# Patient Record
Sex: MALE | Race: WHITE | NOT HISPANIC OR LATINO
[De-identification: names, ages, dates, MRNs, and addresses within clinical notes are randomized per-mention and may not be internally consistent; named-entity substitution may affect disease eponyms.]

---

## 2017-01-25 ENCOUNTER — NON-APPOINTMENT (OUTPATIENT)
Age: 82
End: 2017-01-25

## 2017-01-25 ENCOUNTER — RESULT REVIEW (OUTPATIENT)
Age: 82
End: 2017-01-25

## 2017-01-25 ENCOUNTER — APPOINTMENT (OUTPATIENT)
Dept: CARDIOLOGY | Facility: CLINIC | Age: 82
End: 2017-01-25

## 2017-01-25 VITALS
WEIGHT: 226 LBS | DIASTOLIC BLOOD PRESSURE: 80 MMHG | HEIGHT: 74 IN | HEART RATE: 83 BPM | BODY MASS INDEX: 29 KG/M2 | OXYGEN SATURATION: 96 % | SYSTOLIC BLOOD PRESSURE: 120 MMHG

## 2017-01-25 DIAGNOSIS — Z86.718 PERSONAL HISTORY OF OTHER VENOUS THROMBOSIS AND EMBOLISM: ICD-10-CM

## 2017-01-25 DIAGNOSIS — Z86.010 PERSONAL HISTORY OF COLONIC POLYPS: ICD-10-CM

## 2017-01-25 LAB
INR PPP: 2 RATIO
POCT-PROTHROMBIN TIME: 24.5 SECS
QUALITY CONTROL: YES

## 2017-02-02 ENCOUNTER — APPOINTMENT (OUTPATIENT)
Dept: CARDIOLOGY | Facility: CLINIC | Age: 82
End: 2017-02-02

## 2017-02-06 ENCOUNTER — APPOINTMENT (OUTPATIENT)
Dept: CARDIOLOGY | Facility: CLINIC | Age: 82
End: 2017-02-06

## 2017-02-27 ENCOUNTER — RX RENEWAL (OUTPATIENT)
Age: 82
End: 2017-02-27

## 2017-03-06 ENCOUNTER — RX RENEWAL (OUTPATIENT)
Age: 82
End: 2017-03-06

## 2017-05-24 ENCOUNTER — NON-APPOINTMENT (OUTPATIENT)
Age: 82
End: 2017-05-24

## 2017-05-24 ENCOUNTER — APPOINTMENT (OUTPATIENT)
Dept: CARDIOLOGY | Facility: CLINIC | Age: 82
End: 2017-05-24

## 2017-05-24 VITALS
TEMPERATURE: 97.6 F | HEART RATE: 98 BPM | RESPIRATION RATE: 16 BRPM | HEIGHT: 74 IN | SYSTOLIC BLOOD PRESSURE: 128 MMHG | WEIGHT: 222 LBS | BODY MASS INDEX: 28.49 KG/M2 | OXYGEN SATURATION: 97 % | DIASTOLIC BLOOD PRESSURE: 82 MMHG

## 2017-05-24 LAB
INR PPP: 1.8 RATIO
POCT-PROTHROMBIN TIME: 21.2 SECS
QUALITY CONTROL: YES

## 2017-06-06 ENCOUNTER — RX RENEWAL (OUTPATIENT)
Age: 82
End: 2017-06-06

## 2017-06-09 ENCOUNTER — APPOINTMENT (OUTPATIENT)
Dept: CARDIOLOGY | Facility: CLINIC | Age: 82
End: 2017-06-09

## 2017-06-09 VITALS — HEART RATE: 81 BPM | DIASTOLIC BLOOD PRESSURE: 85 MMHG | OXYGEN SATURATION: 96 % | SYSTOLIC BLOOD PRESSURE: 130 MMHG

## 2017-06-09 LAB
INR PPP: 2.3 RATIO
POCT-PROTHROMBIN TIME: 27.7 SECS
QUALITY CONTROL: YES

## 2017-07-18 ENCOUNTER — APPOINTMENT (OUTPATIENT)
Dept: NEUROLOGY | Facility: CLINIC | Age: 82
End: 2017-07-18

## 2017-07-18 VITALS
HEIGHT: 74 IN | WEIGHT: 218 LBS | HEART RATE: 64 BPM | SYSTOLIC BLOOD PRESSURE: 110 MMHG | DIASTOLIC BLOOD PRESSURE: 80 MMHG | BODY MASS INDEX: 27.98 KG/M2

## 2017-07-18 DIAGNOSIS — H91.21 SUDDEN IDIOPATHIC HEARING LOSS, RIGHT EAR: ICD-10-CM

## 2017-09-27 ENCOUNTER — APPOINTMENT (OUTPATIENT)
Dept: CARDIOLOGY | Facility: CLINIC | Age: 82
End: 2017-09-27
Payer: MEDICARE

## 2017-09-27 ENCOUNTER — NON-APPOINTMENT (OUTPATIENT)
Age: 82
End: 2017-09-27

## 2017-09-27 VITALS
SYSTOLIC BLOOD PRESSURE: 100 MMHG | BODY MASS INDEX: 27.72 KG/M2 | HEIGHT: 74 IN | OXYGEN SATURATION: 96 % | DIASTOLIC BLOOD PRESSURE: 72 MMHG | WEIGHT: 216 LBS | HEART RATE: 89 BPM

## 2017-09-27 PROCEDURE — 85610 PROTHROMBIN TIME: CPT | Mod: QW

## 2017-09-27 PROCEDURE — 93000 ELECTROCARDIOGRAM COMPLETE: CPT

## 2017-09-27 PROCEDURE — 99215 OFFICE O/P EST HI 40 MIN: CPT | Mod: 25

## 2017-09-27 PROCEDURE — G0008: CPT

## 2017-09-27 PROCEDURE — 90686 IIV4 VACC NO PRSV 0.5 ML IM: CPT

## 2017-09-28 LAB
INR PPP: 2.3 RATIO
POCT-PROTHROMBIN TIME: 27 SECS
QUALITY CONTROL: YES

## 2017-09-29 ENCOUNTER — MED ADMIN CHARGE (OUTPATIENT)
Age: 82
End: 2017-09-29

## 2017-10-23 ENCOUNTER — RX RENEWAL (OUTPATIENT)
Age: 82
End: 2017-10-23

## 2017-11-24 ENCOUNTER — RX RENEWAL (OUTPATIENT)
Age: 82
End: 2017-11-24

## 2017-11-30 ENCOUNTER — RX RENEWAL (OUTPATIENT)
Age: 82
End: 2017-11-30

## 2017-12-08 ENCOUNTER — MEDICATION RENEWAL (OUTPATIENT)
Age: 82
End: 2017-12-08

## 2018-01-24 ENCOUNTER — NON-APPOINTMENT (OUTPATIENT)
Age: 83
End: 2018-01-24

## 2018-01-24 ENCOUNTER — APPOINTMENT (OUTPATIENT)
Dept: CARDIOLOGY | Facility: CLINIC | Age: 83
End: 2018-01-24
Payer: MEDICARE

## 2018-01-24 VITALS
HEIGHT: 74 IN | HEART RATE: 91 BPM | BODY MASS INDEX: 27.85 KG/M2 | OXYGEN SATURATION: 97 % | SYSTOLIC BLOOD PRESSURE: 148 MMHG | TEMPERATURE: 98 F | DIASTOLIC BLOOD PRESSURE: 88 MMHG | WEIGHT: 217 LBS

## 2018-01-24 LAB
INR PPP: 2.5 RATIO
POCT-PROTHROMBIN TIME: 30.5 SECS
QUALITY CONTROL: YES

## 2018-01-24 PROCEDURE — 93000 ELECTROCARDIOGRAM COMPLETE: CPT

## 2018-01-24 PROCEDURE — 99214 OFFICE O/P EST MOD 30 MIN: CPT | Mod: 25

## 2018-02-09 ENCOUNTER — APPOINTMENT (OUTPATIENT)
Dept: NEUROLOGY | Facility: CLINIC | Age: 83
End: 2018-02-09
Payer: MEDICARE

## 2018-02-09 VITALS
SYSTOLIC BLOOD PRESSURE: 146 MMHG | BODY MASS INDEX: 27.72 KG/M2 | WEIGHT: 216 LBS | HEART RATE: 88 BPM | DIASTOLIC BLOOD PRESSURE: 76 MMHG | HEIGHT: 74 IN

## 2018-02-09 PROCEDURE — 99214 OFFICE O/P EST MOD 30 MIN: CPT

## 2018-02-20 ENCOUNTER — APPOINTMENT (OUTPATIENT)
Dept: CARDIOLOGY | Facility: CLINIC | Age: 83
End: 2018-02-20
Payer: MEDICARE

## 2018-02-20 PROCEDURE — 93306 TTE W/DOPPLER COMPLETE: CPT

## 2018-03-14 ENCOUNTER — APPOINTMENT (OUTPATIENT)
Dept: INTERNAL MEDICINE | Facility: CLINIC | Age: 83
End: 2018-03-14

## 2018-04-26 ENCOUNTER — RX RENEWAL (OUTPATIENT)
Age: 83
End: 2018-04-26

## 2018-05-03 ENCOUNTER — APPOINTMENT (OUTPATIENT)
Dept: NEUROLOGY | Facility: CLINIC | Age: 83
End: 2018-05-03
Payer: MEDICARE

## 2018-05-03 VITALS
BODY MASS INDEX: 27.72 KG/M2 | SYSTOLIC BLOOD PRESSURE: 126 MMHG | WEIGHT: 216 LBS | DIASTOLIC BLOOD PRESSURE: 76 MMHG | HEART RATE: 78 BPM | HEIGHT: 74 IN

## 2018-05-03 PROCEDURE — 99214 OFFICE O/P EST MOD 30 MIN: CPT

## 2018-05-16 ENCOUNTER — APPOINTMENT (OUTPATIENT)
Dept: CARDIOLOGY | Facility: CLINIC | Age: 83
End: 2018-05-16
Payer: MEDICARE

## 2018-05-16 VITALS
HEART RATE: 82 BPM | SYSTOLIC BLOOD PRESSURE: 144 MMHG | HEIGHT: 74 IN | DIASTOLIC BLOOD PRESSURE: 91 MMHG | WEIGHT: 217 LBS | OXYGEN SATURATION: 98 % | BODY MASS INDEX: 27.85 KG/M2

## 2018-05-16 LAB
INR PPP: 2.8 RATIO
POCT-PROTHROMBIN TIME: 33.1 SECS
QUALITY CONTROL: YES

## 2018-05-16 PROCEDURE — 93000 ELECTROCARDIOGRAM COMPLETE: CPT

## 2018-05-16 PROCEDURE — 99214 OFFICE O/P EST MOD 30 MIN: CPT | Mod: 25

## 2018-05-16 RX ORDER — DICLOFENAC SODIUM 10 MG/G
1 GEL TOPICAL
Qty: 100 | Refills: 0 | Status: DISCONTINUED | COMMUNITY
Start: 2017-11-30 | End: 2018-05-16

## 2018-05-16 RX ORDER — CYCLOBENZAPRINE HYDROCHLORIDE 5 MG/1
5 TABLET, FILM COATED ORAL
Qty: 10 | Refills: 0 | Status: DISCONTINUED | COMMUNITY
Start: 2017-11-03 | End: 2018-05-16

## 2018-05-22 ENCOUNTER — NON-APPOINTMENT (OUTPATIENT)
Age: 83
End: 2018-05-22

## 2018-05-23 ENCOUNTER — RX RENEWAL (OUTPATIENT)
Age: 83
End: 2018-05-23

## 2018-05-23 ENCOUNTER — MEDICATION RENEWAL (OUTPATIENT)
Age: 83
End: 2018-05-23

## 2018-06-18 ENCOUNTER — RECORD ABSTRACTING (OUTPATIENT)
Age: 83
End: 2018-06-18

## 2018-06-19 ENCOUNTER — RESULT REVIEW (OUTPATIENT)
Age: 83
End: 2018-06-19

## 2018-06-19 LAB
INR PPP: 2.7
PROTHROMBIN TIME COMMENT: NORMAL

## 2018-07-13 LAB
INR PPP: 1.9
PROTHROMBIN TIME COMMENT: NORMAL

## 2018-07-20 ENCOUNTER — APPOINTMENT (OUTPATIENT)
Dept: NEUROLOGY | Facility: CLINIC | Age: 83
End: 2018-07-20
Payer: MEDICARE

## 2018-07-20 VITALS
BODY MASS INDEX: 28.23 KG/M2 | DIASTOLIC BLOOD PRESSURE: 62 MMHG | HEART RATE: 78 BPM | WEIGHT: 220 LBS | HEIGHT: 74 IN | SYSTOLIC BLOOD PRESSURE: 122 MMHG

## 2018-07-20 PROCEDURE — 99214 OFFICE O/P EST MOD 30 MIN: CPT

## 2018-07-23 ENCOUNTER — RESULT REVIEW (OUTPATIENT)
Age: 83
End: 2018-07-23

## 2018-07-23 LAB
INR PPP: 2
PROTHROMBIN TIME COMMENT: NORMAL

## 2018-08-10 ENCOUNTER — RESULT REVIEW (OUTPATIENT)
Age: 83
End: 2018-08-10

## 2018-08-10 LAB
INR PPP: 2.4
PROTHROMBIN TIME COMMENT: NORMAL

## 2018-08-24 ENCOUNTER — RX RENEWAL (OUTPATIENT)
Age: 83
End: 2018-08-24

## 2018-08-30 ENCOUNTER — RESULT REVIEW (OUTPATIENT)
Age: 83
End: 2018-08-30

## 2018-08-30 LAB — INR PPP: 3.7

## 2018-09-04 ENCOUNTER — RESULT REVIEW (OUTPATIENT)
Age: 83
End: 2018-09-04

## 2018-09-04 LAB — INR PPP: 3

## 2018-09-19 ENCOUNTER — APPOINTMENT (OUTPATIENT)
Dept: CARDIOLOGY | Facility: CLINIC | Age: 83
End: 2018-09-19
Payer: MEDICARE

## 2018-09-19 ENCOUNTER — RESULT REVIEW (OUTPATIENT)
Age: 83
End: 2018-09-19

## 2018-09-20 LAB
INR PPP: 2.5
PROTHROMBIN TIME COMMENT: NORMAL

## 2018-09-27 ENCOUNTER — NON-APPOINTMENT (OUTPATIENT)
Age: 83
End: 2018-09-27

## 2018-09-27 ENCOUNTER — APPOINTMENT (OUTPATIENT)
Dept: CARDIOLOGY | Facility: CLINIC | Age: 83
End: 2018-09-27
Payer: MEDICARE

## 2018-09-27 VITALS
DIASTOLIC BLOOD PRESSURE: 78 MMHG | OXYGEN SATURATION: 97 % | WEIGHT: 226 LBS | HEART RATE: 82 BPM | BODY MASS INDEX: 29 KG/M2 | HEIGHT: 74 IN | SYSTOLIC BLOOD PRESSURE: 118 MMHG

## 2018-09-27 PROCEDURE — 90686 IIV4 VACC NO PRSV 0.5 ML IM: CPT

## 2018-09-27 PROCEDURE — 93000 ELECTROCARDIOGRAM COMPLETE: CPT

## 2018-09-27 PROCEDURE — 99214 OFFICE O/P EST MOD 30 MIN: CPT | Mod: 25

## 2018-09-27 PROCEDURE — G0008: CPT

## 2018-10-08 ENCOUNTER — RESULT REVIEW (OUTPATIENT)
Age: 83
End: 2018-10-08

## 2018-10-08 LAB — INR PPP: 3.1

## 2018-10-11 ENCOUNTER — RX RENEWAL (OUTPATIENT)
Age: 83
End: 2018-10-11

## 2018-10-17 ENCOUNTER — RESULT REVIEW (OUTPATIENT)
Age: 83
End: 2018-10-17

## 2018-10-17 LAB
INR PPP: 2.7
PROTHROMBIN TIME COMMENT: NORMAL

## 2018-10-25 ENCOUNTER — APPOINTMENT (OUTPATIENT)
Dept: NEUROLOGY | Facility: CLINIC | Age: 83
End: 2018-10-25
Payer: MEDICARE

## 2018-10-25 PROCEDURE — 95816 EEG AWAKE AND DROWSY: CPT

## 2018-11-05 ENCOUNTER — RESULT REVIEW (OUTPATIENT)
Age: 83
End: 2018-11-05

## 2018-11-05 LAB
INR PPP: 3.5
PROTHROMBIN TIME COMMENT: NORMAL

## 2018-11-19 ENCOUNTER — APPOINTMENT (OUTPATIENT)
Dept: CARDIOLOGY | Facility: CLINIC | Age: 83
End: 2018-11-19
Payer: MEDICARE

## 2018-11-19 PROCEDURE — 36415 COLL VENOUS BLD VENIPUNCTURE: CPT

## 2018-11-21 LAB
INR PPP: 2.3 RATIO
POCT-PROTHROMBIN TIME: 27.6 SECS
QUALITY CONTROL: YES

## 2018-12-12 ENCOUNTER — RESULT REVIEW (OUTPATIENT)
Age: 83
End: 2018-12-12

## 2018-12-12 ENCOUNTER — APPOINTMENT (OUTPATIENT)
Dept: CARDIOLOGY | Facility: CLINIC | Age: 83
End: 2018-12-12
Payer: MEDICARE

## 2018-12-12 LAB
INR PPP: 3 RATIO
POCT-PROTHROMBIN TIME: 35.5 SECS
QUALITY CONTROL: YES

## 2018-12-12 PROCEDURE — 85610 PROTHROMBIN TIME: CPT | Mod: QW

## 2018-12-12 PROCEDURE — G0250: CPT

## 2018-12-27 ENCOUNTER — TRANSCRIPTION ENCOUNTER (OUTPATIENT)
Age: 83
End: 2018-12-27

## 2018-12-28 ENCOUNTER — RESULT REVIEW (OUTPATIENT)
Age: 83
End: 2018-12-28

## 2018-12-28 LAB
INR PPP: 2.3
PROTHROMBIN TIME COMMENT: NORMAL
PT BLD: 22.9

## 2019-01-18 ENCOUNTER — RX RENEWAL (OUTPATIENT)
Age: 84
End: 2019-01-18

## 2019-01-18 ENCOUNTER — APPOINTMENT (OUTPATIENT)
Dept: CARDIOLOGY | Facility: CLINIC | Age: 84
End: 2019-01-18
Payer: MEDICARE

## 2019-01-18 ENCOUNTER — RESULT REVIEW (OUTPATIENT)
Age: 84
End: 2019-01-18

## 2019-01-18 ENCOUNTER — APPOINTMENT (OUTPATIENT)
Dept: NEUROLOGY | Facility: CLINIC | Age: 84
End: 2019-01-18
Payer: MEDICARE

## 2019-01-18 ENCOUNTER — NON-APPOINTMENT (OUTPATIENT)
Age: 84
End: 2019-01-18

## 2019-01-18 VITALS
HEIGHT: 74 IN | HEART RATE: 98 BPM | SYSTOLIC BLOOD PRESSURE: 120 MMHG | OXYGEN SATURATION: 98 % | BODY MASS INDEX: 29.52 KG/M2 | DIASTOLIC BLOOD PRESSURE: 79 MMHG | WEIGHT: 230 LBS

## 2019-01-18 VITALS
BODY MASS INDEX: 16.68 KG/M2 | WEIGHT: 130 LBS | SYSTOLIC BLOOD PRESSURE: 126 MMHG | HEIGHT: 74 IN | HEART RATE: 86 BPM | DIASTOLIC BLOOD PRESSURE: 64 MMHG

## 2019-01-18 DIAGNOSIS — R40.4 TRANSIENT ALTERATION OF AWARENESS: ICD-10-CM

## 2019-01-18 LAB
INR PPP: 2.5
PROTHROMBIN TIME COMMENT: NORMAL

## 2019-01-18 PROCEDURE — 99214 OFFICE O/P EST MOD 30 MIN: CPT

## 2019-01-18 PROCEDURE — 93000 ELECTROCARDIOGRAM COMPLETE: CPT

## 2019-01-18 PROCEDURE — 99214 OFFICE O/P EST MOD 30 MIN: CPT | Mod: 25

## 2019-01-18 RX ORDER — DOXYCYCLINE HYCLATE 100 MG/1
100 CAPSULE ORAL
Qty: 20 | Refills: 0 | Status: COMPLETED | COMMUNITY
Start: 2019-01-02

## 2019-01-18 RX ORDER — BENZONATATE 100 MG/1
100 CAPSULE ORAL
Qty: 20 | Refills: 0 | Status: COMPLETED | COMMUNITY
Start: 2018-12-26

## 2019-01-18 RX ORDER — FLUTICASONE PROPIONATE 50 UG/1
50 SPRAY, METERED NASAL
Qty: 16 | Refills: 0 | Status: COMPLETED | COMMUNITY
Start: 2018-12-26

## 2019-01-18 RX ORDER — ALBUTEROL SULFATE 90 UG/1
108 (90 BASE) AEROSOL, METERED RESPIRATORY (INHALATION)
Qty: 8 | Refills: 0 | Status: COMPLETED | COMMUNITY
Start: 2018-12-26

## 2019-01-18 RX ORDER — MECLIZINE HYDROCHLORIDE 25 MG/1
25 TABLET ORAL
Qty: 30 | Refills: 0 | Status: COMPLETED | COMMUNITY
Start: 2018-10-19

## 2019-01-18 NOTE — HISTORY OF PRESENT ILLNESS
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 When he received physical therapy his gait improved significantly.\par \par No significant new changes since last visit still taking Coumadin for his DVT. No headaches no dizziness no focal weakness. History of chronic a fibrillation and DVT. No focal complaints at present except for persistent gait difficulty. No low back pain.

## 2019-01-18 NOTE — PHYSICAL EXAM
[General Appearance - Alert] : alert [General Appearance - In No Acute Distress] : in no acute distress [Oriented To Time, Place, And Person] : oriented to person, place, and time [Impaired Insight] : insight and judgment were intact [Affect] : the affect was normal [Person] : oriented to person [Place] : oriented to place [Time] : oriented to time [Concentration Intact] : normal concentrating ability [Visual Intact] : visual attention was ~T not ~L decreased [Naming Objects] : no difficulty naming common objects [Repeating Phrases] : no difficulty repeating a phrase [Writing A Sentence] : no difficulty writing a sentence [Fluency] : fluency intact [Comprehension] : comprehension intact [Reading] : reading intact [Past History] : adequate knowledge of personal past history [Cranial Nerves Optic (II)] : visual acuity intact bilaterally,  visual fields full to confrontation, pupils equal round and reactive to light [Cranial Nerves Oculomotor (III)] : extraocular motion intact [Cranial Nerves Trigeminal (V)] : facial sensation intact symmetrically [Cranial Nerves Facial (VII)] : face symmetrical [Cranial Nerves Vestibulocochlear (VIII)] : hearing was intact bilaterally [Cranial Nerves Glossopharyngeal (IX)] : tongue and palate midline [Cranial Nerves Accessory (XI - Cranial And Spinal)] : head turning and shoulder shrug symmetric [Cranial Nerves Hypoglossal (XII)] : there was no tongue deviation with protrusion [Motor Strength] : muscle strength was normal in all four extremities [No Muscle Atrophy] : normal bulk in all four extremities [Sensation Tactile Decrease] : light touch was intact [Romberg's Sign] : a positive Romberg's sign was present [Limited Balance] : the patient's balance was impaired [1+] : Patella left 1+ [0] : Ankle jerk left 0 [Sclera] : the sclera and conjunctiva were normal [PERRL With Normal Accommodation] : pupils were equal in size, round, reactive to light, with normal accommodation [Extraocular Movements] : extraocular movements were intact [Outer Ear] : the ears and nose were normal in appearance [Oropharynx] : the oropharynx was normal [Neck Appearance] : the appearance of the neck was normal [Neck Cervical Mass (___cm)] : no neck mass was observed [Jugular Venous Distention Increased] : there was no jugular-venous distention [Thyroid Diffuse Enlargement] : the thyroid was not enlarged [Thyroid Nodule] : there were no palpable thyroid nodules [Auscultation Breath Sounds / Voice Sounds] : lungs were clear to auscultation bilaterally [Heart Sounds] : normal S1 and S2 [Heart Sounds Gallop] : no gallops [Murmurs] : no murmurs [Heart Sounds Pericardial Friction Rub] : no pericardial rub [Full Pulse] : the pedal pulses are present [Bowel Sounds] : normal bowel sounds [Abdomen Soft] : soft [Abdomen Tenderness] : non-tender [Abdomen Mass (___ Cm)] : no abdominal mass palpated [No CVA Tenderness] : no ~M costovertebral angle tenderness [No Spinal Tenderness] : no spinal tenderness [Nail Clubbing] : no clubbing  or cyanosis of the fingernails [Musculoskeletal - Swelling] : no joint swelling seen [Motor Tone] : muscle strength and tone were normal [Skin Color & Pigmentation] : normal skin color and pigmentation [Skin Turgor] : normal skin turgor [] : no rash [Past-pointing] : there was no past-pointing [Tremor] : no tremor present [Plantar Reflex Right Only] : normal on the right [Plantar Reflex Left Only] : normal on the left [___] : absent on the right [___] : absent on the left [FreeTextEntry7] : Distal vibration loss [FreeTextEntry8] : Unsteady improved [FreeTextEntry1] : Unsteady and unable to tandem walk

## 2019-01-18 NOTE — DISCUSSION/SUMMARY
[FreeTextEntry1] : Shin with unsteady gait with underlying neuropathy by history and  NPH unchanged an MRI scan.\par \par EEG did not reveal any seizure like events.\par \par Continue followup with you for other medical problems and cardiology.\par \par Does not want a neurosurgical consult at this time.\par \par Recommend patient to have a repeat EMG/NCV studies. Prior study done with a  Dr. Espinosa and no reports available.\par \par Continue exercises as tolerated does not want to start physical therapy at this time.\par \par Return for reevaluation after the EMG is done.\par \par  Discussed with the patient and his wife.

## 2019-01-18 NOTE — REASON FOR VISIT
[Follow-Up - Clinic] : a clinic follow-up of [Anticoagulation] : anticoagulation [Atrial Fibrillation] : atrial fibrillation [Hyperlipidemia] : hyperlipidemia [Hypertension] : hypertension [FreeTextEntry1] : CM

## 2019-01-18 NOTE — REVIEW OF SYSTEMS
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

## 2019-01-18 NOTE — DISCUSSION/SUMMARY
[INR Therapeutic] : the patient's INR is therapeutic [Atrial Fibrillation] : atrial fibrillation [Compensated] : compensated [Cardiomyopathy] : cardiomyopathy [Hypertension] : hypertension [Responding to Treatment] : responding to treatment [None] : none [Patient] : the patient [FreeTextEntry1] : H/O AF/CM/HTN/Hyperlipidemia/DVT/treated ADRY who presents here today in routine follow up\par \par From cardiac standpoint patient is doing well\par \par OV 3 months

## 2019-01-18 NOTE — HISTORY OF PRESENT ILLNESS
[FreeTextEntry1] : H/O AF/CM/HTN/Hyperlipidemia/DVT/treated ADRY who presents here today in routine follow up\par \par recently completed course of ABX 2/2 bronchitis \par \par Is under care of Neurology/ENT/Vascular  gait instability/neuropathy  \par \par Echo/Carotid US 2018 \par \par Presently feeling well

## 2019-02-12 ENCOUNTER — APPOINTMENT (OUTPATIENT)
Dept: NEUROLOGY | Facility: CLINIC | Age: 84
End: 2019-02-12

## 2019-02-14 ENCOUNTER — RESULT REVIEW (OUTPATIENT)
Age: 84
End: 2019-02-14

## 2019-02-14 LAB — INR PPP: 2.1

## 2019-04-01 LAB
INR PPP: 2.3
PROTHROMBIN TIME COMMENT: NORMAL
PT BLD: 22.6

## 2019-04-23 LAB
INR PPP: 2.3
PT BLD: NORMAL

## 2019-05-13 ENCOUNTER — RX RENEWAL (OUTPATIENT)
Age: 84
End: 2019-05-13

## 2019-05-16 ENCOUNTER — APPOINTMENT (OUTPATIENT)
Dept: CARDIOLOGY | Facility: CLINIC | Age: 84
End: 2019-05-16
Payer: MEDICARE

## 2019-05-16 ENCOUNTER — NON-APPOINTMENT (OUTPATIENT)
Age: 84
End: 2019-05-16

## 2019-05-16 VITALS — SYSTOLIC BLOOD PRESSURE: 110 MMHG | HEART RATE: 95 BPM | OXYGEN SATURATION: 96 % | DIASTOLIC BLOOD PRESSURE: 80 MMHG

## 2019-05-16 LAB
INR PPP: 2.5 RATIO
POCT-PROTHROMBIN TIME: 29.9 SECS
QUALITY CONTROL: YES

## 2019-05-16 PROCEDURE — 85610 PROTHROMBIN TIME: CPT | Mod: QW

## 2019-05-16 PROCEDURE — 93000 ELECTROCARDIOGRAM COMPLETE: CPT

## 2019-05-16 PROCEDURE — 99214 OFFICE O/P EST MOD 30 MIN: CPT | Mod: 25

## 2019-05-16 NOTE — DISCUSSION/SUMMARY
[INR Therapeutic] : the patient's INR is therapeutic [Atrial Fibrillation] : atrial fibrillation [Compensated] : compensated [Cardiomyopathy] : cardiomyopathy [Hypertension] : hypertension [Responding to Treatment] : responding to treatment [None] : none [Patient] : the patient [FreeTextEntry1] : CAD: CC 2016 reviewed No active cardiac complaints.  Routine stress test ordered \par \par AF: EKG Moderate ventricular response HR 99.  INR 2.5 \par \par CM: Yearly Echocardiogram ( Last 2018 EF 47% )  Echo ordered today \par \par AS: Moderate by Echo \par \par HTN: Controlled \par \par Hyperlipidemia: Continue statin therapy.  Labs ordered today with LDL goal of 70 \par \par DVT:  followed with Vascular Dr Nunez \par \par treated ADRY\par \par From cardiac standpoint patient is doing well\par \par OV 3 months

## 2019-05-16 NOTE — HISTORY OF PRESENT ILLNESS
[FreeTextEntry1] : 83 Y/O Gentleman PMH: AF on oral AC/CM/HTN/Hyperlipidemia/DVT/treated ADRY who presents here today in routine cardiac follow up.  Is followed with PCP\par \par Claims to be feeling well no active cardiac complaints\par \par Echo/Carotid US 2018 \par \par

## 2019-05-17 ENCOUNTER — APPOINTMENT (OUTPATIENT)
Dept: NEUROLOGY | Facility: CLINIC | Age: 84
End: 2019-05-17
Payer: MEDICARE

## 2019-05-17 VITALS
SYSTOLIC BLOOD PRESSURE: 126 MMHG | WEIGHT: 230 LBS | DIASTOLIC BLOOD PRESSURE: 62 MMHG | BODY MASS INDEX: 29.52 KG/M2 | HEART RATE: 88 BPM | HEIGHT: 74 IN

## 2019-05-17 PROCEDURE — 99214 OFFICE O/P EST MOD 30 MIN: CPT

## 2019-05-17 NOTE — CONSULT LETTER
[Dear  ___] : Dear  [unfilled], [Consult Closing:] : Thank you very much for allowing me to participate in the care of this patient.  If you have any questions, please do not hesitate to contact me. [Please see my note below.] : Please see my note below. [Consult Letter:] : I had the pleasure of evaluating your patient, [unfilled]. [Sincerely,] : Sincerely,

## 2019-05-17 NOTE — REASON FOR VISIT
[Spouse] : spouse [Follow-Up: _____] : a [unfilled] follow-up visit [FreeTextEntry1] : Follow up fro pt with neuropathy and unsteady gait with NPH

## 2019-05-17 NOTE — DISCUSSION/SUMMARY
[FreeTextEntry1] : Patient with a history of unsteady gait with neuropathy history of NPH an MRI scan.\par \par Symptoms improved as per patient's wife with [employer] remote with ENT.\par \par Continue followup evaluation as needed.\par \par Continue exercises as tolerated.\par \par Patient education provided discuss with the patient and his wife at length.\par \par Return for reevaluation in 6 months or as needed.

## 2019-05-17 NOTE — PHYSICAL EXAM
[General Appearance - In No Acute Distress] : in no acute distress [General Appearance - Alert] : alert [Oriented To Time, Place, And Person] : oriented to person, place, and time [Impaired Insight] : insight and judgment were intact [Affect] : the affect was normal [Person] : oriented to person [Place] : oriented to place [Time] : oriented to time [Concentration Intact] : normal concentrating ability [Visual Intact] : visual attention was ~T not ~L decreased [Naming Objects] : no difficulty naming common objects [Writing A Sentence] : no difficulty writing a sentence [Repeating Phrases] : no difficulty repeating a phrase [Fluency] : fluency intact [Comprehension] : comprehension intact [Reading] : reading intact [Cranial Nerves Optic (II)] : visual acuity intact bilaterally,  visual fields full to confrontation, pupils equal round and reactive to light [Cranial Nerves Oculomotor (III)] : extraocular motion intact [Past History] : adequate knowledge of personal past history [Cranial Nerves Trigeminal (V)] : facial sensation intact symmetrically [Cranial Nerves Facial (VII)] : face symmetrical [Cranial Nerves Vestibulocochlear (VIII)] : hearing was intact bilaterally [Cranial Nerves Accessory (XI - Cranial And Spinal)] : head turning and shoulder shrug symmetric [Cranial Nerves Glossopharyngeal (IX)] : tongue and palate midline [Cranial Nerves Hypoglossal (XII)] : there was no tongue deviation with protrusion [Motor Strength] : muscle strength was normal in all four extremities [No Muscle Atrophy] : normal bulk in all four extremities [Sensation Tactile Decrease] : light touch was intact [Limited Balance] : the patient's balance was impaired [Past-pointing] : there was no past-pointing [Romberg's Sign] : a positive Romberg's sign was present [Tremor] : no tremor present [1+] : Patella left 1+ [Plantar Reflex Right Only] : normal on the right [0] : Ankle jerk right 0 [___] : absent on the right [___] : absent on the left [Plantar Reflex Left Only] : normal on the left [FreeTextEntry7] : Distal vibration loss [FreeTextEntry8] : Unsteady improved [Sclera] : the sclera and conjunctiva were normal [Outer Ear] : the ears and nose were normal in appearance [PERRL With Normal Accommodation] : pupils were equal in size, round, reactive to light, with normal accommodation [Extraocular Movements] : extraocular movements were intact [Neck Cervical Mass (___cm)] : no neck mass was observed [Oropharynx] : the oropharynx was normal [Neck Appearance] : the appearance of the neck was normal [Thyroid Nodule] : there were no palpable thyroid nodules [Jugular Venous Distention Increased] : there was no jugular-venous distention [Thyroid Diffuse Enlargement] : the thyroid was not enlarged [Heart Sounds] : normal S1 and S2 [Auscultation Breath Sounds / Voice Sounds] : lungs were clear to auscultation bilaterally [Heart Sounds Pericardial Friction Rub] : no pericardial rub [Heart Sounds Gallop] : no gallops [Murmurs] : no murmurs [Full Pulse] : the pedal pulses are present [Bowel Sounds] : normal bowel sounds [Abdomen Tenderness] : non-tender [Abdomen Soft] : soft [Abdomen Mass (___ Cm)] : no abdominal mass palpated [No CVA Tenderness] : no ~M costovertebral angle tenderness [No Spinal Tenderness] : no spinal tenderness [Musculoskeletal - Swelling] : no joint swelling seen [Nail Clubbing] : no clubbing  or cyanosis of the fingernails [Motor Tone] : muscle strength and tone were normal [Skin Color & Pigmentation] : normal skin color and pigmentation [Skin Turgor] : normal skin turgor [FreeTextEntry1] : Unsteady and unable to tandem walk [] : no rash

## 2019-05-17 NOTE — HISTORY OF PRESENT ILLNESS
[FreeTextEntry1] : He is 84-year-old patient coming here for a followup evaluation with unsteady gait with underlying neuropathy and episodes of dizziness and incidental finding of NPH.\par \par Denies of any new complaints marked improvement in his symptoms since wax removed from his ears and denies of any new complaints. He did not come for EMG studies because symptoms improved significantly since last visit offers no new complaints. Going for physical therapy and exercises and completed currently. No significant new complaints.\par \par

## 2019-05-17 NOTE — REVIEW OF SYSTEMS
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

## 2019-06-03 ENCOUNTER — RX RENEWAL (OUTPATIENT)
Age: 84
End: 2019-06-03

## 2019-06-03 ENCOUNTER — MEDICATION RENEWAL (OUTPATIENT)
Age: 84
End: 2019-06-03

## 2019-06-05 ENCOUNTER — RX RENEWAL (OUTPATIENT)
Age: 84
End: 2019-06-05

## 2019-06-17 ENCOUNTER — RX RENEWAL (OUTPATIENT)
Age: 84
End: 2019-06-17

## 2019-06-17 ENCOUNTER — RESULT REVIEW (OUTPATIENT)
Age: 84
End: 2019-06-17

## 2019-06-21 LAB
INR PPP: 2.2
PROTHROMBIN TIME COMMENT: NORMAL
PT BLD: 22.2

## 2019-07-05 ENCOUNTER — RX RENEWAL (OUTPATIENT)
Age: 84
End: 2019-07-05

## 2019-07-24 NOTE — PHYSICAL EXAM
PAGED DR STARR [General Appearance - Well Developed] : well developed [Normal Appearance] : normal appearance [Well Groomed] : well groomed [No Deformities] : no deformities [General Appearance - Well Nourished] : well nourished [General Appearance - In No Acute Distress] : no acute distress [Normal Conjunctiva] : the conjunctiva exhibited no abnormalities [] : no respiratory distress [Exaggerated Use Of Accessory Muscles For Inspiration] : no accessory muscle use [Respiration, Rhythm And Depth] : normal respiratory rhythm and effort [Auscultation Breath Sounds / Voice Sounds] : lungs were clear to auscultation bilaterally [Heart Rate And Rhythm] : heart rate and rhythm were normal [Heart Sounds] : normal S1 and S2 [Abdomen Soft] : soft [Abnormal Walk] : normal gait [Oriented To Time, Place, And Person] : oriented to person, place, and time [Skin Color & Pigmentation] : normal skin color and pigmentation [FreeTextEntry1] : No LE Edema

## 2019-07-30 LAB
INR PPP: 2.8
PROTHROMBIN TIME COMMENT: NORMAL
PT BLD: 28

## 2019-08-07 ENCOUNTER — MEDICATION RENEWAL (OUTPATIENT)
Age: 84
End: 2019-08-07

## 2019-08-07 ENCOUNTER — RX RENEWAL (OUTPATIENT)
Age: 84
End: 2019-08-07

## 2019-08-20 ENCOUNTER — RESULT REVIEW (OUTPATIENT)
Age: 84
End: 2019-08-20

## 2019-08-21 ENCOUNTER — RESULT REVIEW (OUTPATIENT)
Age: 84
End: 2019-08-21

## 2019-08-21 LAB
INR PPP: 2.4
PROTHROMBIN TIME COMMENT: NORMAL
PT BLD: 23.3

## 2019-09-25 ENCOUNTER — RESULT REVIEW (OUTPATIENT)
Age: 84
End: 2019-09-25

## 2019-09-25 LAB
INR PPP: 2.5
PROTHROMBIN TIME COMMENT: NORMAL
PT BLD: 24.3

## 2019-09-26 ENCOUNTER — NON-APPOINTMENT (OUTPATIENT)
Age: 84
End: 2019-09-26

## 2019-09-26 ENCOUNTER — APPOINTMENT (OUTPATIENT)
Dept: CARDIOLOGY | Facility: CLINIC | Age: 84
End: 2019-09-26
Payer: MEDICARE

## 2019-09-26 VITALS
DIASTOLIC BLOOD PRESSURE: 88 MMHG | OXYGEN SATURATION: 97 % | WEIGHT: 224 LBS | HEART RATE: 86 BPM | HEIGHT: 74 IN | BODY MASS INDEX: 28.75 KG/M2 | SYSTOLIC BLOOD PRESSURE: 128 MMHG

## 2019-09-26 PROCEDURE — 93000 ELECTROCARDIOGRAM COMPLETE: CPT

## 2019-09-26 PROCEDURE — 99214 OFFICE O/P EST MOD 30 MIN: CPT | Mod: 25

## 2019-09-27 NOTE — PHYSICAL EXAM
[Normal Appearance] : normal appearance [General Appearance - Well Developed] : well developed [General Appearance - Well Nourished] : well nourished [Well Groomed] : well groomed [No Deformities] : no deformities [Normal Conjunctiva] : the conjunctiva exhibited no abnormalities [General Appearance - In No Acute Distress] : no acute distress [] : no respiratory distress [Exaggerated Use Of Accessory Muscles For Inspiration] : no accessory muscle use [Respiration, Rhythm And Depth] : normal respiratory rhythm and effort [Heart Rate And Rhythm] : heart rate and rhythm were normal [Auscultation Breath Sounds / Voice Sounds] : lungs were clear to auscultation bilaterally [Heart Sounds] : normal S1 and S2 [Abnormal Walk] : normal gait [Abdomen Soft] : soft [Skin Color & Pigmentation] : normal skin color and pigmentation [FreeTextEntry1] : No LE Edema  [Oriented To Time, Place, And Person] : oriented to person, place, and time

## 2019-09-27 NOTE — REASON FOR VISIT
[Follow-Up - Clinic] : a clinic follow-up of [Atrial Fibrillation] : atrial fibrillation [Anticoagulation] : anticoagulation [Hypertension] : hypertension [Hyperlipidemia] : hyperlipidemia [FreeTextEntry1] : CM

## 2019-09-27 NOTE — DISCUSSION/SUMMARY
[INR Therapeutic] : the patient's INR is therapeutic [Atrial Fibrillation] : atrial fibrillation [Compensated] : compensated [Cardiomyopathy] : cardiomyopathy [Hypertension] : hypertension [Responding to Treatment] : responding to treatment [None] : none [Patient] : the patient [FreeTextEntry1] : CAD: CC 2016 reviewed No active cardiac complaints.  Routine stress test ordered \par \par AF: EKG Moderate ventricular response HR 99.  INR 2.5 \par \par CM: Echo reviewed\par \par AS: Moderate by Echo . Repeat echo ordered\par \par HTN: Controlled \par \par Hyperlipidemia: Continue statin therapy.  Labs ordered today with LDL goal of 70 \par \par DVT:  followed with Vascular Dr Nunez \par \par treated ADRY\par \par Pt is compensated\par \par OV 3 months

## 2019-09-27 NOTE — HISTORY OF PRESENT ILLNESS
[FreeTextEntry1] : 86 Y/O Gentleman PMH: AF on oral AC/CM/HTN/Hyperlipidemia/DVT/treated ADRY who presents here today in routine cardiac follow up.  Is followed with PCP\par \par Claims to be feeling well no active cardiac complaints. He has just returned from a cruise,. He experienced no exertional symptoms\par \par Echo/Carotid US 2018 \par \par

## 2019-10-17 ENCOUNTER — APPOINTMENT (OUTPATIENT)
Dept: CARDIOLOGY | Facility: CLINIC | Age: 84
End: 2019-10-17
Payer: MEDICARE

## 2019-10-17 PROCEDURE — 93306 TTE W/DOPPLER COMPLETE: CPT

## 2019-10-24 ENCOUNTER — RESULT REVIEW (OUTPATIENT)
Age: 84
End: 2019-10-24

## 2019-10-25 LAB
INR PPP: 2.5
PROTHROMBIN TIME COMMENT: NORMAL
PT BLD: 23.9

## 2019-11-27 ENCOUNTER — RESULT REVIEW (OUTPATIENT)
Age: 84
End: 2019-11-27

## 2019-12-02 ENCOUNTER — RESULT REVIEW (OUTPATIENT)
Age: 84
End: 2019-12-02

## 2019-12-02 LAB
INR PPP: 2
PROTHROMBIN TIME COMMENT: NORMAL
PT BLD: 19.9

## 2020-01-06 ENCOUNTER — APPOINTMENT (OUTPATIENT)
Dept: NEUROLOGY | Facility: CLINIC | Age: 85
End: 2020-01-06
Payer: MEDICARE

## 2020-01-06 VITALS
HEIGHT: 73 IN | HEART RATE: 78 BPM | BODY MASS INDEX: 29.29 KG/M2 | WEIGHT: 221 LBS | RESPIRATION RATE: 16 BRPM | SYSTOLIC BLOOD PRESSURE: 120 MMHG | DIASTOLIC BLOOD PRESSURE: 68 MMHG

## 2020-01-06 DIAGNOSIS — R41.0 DISORIENTATION, UNSPECIFIED: ICD-10-CM

## 2020-01-06 PROCEDURE — 99214 OFFICE O/P EST MOD 30 MIN: CPT

## 2020-01-06 NOTE — REASON FOR VISIT
[Follow-Up: _____] : a [unfilled] follow-up visit [Spouse] : spouse [FreeTextEntry1] : Follow up fro pt with gait instability and neuropathy

## 2020-01-06 NOTE — HISTORY OF PRESENT ILLNESS
[FreeTextEntry1] : He is 85-year-old patient coming here for followup evaluation for gait imbalance with underlying neuropathy and incidental NPH without any acute pathology coming here for followup evaluation stable neurologically.\par \par No new complaints per his wife coming here without any new complaints. Persistent gait imbalance without any new complaints. No changes in his medications. Unsteadiness unchanged since last visit.

## 2020-01-06 NOTE — REVIEW OF SYSTEMS
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

## 2020-01-06 NOTE — DISCUSSION/SUMMARY
[FreeTextEntry1] : Patient with unsteady gait with peripheral neuropathy and incidental NPH.\par \par Unchanged symptoms as per his wife.\par \par Continue present medications.\par \par Followup with ENT and you and cardiology.\par \par Continue exercises as tolerated\par \par No other workup is needed at this time.\par \par Return for reevaluation in 6 months to one year.\par \par Patient education provided and discussed with patient and his wife.\par \par

## 2020-01-06 NOTE — PHYSICAL EXAM
[General Appearance - In No Acute Distress] : in no acute distress [General Appearance - Alert] : alert [Impaired Insight] : insight and judgment were intact [Affect] : the affect was normal [Oriented To Time, Place, And Person] : oriented to person, place, and time [Person] : oriented to person [Time] : oriented to time [Place] : oriented to place [Concentration Intact] : normal concentrating ability [Repeating Phrases] : no difficulty repeating a phrase [Visual Intact] : visual attention was ~T not ~L decreased [Naming Objects] : no difficulty naming common objects [Writing A Sentence] : no difficulty writing a sentence [Fluency] : fluency intact [Comprehension] : comprehension intact [Past History] : adequate knowledge of personal past history [Reading] : reading intact [Cranial Nerves Facial (VII)] : face symmetrical [Cranial Nerves Oculomotor (III)] : extraocular motion intact [Cranial Nerves Trigeminal (V)] : facial sensation intact symmetrically [Cranial Nerves Optic (II)] : visual acuity intact bilaterally,  visual fields full to confrontation, pupils equal round and reactive to light [Cranial Nerves Vestibulocochlear (VIII)] : hearing was intact bilaterally [Cranial Nerves Glossopharyngeal (IX)] : tongue and palate midline [Cranial Nerves Accessory (XI - Cranial And Spinal)] : head turning and shoulder shrug symmetric [Motor Strength] : muscle strength was normal in all four extremities [Cranial Nerves Hypoglossal (XII)] : there was no tongue deviation with protrusion [Sensation Tactile Decrease] : light touch was intact [Romberg's Sign] : a positive Romberg's sign was present [No Muscle Atrophy] : normal bulk in all four extremities [Tremor] : no tremor present [Limited Balance] : the patient's balance was impaired [Past-pointing] : there was no past-pointing [1+] : Brachioradialis left 1+ [Plantar Reflex Right Only] : normal on the right [0] : Ankle jerk right 0 [Plantar Reflex Left Only] : normal on the left [___] : absent on the right [FreeTextEntry8] : Unsteady improved [___] : absent on the left [FreeTextEntry7] : Distal vibration loss [PERRL With Normal Accommodation] : pupils were equal in size, round, reactive to light, with normal accommodation [Extraocular Movements] : extraocular movements were intact [Sclera] : the sclera and conjunctiva were normal [Outer Ear] : the ears and nose were normal in appearance [Oropharynx] : the oropharynx was normal [Neck Appearance] : the appearance of the neck was normal [Jugular Venous Distention Increased] : there was no jugular-venous distention [Neck Cervical Mass (___cm)] : no neck mass was observed [Thyroid Diffuse Enlargement] : the thyroid was not enlarged [Thyroid Nodule] : there were no palpable thyroid nodules [Heart Sounds] : normal S1 and S2 [Auscultation Breath Sounds / Voice Sounds] : lungs were clear to auscultation bilaterally [Murmurs] : no murmurs [Heart Sounds Pericardial Friction Rub] : no pericardial rub [Heart Sounds Gallop] : no gallops [Full Pulse] : the pedal pulses are present [Bowel Sounds] : normal bowel sounds [Abdomen Soft] : soft [Abdomen Mass (___ Cm)] : no abdominal mass palpated [Abdomen Tenderness] : non-tender [No CVA Tenderness] : no ~M costovertebral angle tenderness [No Spinal Tenderness] : no spinal tenderness [Nail Clubbing] : no clubbing  or cyanosis of the fingernails [Musculoskeletal - Swelling] : no joint swelling seen [Motor Tone] : muscle strength and tone were normal [FreeTextEntry1] : Unsteady and unable to tandem walk [Skin Color & Pigmentation] : normal skin color and pigmentation [Skin Turgor] : normal skin turgor [] : no rash

## 2020-01-09 ENCOUNTER — NON-APPOINTMENT (OUTPATIENT)
Age: 85
End: 2020-01-09

## 2020-01-09 ENCOUNTER — APPOINTMENT (OUTPATIENT)
Dept: CARDIOLOGY | Facility: CLINIC | Age: 85
End: 2020-01-09
Payer: MEDICARE

## 2020-01-09 VITALS
OXYGEN SATURATION: 97 % | BODY MASS INDEX: 29.29 KG/M2 | HEIGHT: 73 IN | DIASTOLIC BLOOD PRESSURE: 91 MMHG | HEART RATE: 72 BPM | SYSTOLIC BLOOD PRESSURE: 147 MMHG | WEIGHT: 221 LBS

## 2020-01-09 PROCEDURE — 99214 OFFICE O/P EST MOD 30 MIN: CPT

## 2020-01-09 PROCEDURE — 93000 ELECTROCARDIOGRAM COMPLETE: CPT

## 2020-01-09 NOTE — PHYSICAL EXAM
[Normal Appearance] : normal appearance [General Appearance - Well Developed] : well developed [Well Groomed] : well groomed [No Deformities] : no deformities [General Appearance - Well Nourished] : well nourished [Normal Conjunctiva] : the conjunctiva exhibited no abnormalities [General Appearance - In No Acute Distress] : no acute distress [Respiration, Rhythm And Depth] : normal respiratory rhythm and effort [Exaggerated Use Of Accessory Muscles For Inspiration] : no accessory muscle use [] : no respiratory distress [Heart Sounds] : normal S1 and S2 [Auscultation Breath Sounds / Voice Sounds] : lungs were clear to auscultation bilaterally [Heart Rate And Rhythm] : heart rate and rhythm were normal [Abdomen Soft] : soft [Skin Color & Pigmentation] : normal skin color and pigmentation [Abnormal Walk] : normal gait [Oriented To Time, Place, And Person] : oriented to person, place, and time [FreeTextEntry1] : No LE Edema

## 2020-01-09 NOTE — DISCUSSION/SUMMARY
[INR Therapeutic] : the patient's INR is therapeutic [Compensated] : compensated [Atrial Fibrillation] : atrial fibrillation [Cardiomyopathy] : cardiomyopathy [Hypertension] : hypertension [Responding to Treatment] : responding to treatment [Patient] : the patient [None] : none [FreeTextEntry1] : CAD: CC 2016 reviewed No active cardiac complaints.  Routine stress test ordered \par \par AF: EKG Moderate ventricular response HR 99.  INR 2.5 \par \par CM: Echo reviewed\par \par AS: Moderate by Echo . Discussed Echo findings\par \par HTN: Controlled \par \par Hyperlipidemia: Continue statin therapy.  Labs ordered today with LDL goal of 70 \par \par \par \par Pt will increase exercise after ETT\par \par OV 3 months

## 2020-01-09 NOTE — HISTORY OF PRESENT ILLNESS
[FreeTextEntry1] : 86 Y/O Gentleman PMH: AF on oral AC/CM/HTN/Hyperlipidemia/DVT/treated ADRY who presents here today in routine cardiac follow up.  Is followed with PCP\par \par Pt's wife reports mild dyspnea with exertion. Pt denies chest pressure. Cpap mask being used. \par

## 2020-01-20 LAB
INR PPP: 2.2
PROTHROMBIN TIME COMMENT: NORMAL

## 2020-01-30 ENCOUNTER — APPOINTMENT (OUTPATIENT)
Dept: CARDIOLOGY | Facility: CLINIC | Age: 85
End: 2020-01-30
Payer: MEDICARE

## 2020-01-30 PROCEDURE — 78452 HT MUSCLE IMAGE SPECT MULT: CPT

## 2020-01-30 PROCEDURE — 93015 CV STRESS TEST SUPVJ I&R: CPT

## 2020-01-30 PROCEDURE — A9500: CPT

## 2020-02-12 ENCOUNTER — FORM ENCOUNTER (OUTPATIENT)
Age: 85
End: 2020-02-12

## 2020-02-13 ENCOUNTER — APPOINTMENT (OUTPATIENT)
Dept: RADIOLOGY | Facility: CLINIC | Age: 85
End: 2020-02-13
Payer: MEDICARE

## 2020-02-13 ENCOUNTER — OUTPATIENT (OUTPATIENT)
Dept: OUTPATIENT SERVICES | Facility: HOSPITAL | Age: 85
LOS: 1 days | End: 2020-02-13
Payer: MEDICARE

## 2020-02-13 ENCOUNTER — NON-APPOINTMENT (OUTPATIENT)
Age: 85
End: 2020-02-13

## 2020-02-13 ENCOUNTER — APPOINTMENT (OUTPATIENT)
Dept: CARDIOLOGY | Facility: CLINIC | Age: 85
End: 2020-02-13
Payer: MEDICARE

## 2020-02-13 VITALS
OXYGEN SATURATION: 95 % | DIASTOLIC BLOOD PRESSURE: 77 MMHG | WEIGHT: 222 LBS | HEART RATE: 86 BPM | SYSTOLIC BLOOD PRESSURE: 123 MMHG | HEIGHT: 73 IN | BODY MASS INDEX: 29.42 KG/M2

## 2020-02-13 DIAGNOSIS — Z00.00 ENCOUNTER FOR GENERAL ADULT MEDICAL EXAMINATION W/OUT ABNORMAL FINDINGS: ICD-10-CM

## 2020-02-13 DIAGNOSIS — Z00.8 ENCOUNTER FOR OTHER GENERAL EXAMINATION: ICD-10-CM

## 2020-02-13 PROCEDURE — 99214 OFFICE O/P EST MOD 30 MIN: CPT

## 2020-02-13 PROCEDURE — 93000 ELECTROCARDIOGRAM COMPLETE: CPT

## 2020-02-13 PROCEDURE — 71046 X-RAY EXAM CHEST 2 VIEWS: CPT

## 2020-02-13 PROCEDURE — 71046 X-RAY EXAM CHEST 2 VIEWS: CPT | Mod: 26

## 2020-02-13 RX ORDER — DIGOXIN 250 MCG
1 TABLET ORAL
Qty: 0 | Refills: 0 | DISCHARGE

## 2020-02-13 NOTE — H&P ADULT - HISTORY OF PRESENT ILLNESS
85 year old male with PMHx of Afib, DVT on Coumadin, HLD, ADRY presented to cardiologist with dyspnea on exertion.  Nuclear stress test suggestive of apical lateral wall ischemia. Referred for cardiac cath and possible PCI 85 year old male with PMHx of Afib, DVT on Coumadin, HLD, ADRY presented to cardiologist with dyspnea on exertion.  Nuclear stress test suggestive of apical lateral wall ischemia. Referred for cardiac cath and possible PCI.

## 2020-02-13 NOTE — H&P ADULT - NSHPPHYSICALEXAM_GEN_ALL_CORE
Vital Signs : BP  133/70      HR  75     RR  16               Constitutional: well developed, well nourished, no deformities and no acute distress    Neurological: Alert & Oriented x 3, GRANT, no focal deficits    HEENT: NC/AT, PERRLA, EOMI,  Neck supple.    Respiratory: CTA B/L, No wheezing/crackles/rhonchi    Cardiovascular: (+) irregular S1 & S2,    Gastrointestinal: soft, NT, nondistended, (+) BS    Genitourinary: non distended bladder, voiding freely    Extremities: No pedal edema, No clubbing, No cyanosis    Skin:  normal skin color and pigmentation, no skin lesions

## 2020-02-13 NOTE — H&P ADULT - PROBLEM SELECTOR PLAN 1
Referred for LHC and possible PCI Pt is referred for Lt heart cath/possible PCI. Labs & medications are reviewed. Informed consent obtained after discussion of [facility] risks, benefits and alternatives  with patient. Risk discussed included, but not limited to MI, stroke, mortality, major bleeding, arrhythmia, or infection.  An educational material provided. Pt. verbalizes understandings of pre-procedural instructions.

## 2020-02-13 NOTE — H&P ADULT - NSHPREVIEWOFSYSTEMS_GEN_ALL_CORE
General: Pt denies recent weight loss/fever/chills    Neurological: denies numbness or  sensation loss    Cardiovascular: denies chest pain/palpitations/leg edema    Respiratory and Thorax: denies cough/wheezing (+)NORMAN    Gastrointestinal: denies abdominal pain/diarrhea/constipation/bloody stool    Genitourinary: denies urinary frequency/urgency/ dysuria    Musculoskeletal: denies joint pain or swelling, denies restricted motion    Hematologic: denies abnormal bleeding

## 2020-02-13 NOTE — H&P ADULT - NSICDXPASTMEDICALHX_GEN_ALL_CORE_FT
PAST MEDICAL HISTORY:  Afib     DVT (deep venous thrombosis)     Factor V Leiden     H/O cardiomyopathy     Hyperlipemia

## 2020-02-13 NOTE — ASU PATIENT PROFILE, ADULT - VISION (WITH CORRECTIVE LENSES IF THE PATIENT USUALLY WEARS THEM):
Partially impaired: cannot see medication labels or newsprint, but can see obstacles in path, and the surrounding layout; can count fingers at arm's length/B/L hearing aids

## 2020-02-13 NOTE — H&P ADULT - ASSESSMENT
85 year old male with PMHx of Afib, DVT on Coumadin, HLD, ADRY presented to cardiologist with dyspnea on exertion.  Nuclear stress test suggestive of apical lateral wall ischemia. Referred for cardiac cath and possible PCI      ASA:  Bleeding  Risk score:  Creatinine:  GFR:

## 2020-02-14 ENCOUNTER — OUTPATIENT (OUTPATIENT)
Dept: OUTPATIENT SERVICES | Facility: HOSPITAL | Age: 85
LOS: 1 days | Discharge: HOME | End: 2020-02-14
Payer: MEDICARE

## 2020-02-14 VITALS
HEART RATE: 76 BPM | RESPIRATION RATE: 16 BRPM | SYSTOLIC BLOOD PRESSURE: 128 MMHG | OXYGEN SATURATION: 98 % | DIASTOLIC BLOOD PRESSURE: 88 MMHG

## 2020-02-14 VITALS
HEIGHT: 74 IN | SYSTOLIC BLOOD PRESSURE: 133 MMHG | DIASTOLIC BLOOD PRESSURE: 72 MMHG | WEIGHT: 222.01 LBS | RESPIRATION RATE: 17 BRPM | HEART RATE: 88 BPM | TEMPERATURE: 97 F | OXYGEN SATURATION: 98 %

## 2020-02-14 DIAGNOSIS — R94.39 ABNORMAL RESULT OF OTHER CARDIOVASCULAR FUNCTION STUDY: ICD-10-CM

## 2020-02-14 DIAGNOSIS — Z90.49 ACQUIRED ABSENCE OF OTHER SPECIFIED PARTS OF DIGESTIVE TRACT: Chronic | ICD-10-CM

## 2020-02-14 LAB
ANION GAP SERPL CALC-SCNC: 6 MMOL/L — SIGNIFICANT CHANGE UP (ref 5–17)
BUN SERPL-MCNC: 19 MG/DL — SIGNIFICANT CHANGE UP (ref 7–23)
CALCIUM SERPL-MCNC: 8.7 MG/DL — SIGNIFICANT CHANGE UP (ref 8.5–10.1)
CHLORIDE SERPL-SCNC: 107 MMOL/L — SIGNIFICANT CHANGE UP (ref 96–108)
CO2 SERPL-SCNC: 25 MMOL/L — SIGNIFICANT CHANGE UP (ref 22–31)
CREAT SERPL-MCNC: 1.14 MG/DL — SIGNIFICANT CHANGE UP (ref 0.5–1.3)
GLUCOSE SERPL-MCNC: 136 MG/DL — HIGH (ref 70–99)
HCT VFR BLD CALC: 43.2 % — SIGNIFICANT CHANGE UP (ref 39–50)
HGB BLD-MCNC: 14.3 G/DL — SIGNIFICANT CHANGE UP (ref 13–17)
INR BLD: 1.44 RATIO — HIGH (ref 0.88–1.16)
MCHC RBC-ENTMCNC: 33.1 GM/DL — SIGNIFICANT CHANGE UP (ref 32–36)
MCHC RBC-ENTMCNC: 33.5 PG — SIGNIFICANT CHANGE UP (ref 27–34)
MCV RBC AUTO: 101.2 FL — HIGH (ref 80–100)
PLATELET # BLD AUTO: 129 K/UL — LOW (ref 150–400)
POTASSIUM SERPL-MCNC: 4.5 MMOL/L — SIGNIFICANT CHANGE UP (ref 3.5–5.3)
POTASSIUM SERPL-SCNC: 4.5 MMOL/L — SIGNIFICANT CHANGE UP (ref 3.5–5.3)
PROTHROM AB SERPL-ACNC: 16.2 SEC — HIGH (ref 10–12.9)
RBC # BLD: 4.27 M/UL — SIGNIFICANT CHANGE UP (ref 4.2–5.8)
RBC # FLD: 13.3 % — SIGNIFICANT CHANGE UP (ref 10.3–14.5)
SODIUM SERPL-SCNC: 138 MMOL/L — SIGNIFICANT CHANGE UP (ref 135–145)
WBC # BLD: 5.65 K/UL — SIGNIFICANT CHANGE UP (ref 3.8–10.5)
WBC # FLD AUTO: 5.65 K/UL — SIGNIFICANT CHANGE UP (ref 3.8–10.5)

## 2020-02-14 PROCEDURE — 85610 PROTHROMBIN TIME: CPT

## 2020-02-14 PROCEDURE — 93458 L HRT ARTERY/VENTRICLE ANGIO: CPT | Mod: 26

## 2020-02-14 PROCEDURE — C1894: CPT

## 2020-02-14 PROCEDURE — 93005 ELECTROCARDIOGRAM TRACING: CPT | Mod: XU

## 2020-02-14 PROCEDURE — 93010 ELECTROCARDIOGRAM REPORT: CPT

## 2020-02-14 PROCEDURE — C1769: CPT

## 2020-02-14 PROCEDURE — 80048 BASIC METABOLIC PNL TOTAL CA: CPT

## 2020-02-14 PROCEDURE — 36415 COLL VENOUS BLD VENIPUNCTURE: CPT

## 2020-02-14 PROCEDURE — 93571 IV DOP VEL&/PRESS C FLO 1ST: CPT | Mod: 26,LD

## 2020-02-14 PROCEDURE — 85027 COMPLETE CBC AUTOMATED: CPT

## 2020-02-14 PROCEDURE — 93458 L HRT ARTERY/VENTRICLE ANGIO: CPT

## 2020-02-14 PROCEDURE — 93571 IV DOP VEL&/PRESS C FLO 1ST: CPT

## 2020-02-14 PROCEDURE — C1760: CPT

## 2020-02-14 PROCEDURE — C1887: CPT

## 2020-02-14 NOTE — PACU DISCHARGE NOTE - COMMENTS
patient discharged to home. Right Radial site benign, no hematoma. site soft nontender. dressing clean/dry/intact. IVL D/C'd site benign. Patient without any complaints. Vital signs stable. pt OOB ambulating around unit tolerating well.. Discharge instructions given and understood by patient and spouse via teach back. Will continue to monitor patient status.

## 2020-02-14 NOTE — PROGRESS NOTE ADULT - SUBJECTIVE AND OBJECTIVE BOX
Cath Lab Nurse Practitioner Note  HPI:  85 year old male with PMHx of Afib, DVT on Coumadin, HLD, ADRY presented to cardiologist with dyspnea on exertion.  Nuclear stress test suggestive of apical lateral wall ischemia. Referred for cardiac cath and possible PCI.     s/p LHC : non obstructive CAD  Pt denies chest pain/SOB/palpitations post cath.    Physical exam:  Vital Signs Last 24 Hrs  T(C): 36.3 (14 Feb 2020 13:42), Max: 36.3 (14 Feb 2020 13:42)  T(F): 97.4 (14 Feb 2020 13:42), Max: 97.4 (14 Feb 2020 13:42)  HR: 88   BP: 130/70  RR: 17   SpO2: 98%   Neuro: A&Ox3  Cardiac : (+)S1S2,  Lungs: CTA B/L  Abd: soft, NT, (+)BS  Ext: Rt radial (+) hemoband , 2+ Rt radial pulses    Labs:                        14.3   5.65  )-----------( 129      ( 14 Feb 2020 13:35 )             43.2     02-14    138  |  107  |  19  ----------------------------<  136<H>  4.5   |  25  |  1.14    Ca    8.7      14 Feb 2020 13:35      PT/INR - ( 14 Feb 2020 13:35 )   PT: 16.2 sec;   INR: 1.44 ratio       A/P : CAD, s/p LHC : non obstructive CAD  -encourage po hydration  -Rt radial hemoband to be removed @17:40  -medical Mx for CAD  -resume coumadin tonight  -d/c home today  -f/u with Dr. Loja in 1-2 weeks  -Plan discussed with pt/Dr. Faith

## 2020-02-17 ENCOUNTER — RX RENEWAL (OUTPATIENT)
Age: 85
End: 2020-02-17

## 2020-02-17 DIAGNOSIS — D68.2 HEREDITARY DEFICIENCY OF OTHER CLOTTING FACTORS: ICD-10-CM

## 2020-02-17 DIAGNOSIS — E78.5 HYPERLIPIDEMIA, UNSPECIFIED: ICD-10-CM

## 2020-02-17 DIAGNOSIS — Z88.0 ALLERGY STATUS TO PENICILLIN: ICD-10-CM

## 2020-02-17 DIAGNOSIS — I20.8 OTHER FORMS OF ANGINA PECTORIS: ICD-10-CM

## 2020-02-17 DIAGNOSIS — G47.33 OBSTRUCTIVE SLEEP APNEA (ADULT) (PEDIATRIC): ICD-10-CM

## 2020-02-17 DIAGNOSIS — I25.118 ATHEROSCLEROTIC HEART DISEASE OF NATIVE CORONARY ARTERY WITH OTHER FORMS OF ANGINA PECTORIS: ICD-10-CM

## 2020-02-17 DIAGNOSIS — I48.91 UNSPECIFIED ATRIAL FIBRILLATION: ICD-10-CM

## 2020-02-17 NOTE — DISCUSSION/SUMMARY
[INR Therapeutic] : the patient's INR is therapeutic [Compensated] : compensated [Atrial Fibrillation] : atrial fibrillation [Cardiomyopathy] : cardiomyopathy [Hypertension] : hypertension [Patient] : the patient [Responding to Treatment] : responding to treatment [None] : none [FreeTextEntry1] : results of cardiac catheterization reviewed with patient and wife. Patient will continue his current medications and anticoagulation.

## 2020-02-17 NOTE — REASON FOR VISIT
[Follow-Up - Clinic] : a clinic follow-up of [Anticoagulation] : anticoagulation [Atrial Fibrillation] : atrial fibrillation [Hypertension] : hypertension [FreeTextEntry1] : CM [Hyperlipidemia] : hyperlipidemia

## 2020-02-17 NOTE — HISTORY OF PRESENT ILLNESS
[FreeTextEntry1] : 86 Y/O Gentleman PMH: AF on oral AC/CM/HTN/Hyperlipidemia/DVT/treated ADRY who presents here today in In followup after cardiac catheterization initiated by abnormal stress test and complaints of increasing shortness of breath. Hernia catheterization revealed no change in coronary anatomy and no need for intervention at this time.

## 2020-02-17 NOTE — PHYSICAL EXAM
[General Appearance - Well Developed] : well developed [Well Groomed] : well groomed [Normal Appearance] : normal appearance [General Appearance - In No Acute Distress] : no acute distress [No Deformities] : no deformities [General Appearance - Well Nourished] : well nourished [Normal Conjunctiva] : the conjunctiva exhibited no abnormalities [] : no respiratory distress [Auscultation Breath Sounds / Voice Sounds] : lungs were clear to auscultation bilaterally [Exaggerated Use Of Accessory Muscles For Inspiration] : no accessory muscle use [Respiration, Rhythm And Depth] : normal respiratory rhythm and effort [Heart Rate And Rhythm] : heart rate and rhythm were normal [Heart Sounds] : normal S1 and S2 [Abdomen Soft] : soft [Abnormal Walk] : normal gait [Oriented To Time, Place, And Person] : oriented to person, place, and time [Skin Color & Pigmentation] : normal skin color and pigmentation [FreeTextEntry1] : No LE Edema

## 2020-02-19 PROBLEM — I48.91 UNSPECIFIED ATRIAL FIBRILLATION: Chronic | Status: ACTIVE | Noted: 2020-02-13

## 2020-02-19 PROBLEM — I82.409 ACUTE EMBOLISM AND THROMBOSIS OF UNSPECIFIED DEEP VEINS OF UNSPECIFIED LOWER EXTREMITY: Chronic | Status: ACTIVE | Noted: 2020-02-13

## 2020-02-19 PROBLEM — D68.51 ACTIVATED PROTEIN C RESISTANCE: Chronic | Status: ACTIVE | Noted: 2020-02-13

## 2020-02-19 PROBLEM — Z86.79 PERSONAL HISTORY OF OTHER DISEASES OF THE CIRCULATORY SYSTEM: Chronic | Status: ACTIVE | Noted: 2020-02-13

## 2020-02-19 PROBLEM — E78.5 HYPERLIPIDEMIA, UNSPECIFIED: Chronic | Status: ACTIVE | Noted: 2020-02-13

## 2020-02-21 ENCOUNTER — NON-APPOINTMENT (OUTPATIENT)
Age: 85
End: 2020-02-21

## 2020-02-21 ENCOUNTER — APPOINTMENT (OUTPATIENT)
Dept: CARDIOLOGY | Facility: CLINIC | Age: 85
End: 2020-02-21
Payer: MEDICARE

## 2020-02-21 VITALS
HEART RATE: 92 BPM | OXYGEN SATURATION: 97 % | WEIGHT: 222 LBS | BODY MASS INDEX: 29.42 KG/M2 | SYSTOLIC BLOOD PRESSURE: 130 MMHG | HEIGHT: 73 IN | DIASTOLIC BLOOD PRESSURE: 82 MMHG

## 2020-02-21 PROCEDURE — 93000 ELECTROCARDIOGRAM COMPLETE: CPT

## 2020-02-21 PROCEDURE — 99214 OFFICE O/P EST MOD 30 MIN: CPT

## 2020-02-21 NOTE — PHYSICAL EXAM
[General Appearance - Well Developed] : well developed [Normal Appearance] : normal appearance [Well Groomed] : well groomed [General Appearance - Well Nourished] : well nourished [General Appearance - In No Acute Distress] : no acute distress [No Deformities] : no deformities [Normal Conjunctiva] : the conjunctiva exhibited no abnormalities [] : no respiratory distress [Auscultation Breath Sounds / Voice Sounds] : lungs were clear to auscultation bilaterally [Exaggerated Use Of Accessory Muscles For Inspiration] : no accessory muscle use [Respiration, Rhythm And Depth] : normal respiratory rhythm and effort [Heart Rate And Rhythm] : heart rate and rhythm were normal [Heart Sounds] : normal S1 and S2 [Abdomen Soft] : soft [Abnormal Walk] : normal gait [Skin Color & Pigmentation] : normal skin color and pigmentation [FreeTextEntry1] : No LE Edema  [Oriented To Time, Place, And Person] : oriented to person, place, and time

## 2020-02-21 NOTE — DISCUSSION/SUMMARY
[INR Therapeutic] : the patient's INR is therapeutic [Atrial Fibrillation] : atrial fibrillation [Compensated] : compensated [Cardiomyopathy] : cardiomyopathy [Hypertension] : hypertension [Responding to Treatment] : responding to treatment [None] : none [Patient] : the patient [FreeTextEntry1] : results of cardiac catheterization reviewed with patient and wife. Patient will continue his current medications and anticoagulation.

## 2020-02-21 NOTE — HISTORY OF PRESENT ILLNESS
[FreeTextEntry1] : 84 Y/O Gentleman PMH: AF on oral AC/CM/HTN/Hyperlipidemia/DVT/treated ADRY who presents here today in In followup after cardiac catheterization initiated by abnormal stress test and complaints of increasing shortness of breath. Hernia catheterization revealed no change in coronary anatomy and no need for intervention at this time.\par Pt denies chest pain. No discomfort at cath site

## 2020-02-23 LAB
INR PPP: 1.8 RATIO
POCT-PROTHROMBIN TIME: 21.2 SECS
QUALITY CONTROL: YES

## 2020-04-03 LAB
INR PPP: 2.1
PROTHROMBIN TIME COMMENT: NORMAL
PT BLD: 20.5

## 2020-05-14 LAB
INR PPP: 2.66 RATIO
PT BLD: 31.2 SEC

## 2020-06-25 LAB
INR PPP: 2.38 RATIO
PT BLD: 28.1 SEC

## 2020-08-14 ENCOUNTER — RX RENEWAL (OUTPATIENT)
Age: 85
End: 2020-08-14

## 2020-08-18 LAB
INR PPP: 2.52 RATIO
PT BLD: 28.5 SEC

## 2020-08-26 ENCOUNTER — RX RENEWAL (OUTPATIENT)
Age: 85
End: 2020-08-26

## 2020-09-17 ENCOUNTER — APPOINTMENT (OUTPATIENT)
Dept: CARDIOLOGY | Facility: CLINIC | Age: 85
End: 2020-09-17
Payer: MEDICARE

## 2020-09-17 ENCOUNTER — NON-APPOINTMENT (OUTPATIENT)
Age: 85
End: 2020-09-17

## 2020-09-17 VITALS
OXYGEN SATURATION: 96 % | SYSTOLIC BLOOD PRESSURE: 126 MMHG | DIASTOLIC BLOOD PRESSURE: 90 MMHG | BODY MASS INDEX: 29.69 KG/M2 | HEART RATE: 96 BPM | HEIGHT: 73 IN | WEIGHT: 224 LBS

## 2020-09-17 PROCEDURE — 93000 ELECTROCARDIOGRAM COMPLETE: CPT

## 2020-09-17 PROCEDURE — 99214 OFFICE O/P EST MOD 30 MIN: CPT

## 2020-09-17 NOTE — DISCUSSION/SUMMARY
[INR Therapeutic] : the patient's INR is therapeutic [Atrial Fibrillation] : atrial fibrillation [Compensated] : compensated [Cardiomyopathy] : cardiomyopathy [Hypertension] : hypertension [Responding to Treatment] : responding to treatment [None] : none [Patient] : the patient [FreeTextEntry1] : Reports were evaluated. Pt will continue to exercise as tolerated. He will continue current medications.

## 2020-09-17 NOTE — HISTORY OF PRESENT ILLNESS
[FreeTextEntry1] : 87 Y/O Gentleman PMH: AF on oral AC/CM/HTN/Hyperlipidemia/DVT/treated ADRY who presents here today in In followup Recent cardiac catheterization suggesting medical treatment of stenoses.

## 2020-09-18 LAB
INR PPP: 2.71 RATIO
PT BLD: 30.8 SEC

## 2020-10-30 LAB
INR PPP: 2.21 RATIO
PT BLD: 25.1 SEC

## 2020-12-11 LAB
INR PPP: 1.7
PROTHROMBIN TIME COMMENT: NORMAL
PT BLD: 17.8

## 2020-12-29 LAB
INR PPP: 2.3
PROTHROMBIN TIME COMMENT: NORMAL
PT BLD: 22.9

## 2021-01-27 ENCOUNTER — TRANSCRIPTION ENCOUNTER (OUTPATIENT)
Age: 86
End: 2021-01-27

## 2021-01-27 LAB
INR PPP: 2.2
PROTHROMBIN TIME COMMENT: NORMAL
PT BLD: 22.4

## 2021-02-25 LAB
INR PPP: 2.2
PROTHROMBIN TIME COMMENT: NORMAL

## 2021-02-26 ENCOUNTER — APPOINTMENT (OUTPATIENT)
Dept: CARDIOLOGY | Facility: CLINIC | Age: 86
End: 2021-02-26
Payer: MEDICARE

## 2021-02-26 ENCOUNTER — NON-APPOINTMENT (OUTPATIENT)
Age: 86
End: 2021-02-26

## 2021-02-26 ENCOUNTER — APPOINTMENT (OUTPATIENT)
Dept: NEUROLOGY | Facility: CLINIC | Age: 86
End: 2021-02-26
Payer: MEDICARE

## 2021-02-26 VITALS
SYSTOLIC BLOOD PRESSURE: 140 MMHG | DIASTOLIC BLOOD PRESSURE: 90 MMHG | RESPIRATION RATE: 18 BRPM | WEIGHT: 228 LBS | BODY MASS INDEX: 30.22 KG/M2 | TEMPERATURE: 97.3 F | OXYGEN SATURATION: 96 % | HEIGHT: 73 IN | HEART RATE: 95 BPM

## 2021-02-26 VITALS
TEMPERATURE: 97.3 F | DIASTOLIC BLOOD PRESSURE: 94 MMHG | SYSTOLIC BLOOD PRESSURE: 168 MMHG | WEIGHT: 222 LBS | HEART RATE: 98 BPM | HEIGHT: 73 IN | BODY MASS INDEX: 29.42 KG/M2

## 2021-02-26 DIAGNOSIS — I82.401 ACUTE EMBOLISM AND THROMBOSIS OF UNSPECIFIED DEEP VEINS OF RIGHT LOWER EXTREMITY: ICD-10-CM

## 2021-02-26 PROCEDURE — 99213 OFFICE O/P EST LOW 20 MIN: CPT

## 2021-02-26 PROCEDURE — 99214 OFFICE O/P EST MOD 30 MIN: CPT | Mod: 25

## 2021-02-26 PROCEDURE — 93000 ELECTROCARDIOGRAM COMPLETE: CPT

## 2021-02-26 RX ORDER — HYDROCODONE BITARTRATE AND HOMATROPINE METHYLBROMIDE 5; 1.5 MG/5ML; MG/5ML
5-1.5 SYRUP ORAL
Qty: 120 | Refills: 0 | Status: DISCONTINUED | COMMUNITY
Start: 2020-02-13 | End: 2021-02-26

## 2021-02-26 NOTE — PHYSICAL EXAM
[General Appearance - Alert] : alert [General Appearance - In No Acute Distress] : in no acute distress [Oriented To Time, Place, And Person] : oriented to person, place, and time [Impaired Insight] : insight and judgment were intact [Affect] : the affect was normal [Person] : oriented to person [Place] : oriented to place [Time] : oriented to time [Concentration Intact] : normal concentrating ability [Visual Intact] : visual attention was ~T not ~L decreased [Naming Objects] : no difficulty naming common objects [Repeating Phrases] : no difficulty repeating a phrase [Writing A Sentence] : no difficulty writing a sentence [Fluency] : fluency intact [Comprehension] : comprehension intact [Reading] : reading intact [Past History] : adequate knowledge of personal past history [Cranial Nerves Optic (II)] : visual acuity intact bilaterally,  visual fields full to confrontation, pupils equal round and reactive to light [Cranial Nerves Oculomotor (III)] : extraocular motion intact [Cranial Nerves Trigeminal (V)] : facial sensation intact symmetrically [Cranial Nerves Facial (VII)] : face symmetrical [Cranial Nerves Vestibulocochlear (VIII)] : hearing was intact bilaterally [Cranial Nerves Glossopharyngeal (IX)] : tongue and palate midline [Cranial Nerves Accessory (XI - Cranial And Spinal)] : head turning and shoulder shrug symmetric [Cranial Nerves Hypoglossal (XII)] : there was no tongue deviation with protrusion [Motor Strength] : muscle strength was normal in all four extremities [No Muscle Atrophy] : normal bulk in all four extremities [Sensation Tactile Decrease] : light touch was intact [Romberg's Sign] : a positive Romberg's sign was present [Limited Balance] : the patient's balance was impaired [Past-pointing] : there was no past-pointing [Tremor] : no tremor present [1+] : Patella left 1+ [0] : Ankle jerk left 0 [Plantar Reflex Right Only] : normal on the right [Plantar Reflex Left Only] : normal on the left [___] : absent on the right [___] : absent on the left [FreeTextEntry7] : Distal vibration loss [FreeTextEntry8] : Unsteady gait [Sclera] : the sclera and conjunctiva were normal [PERRL With Normal Accommodation] : pupils were equal in size, round, reactive to light, with normal accommodation [Extraocular Movements] : extraocular movements were intact [Outer Ear] : the ears and nose were normal in appearance [Oropharynx] : the oropharynx was normal [Neck Appearance] : the appearance of the neck was normal [Neck Cervical Mass (___cm)] : no neck mass was observed [Jugular Venous Distention Increased] : there was no jugular-venous distention [Thyroid Diffuse Enlargement] : the thyroid was not enlarged [Thyroid Nodule] : there were no palpable thyroid nodules [Auscultation Breath Sounds / Voice Sounds] : lungs were clear to auscultation bilaterally [Heart Sounds] : normal S1 and S2 [Heart Sounds Gallop] : no gallops [Murmurs] : no murmurs [Heart Sounds Pericardial Friction Rub] : no pericardial rub [Full Pulse] : the pedal pulses are present [Bowel Sounds] : normal bowel sounds [Abdomen Soft] : soft [Abdomen Tenderness] : non-tender [Abdomen Mass (___ Cm)] : no abdominal mass palpated [No CVA Tenderness] : no ~M costovertebral angle tenderness [No Spinal Tenderness] : no spinal tenderness [Nail Clubbing] : no clubbing  or cyanosis of the fingernails [Musculoskeletal - Swelling] : no joint swelling seen [Motor Tone] : muscle strength and tone were normal [FreeTextEntry1] : Unsteady and unable to tandem walk [Skin Color & Pigmentation] : normal skin color and pigmentation [Skin Turgor] : normal skin turgor [] : no rash

## 2021-02-26 NOTE — DISCUSSION/SUMMARY
[FreeTextEntry1] : HTN: Sub optimal Uptitrate enalapril to 5 MG QD OV one month \par \par CAD: Single vessel CAD moderate LAD disease ( Mild RCA disease )  NL LV FX\par \par Valvular Heart disease: Mild MR,Moderate AS, Mild AI, AO 3.7, ascending 4.1 monitor yearly \par \par AF: INR therapeutic rate controlled\par \par DM: Followed with Endocrinologist  \par \par Hyperlipidemia: Continue statin therapy.  LDL goal of 70 \par \par DVT: Has seen Vascular Dr Nunez \par \par Echo/US Aorta ordered \par \par OV one month BP check

## 2021-02-26 NOTE — REASON FOR VISIT
[Follow-Up: _____] : a [unfilled] follow-up visit [Spouse] : spouse [FreeTextEntry1] : Pt coming for follow up fro Neuropathy and unsteady gait  and NPH

## 2021-02-26 NOTE — DISCUSSION/SUMMARY
[FreeTextEntry1] : Patient with unsteady gait with underlying peripheral neuropathy and incidental NPH. \par Noted to have elevated blood pressure.\par Recommend patient to follow up with you and cardiology for adjusting medications.\par History of chronic a fibrillation and diabetes recommend follow up with you and endocrinology and cardiology.\par Continue present medications.\par Exercises and controlled with diet.\par Followup evaluation in one year or as needed.\par Patient education provided and discussed with the patient and his wife.\par \par

## 2021-02-26 NOTE — HISTORY OF PRESENT ILLNESS
[FreeTextEntry1] : 85 Y/O Gentleman PMH:  DM, CAD, AF on oral AC/CM/HTN/Hyperlipidemia/DVT/treated ADRY, peripheral neuropathy  who presents here today for BP check.  Reports pressure high at recent Endo and Reji follow ups \par \par BP today 140/82

## 2021-02-26 NOTE — REASON FOR VISIT
[Follow-Up - Clinic] : a clinic follow-up of [Anticoagulation] : anticoagulation [Aortic Stenosis] : aortic stenosis [Coronary Artery Disease] : coronary artery disease [Hypertension] : hypertension [Medication Management] : Medication management

## 2021-02-26 NOTE — HISTORY OF PRESENT ILLNESS
[FreeTextEntry1] : He is 86-year-old patient coming here for followup evaluation for peripheral neuropathy with imbalance with gait coming here for followup evaluation with incidental NPH and history of chronic a fibrillation and diabetes mellitus stable neurologically per patient's wife not seen since last January.\par Currently taking same medications no changes. Today the pressure noted to be elevated at 168/94. Denies of any headaches, dizziness, focal weakness. Unsteadiness is persisting no new complaints blood sugars are elevated at hemoglobin A1c 7.4 recently seen by endocrinology no medications were recommended recommended diet controlled and exercises.

## 2021-02-26 NOTE — REVIEW OF SYSTEMS
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

## 2021-03-17 ENCOUNTER — APPOINTMENT (OUTPATIENT)
Dept: CARDIOLOGY | Facility: CLINIC | Age: 86
End: 2021-03-17
Payer: MEDICARE

## 2021-03-17 PROCEDURE — 93306 TTE W/DOPPLER COMPLETE: CPT

## 2021-03-26 ENCOUNTER — APPOINTMENT (OUTPATIENT)
Dept: CARDIOLOGY | Facility: CLINIC | Age: 86
End: 2021-03-26
Payer: MEDICARE

## 2021-03-26 VITALS
WEIGHT: 221 LBS | OXYGEN SATURATION: 96 % | HEART RATE: 84 BPM | BODY MASS INDEX: 29.29 KG/M2 | SYSTOLIC BLOOD PRESSURE: 130 MMHG | HEIGHT: 73 IN | DIASTOLIC BLOOD PRESSURE: 74 MMHG

## 2021-03-26 LAB
INR PPP: 2.2 RATIO
POCT-PROTHROMBIN TIME: 25.9 SECS
QUALITY CONTROL: YES

## 2021-03-26 PROCEDURE — 99214 OFFICE O/P EST MOD 30 MIN: CPT

## 2021-03-26 PROCEDURE — 93000 ELECTROCARDIOGRAM COMPLETE: CPT

## 2021-03-26 NOTE — HISTORY OF PRESENT ILLNESS
[FreeTextEntry1] : 85 Y/O Gentleman PMH:  DM, CAD, AF on oral AC/CM/HTN/Hyperlipidemia/DVT/treated ADRY, peripheral neuropathy  who presents here today for BP check.  B/P improved today. Pt denies chest pain or shortness of breath.

## 2021-03-26 NOTE — DISCUSSION/SUMMARY
[FreeTextEntry1] : HTN: Continue enalapril\par CAD: Single vessel CAD moderate LAD disease ( Mild RCA disease )  NL LV FX\par \par Valvular Heart disease: Mild MR,Moderate AS, Mild AI, AO 3.7, ascending 4.1 monitor yearly \par \par AF: INR therapeutic rate controlled\par \par DM: Followed with Endocrinologist  \par \par Hyperlipidemia: Continue statin therapy.  LDL goal of 70 \par \par DVT: Has seen Vascular Dr Nunez \par \par Echo/US Aorta ordered \par \par OV one month BP check

## 2021-03-28 ENCOUNTER — RX RENEWAL (OUTPATIENT)
Age: 86
End: 2021-03-28

## 2021-05-07 LAB
INR PPP: 2.2
PROTHROMBIN TIME COMMENT: NORMAL
PT BLD: 22.2

## 2021-06-23 LAB
INR PPP: 2.1
PT BLD: 21.9

## 2021-07-28 LAB
INR PPP: 2.5
PROTHROMBIN TIME COMMENT: NORMAL

## 2021-09-29 LAB
INR PPP: 2.4
PROTHROMBIN TIME COMMENT: NORMAL
PT BLD: 24

## 2021-10-14 ENCOUNTER — APPOINTMENT (OUTPATIENT)
Dept: CARDIOLOGY | Facility: CLINIC | Age: 86
End: 2021-10-14
Payer: MEDICARE

## 2021-10-14 ENCOUNTER — NON-APPOINTMENT (OUTPATIENT)
Age: 86
End: 2021-10-14

## 2021-10-14 VITALS
HEIGHT: 73 IN | BODY MASS INDEX: 29.95 KG/M2 | DIASTOLIC BLOOD PRESSURE: 72 MMHG | SYSTOLIC BLOOD PRESSURE: 112 MMHG | OXYGEN SATURATION: 97 % | HEART RATE: 83 BPM | WEIGHT: 226 LBS

## 2021-10-14 PROCEDURE — 93000 ELECTROCARDIOGRAM COMPLETE: CPT

## 2021-10-14 PROCEDURE — 99214 OFFICE O/P EST MOD 30 MIN: CPT

## 2021-10-14 NOTE — DISCUSSION/SUMMARY
[FreeTextEntry1] : HTN: Continue enalapril\par CAD: Single vessel CAD moderate LAD disease ( Mild RCA disease )  NL LV FX\par \par Valvular Heart disease: Mild MR,Moderate AS, Mild AI, AO 3.7, ascending 4.1 monitor yearly \par \par AF: INR therapeutic rate controlled\par \par DM: Followed with Endocrinologist  Hgb A1C elevated\par \par Hyperlipidemia: Continue statin Crestor therapy.  LDL goal of 70 \par \par DVT: Has seen Vascular Dr Nunez \par \par Echo/US Aorta reviewed\par OV 3 month BP check

## 2021-10-14 NOTE — HISTORY OF PRESENT ILLNESS
[FreeTextEntry1] : 86 Y/O Gentleman PMH:  DM, CAD, AF on oral AC/CM/HTN/Hyperlipidemia/DVT/treated ADRY, peripheral neuropathy  who presents here today for BP and atrial fibrillation. Pt denies chest pain or shortness of breath. Pt is active and has recently returned from [facility].

## 2021-10-25 LAB
INR PPP: 2.1
PROTHROMBIN TIME COMMENT: NORMAL
PT BLD: 21.1

## 2021-11-08 ENCOUNTER — RX RENEWAL (OUTPATIENT)
Age: 86
End: 2021-11-08

## 2021-12-03 LAB
INR PPP: 2
PROTHROMBIN TIME COMMENT: NORMAL
PT BLD: 20.3

## 2021-12-16 ENCOUNTER — INPATIENT (INPATIENT)
Facility: HOSPITAL | Age: 86
LOS: 6 days | Discharge: NURSING, REHAB OR LONG-TERM CARE | DRG: 522 | End: 2021-12-23
Attending: INTERNAL MEDICINE | Admitting: STUDENT IN AN ORGANIZED HEALTH CARE EDUCATION/TRAINING PROGRAM
Payer: MEDICARE

## 2021-12-16 VITALS
TEMPERATURE: 98 F | WEIGHT: 225.09 LBS | HEART RATE: 98 BPM | RESPIRATION RATE: 18 BRPM | DIASTOLIC BLOOD PRESSURE: 101 MMHG | OXYGEN SATURATION: 95 % | SYSTOLIC BLOOD PRESSURE: 171 MMHG | HEIGHT: 74 IN

## 2021-12-16 DIAGNOSIS — Z90.49 ACQUIRED ABSENCE OF OTHER SPECIFIED PARTS OF DIGESTIVE TRACT: Chronic | ICD-10-CM

## 2021-12-16 DIAGNOSIS — S72.001A FRACTURE OF UNSPECIFIED PART OF NECK OF RIGHT FEMUR, INITIAL ENCOUNTER FOR CLOSED FRACTURE: ICD-10-CM

## 2021-12-16 LAB
ALBUMIN SERPL ELPH-MCNC: 3.9 G/DL — SIGNIFICANT CHANGE UP (ref 3.3–5)
ALP SERPL-CCNC: 60 U/L — SIGNIFICANT CHANGE UP (ref 40–120)
ALT FLD-CCNC: 48 U/L — SIGNIFICANT CHANGE UP (ref 12–78)
ANION GAP SERPL CALC-SCNC: 5 MMOL/L — SIGNIFICANT CHANGE UP (ref 5–17)
APPEARANCE UR: CLEAR — SIGNIFICANT CHANGE UP
APTT BLD: 39.2 SEC — HIGH (ref 27.5–35.5)
AST SERPL-CCNC: 31 U/L — SIGNIFICANT CHANGE UP (ref 15–37)
BASOPHILS # BLD AUTO: 0.01 K/UL — SIGNIFICANT CHANGE UP (ref 0–0.2)
BASOPHILS NFR BLD AUTO: 0.1 % — SIGNIFICANT CHANGE UP (ref 0–2)
BILIRUB SERPL-MCNC: 1 MG/DL — SIGNIFICANT CHANGE UP (ref 0.2–1.2)
BILIRUB UR-MCNC: NEGATIVE — SIGNIFICANT CHANGE UP
BUN SERPL-MCNC: 20 MG/DL — SIGNIFICANT CHANGE UP (ref 7–23)
CALCIUM SERPL-MCNC: 9.3 MG/DL — SIGNIFICANT CHANGE UP (ref 8.5–10.1)
CHLORIDE SERPL-SCNC: 106 MMOL/L — SIGNIFICANT CHANGE UP (ref 96–108)
CO2 SERPL-SCNC: 27 MMOL/L — SIGNIFICANT CHANGE UP (ref 22–31)
COLOR SPEC: YELLOW — SIGNIFICANT CHANGE UP
CREAT SERPL-MCNC: 1.06 MG/DL — SIGNIFICANT CHANGE UP (ref 0.5–1.3)
DIFF PNL FLD: ABNORMAL
EOSINOPHIL # BLD AUTO: 0.03 K/UL — SIGNIFICANT CHANGE UP (ref 0–0.5)
EOSINOPHIL NFR BLD AUTO: 0.2 % — SIGNIFICANT CHANGE UP (ref 0–6)
GLUCOSE SERPL-MCNC: 169 MG/DL — HIGH (ref 70–99)
GLUCOSE UR QL: NEGATIVE MG/DL — SIGNIFICANT CHANGE UP
HCT VFR BLD CALC: 45.1 % — SIGNIFICANT CHANGE UP (ref 39–50)
HGB BLD-MCNC: 15 G/DL — SIGNIFICANT CHANGE UP (ref 13–17)
IMM GRANULOCYTES NFR BLD AUTO: 0.4 % — SIGNIFICANT CHANGE UP (ref 0–1.5)
INR BLD: 2.29 RATIO — HIGH (ref 0.88–1.16)
KETONES UR-MCNC: ABNORMAL
LEUKOCYTE ESTERASE UR-ACNC: ABNORMAL
LYMPHOCYTES # BLD AUTO: 0.94 K/UL — LOW (ref 1–3.3)
LYMPHOCYTES # BLD AUTO: 7.1 % — LOW (ref 13–44)
MCHC RBC-ENTMCNC: 33.3 GM/DL — SIGNIFICANT CHANGE UP (ref 32–36)
MCHC RBC-ENTMCNC: 33.3 PG — SIGNIFICANT CHANGE UP (ref 27–34)
MCV RBC AUTO: 100.2 FL — HIGH (ref 80–100)
MONOCYTES # BLD AUTO: 0.73 K/UL — SIGNIFICANT CHANGE UP (ref 0–0.9)
MONOCYTES NFR BLD AUTO: 5.5 % — SIGNIFICANT CHANGE UP (ref 2–14)
NEUTROPHILS # BLD AUTO: 11.45 K/UL — HIGH (ref 1.8–7.4)
NEUTROPHILS NFR BLD AUTO: 86.7 % — HIGH (ref 43–77)
NITRITE UR-MCNC: NEGATIVE — SIGNIFICANT CHANGE UP
PH UR: 6 — SIGNIFICANT CHANGE UP (ref 5–8)
PLATELET # BLD AUTO: 179 K/UL — SIGNIFICANT CHANGE UP (ref 150–400)
POTASSIUM SERPL-MCNC: 4.3 MMOL/L — SIGNIFICANT CHANGE UP (ref 3.5–5.3)
POTASSIUM SERPL-SCNC: 4.3 MMOL/L — SIGNIFICANT CHANGE UP (ref 3.5–5.3)
PROT SERPL-MCNC: 8.5 GM/DL — HIGH (ref 6–8.3)
PROT UR-MCNC: 30 MG/DL
PROTHROM AB SERPL-ACNC: 25.5 SEC — HIGH (ref 10.6–13.6)
RBC # BLD: 4.5 M/UL — SIGNIFICANT CHANGE UP (ref 4.2–5.8)
RBC # FLD: 12.6 % — SIGNIFICANT CHANGE UP (ref 10.3–14.5)
SODIUM SERPL-SCNC: 138 MMOL/L — SIGNIFICANT CHANGE UP (ref 135–145)
SP GR SPEC: 1.02 — SIGNIFICANT CHANGE UP (ref 1.01–1.02)
UROBILINOGEN FLD QL: NEGATIVE MG/DL — SIGNIFICANT CHANGE UP
WBC # BLD: 13.21 K/UL — HIGH (ref 3.8–10.5)
WBC # FLD AUTO: 13.21 K/UL — HIGH (ref 3.8–10.5)

## 2021-12-16 PROCEDURE — 71045 X-RAY EXAM CHEST 1 VIEW: CPT | Mod: 26

## 2021-12-16 PROCEDURE — 97110 THERAPEUTIC EXERCISES: CPT | Mod: GP

## 2021-12-16 PROCEDURE — 93005 ELECTROCARDIOGRAM TRACING: CPT

## 2021-12-16 PROCEDURE — 88305 TISSUE EXAM BY PATHOLOGIST: CPT

## 2021-12-16 PROCEDURE — 86850 RBC ANTIBODY SCREEN: CPT

## 2021-12-16 PROCEDURE — 86901 BLOOD TYPING SEROLOGIC RH(D): CPT

## 2021-12-16 PROCEDURE — 73502 X-RAY EXAM HIP UNI 2-3 VIEWS: CPT | Mod: 26,RT

## 2021-12-16 PROCEDURE — P9059: CPT

## 2021-12-16 PROCEDURE — 85027 COMPLETE CBC AUTOMATED: CPT

## 2021-12-16 PROCEDURE — 99285 EMERGENCY DEPT VISIT HI MDM: CPT

## 2021-12-16 PROCEDURE — 97116 GAIT TRAINING THERAPY: CPT | Mod: GP

## 2021-12-16 PROCEDURE — 73501 X-RAY EXAM HIP UNI 1 VIEW: CPT | Mod: RT

## 2021-12-16 PROCEDURE — 86923 COMPATIBILITY TEST ELECTRIC: CPT

## 2021-12-16 PROCEDURE — 82040 ASSAY OF SERUM ALBUMIN: CPT

## 2021-12-16 PROCEDURE — 82306 VITAMIN D 25 HYDROXY: CPT

## 2021-12-16 PROCEDURE — 97530 THERAPEUTIC ACTIVITIES: CPT | Mod: GP

## 2021-12-16 PROCEDURE — 85730 THROMBOPLASTIN TIME PARTIAL: CPT

## 2021-12-16 PROCEDURE — U0003: CPT

## 2021-12-16 PROCEDURE — C9399: CPT

## 2021-12-16 PROCEDURE — 85610 PROTHROMBIN TIME: CPT

## 2021-12-16 PROCEDURE — 87635 SARS-COV-2 COVID-19 AMP PRB: CPT

## 2021-12-16 PROCEDURE — 73552 X-RAY EXAM OF FEMUR 2/>: CPT | Mod: 26,RT

## 2021-12-16 PROCEDURE — C1776: CPT

## 2021-12-16 PROCEDURE — 36430 TRANSFUSION BLD/BLD COMPNT: CPT

## 2021-12-16 PROCEDURE — 97162 PT EVAL MOD COMPLEX 30 MIN: CPT | Mod: GP

## 2021-12-16 PROCEDURE — 36415 COLL VENOUS BLD VENIPUNCTURE: CPT

## 2021-12-16 PROCEDURE — 86900 BLOOD TYPING SEROLOGIC ABO: CPT

## 2021-12-16 PROCEDURE — 80048 BASIC METABOLIC PNL TOTAL CA: CPT

## 2021-12-16 RX ORDER — SODIUM CHLORIDE 9 MG/ML
250 INJECTION INTRAMUSCULAR; INTRAVENOUS; SUBCUTANEOUS ONCE
Refills: 0 | Status: COMPLETED | OUTPATIENT
Start: 2021-12-16 | End: 2021-12-16

## 2021-12-16 RX ORDER — SODIUM CHLORIDE 9 MG/ML
1000 INJECTION, SOLUTION INTRAVENOUS
Refills: 0 | Status: DISCONTINUED | OUTPATIENT
Start: 2021-12-16 | End: 2021-12-18

## 2021-12-16 RX ORDER — L.ACIDOPH/B.ANIMALIS/B.LONGUM 15B CELL
1 CAPSULE ORAL
Qty: 0 | Refills: 0 | DISCHARGE

## 2021-12-16 RX ORDER — ONDANSETRON 8 MG/1
4 TABLET, FILM COATED ORAL ONCE
Refills: 0 | Status: COMPLETED | OUTPATIENT
Start: 2021-12-16 | End: 2021-12-16

## 2021-12-16 RX ORDER — TRANEXAMIC ACID 100 MG/ML
2000 INJECTION, SOLUTION INTRAVENOUS ONCE
Refills: 0 | Status: DISCONTINUED | OUTPATIENT
Start: 2021-12-17 | End: 2021-12-20

## 2021-12-16 RX ORDER — ASPIRIN/CALCIUM CARB/MAGNESIUM 324 MG
1 TABLET ORAL
Qty: 0 | Refills: 0 | DISCHARGE

## 2021-12-16 RX ORDER — HYDROMORPHONE HYDROCHLORIDE 2 MG/ML
0.5 INJECTION INTRAMUSCULAR; INTRAVENOUS; SUBCUTANEOUS ONCE
Refills: 0 | Status: DISCONTINUED | OUTPATIENT
Start: 2021-12-16 | End: 2021-12-16

## 2021-12-16 RX ADMIN — HYDROMORPHONE HYDROCHLORIDE 0.5 MILLIGRAM(S): 2 INJECTION INTRAMUSCULAR; INTRAVENOUS; SUBCUTANEOUS at 20:29

## 2021-12-16 RX ADMIN — SODIUM CHLORIDE 250 MILLILITER(S): 9 INJECTION INTRAMUSCULAR; INTRAVENOUS; SUBCUTANEOUS at 20:29

## 2021-12-16 RX ADMIN — ONDANSETRON 4 MILLIGRAM(S): 8 TABLET, FILM COATED ORAL at 20:29

## 2021-12-16 NOTE — ED PROVIDER NOTE - CLINICAL SUMMARY MEDICAL DECISION MAKING FREE TEXT BOX
Pt with h/o Afib on Coumadin with mechanical trip and fall c/o right hip pain. X-rays. Labs. Pain meds

## 2021-12-16 NOTE — ED PROVIDER NOTE - CARDIAC, MLM
Normal rate, regular rhythm.  Heart sounds S1, S2.  No murmurs, rubs or gallops. Normal distal radial pulses

## 2021-12-16 NOTE — PHARMACOTHERAPY INTERVENTION NOTE - COMMENTS
Medication history complete, reviewed medications with patients wife, Fernanda at [phone] and confirmed with [email or web address].

## 2021-12-16 NOTE — ED PROVIDER NOTE - NEUROLOGICAL, MLM
Alert and oriented, no focal deficits, no motor or sensory deficits. Decreased sensation in right foot.

## 2021-12-16 NOTE — ED PROVIDER NOTE - OBJECTIVE STATEMENT
87 year old male with PMHx of A-Fib, CAD, DVT on Coumadin and right surgical sock, HLD, ADRY presents to the ED c/o pain from right groin to knee s/p trip and fall x today. Pt reports that he tripped over the curb and fell onto his right side. Pt also c/o decreased sensation in right foot. Pt unable to ambulate after the fall. Denies head strike, LOC. Denies h/o HTN, CHF, diabetes. Allergy to Penicillin. No other injuries or complaints. 87 year old male with PMHx of A-Fib, CAD, DVT on Coumadin and right surgical sock, HLD, ADRY presents to the ED c/o pain from right groin to knee s/p trip and fall x today. Pt reports that he tripped over the curb and fell onto his right side. Pt also c/o decreased sensation in right foot. Pt unable to ambulate after the fall. Denies head strike, LOC. Denies h/o HTN, CHF, diabetes. Allergy to Penicillin. No other injuries or complaints.  PMD Dr. Javed

## 2021-12-16 NOTE — ED ADULT NURSE NOTE - NSIMPLEMENTINTERV_GEN_ALL_ED
Implemented All Fall with Harm Risk Interventions:  [place] to call system. Call bell, personal items and telephone within reach. Instruct patient to call for assistance. Room bathroom lighting operational. Non-slip footwear when patient is off stretcher. Physically safe environment: no spills, clutter or unnecessary equipment. Stretcher in lowest position, wheels locked, appropriate side rails in place. Provide visual cue, wrist band, yellow gown, etc. Monitor gait and stability. Monitor for mental status changes and reorient to person, place, and time. Review medications for side effects contributing to fall risk. Reinforce activity limits and safety measures with patient and family. Provide visual clues: red socks.

## 2021-12-16 NOTE — ED ADULT NURSE NOTE - OBJECTIVE STATEMENT
Patient comes to ED for trip and fall today off a curb. patient denies any head injury, denies LOC. Patient denies any pain or discomfort at this time. patient is on coumadin

## 2021-12-16 NOTE — ED ADULT TRIAGE NOTE - CHIEF COMPLAINT QUOTE
Pt BIBEMS from home, c/o trip and fall on curb while getting the mail, less than an hr ago. pt reports no headstrike/LOC. Pt reports "I was able to stand and then I was unable to ambulate due to the pain". pt on coumadin.  aware of pt, no trauma alert initiated at this time.

## 2021-12-17 LAB
ANION GAP SERPL CALC-SCNC: 6 MMOL/L — SIGNIFICANT CHANGE UP (ref 5–17)
APTT BLD: 39 SEC — HIGH (ref 27.5–35.5)
BUN SERPL-MCNC: 23 MG/DL — SIGNIFICANT CHANGE UP (ref 7–23)
CALCIUM SERPL-MCNC: 8.8 MG/DL — SIGNIFICANT CHANGE UP (ref 8.5–10.1)
CHLORIDE SERPL-SCNC: 106 MMOL/L — SIGNIFICANT CHANGE UP (ref 96–108)
CO2 SERPL-SCNC: 25 MMOL/L — SIGNIFICANT CHANGE UP (ref 22–31)
CREAT SERPL-MCNC: 1.18 MG/DL — SIGNIFICANT CHANGE UP (ref 0.5–1.3)
GLUCOSE SERPL-MCNC: 157 MG/DL — HIGH (ref 70–99)
HCT VFR BLD CALC: 41.4 % — SIGNIFICANT CHANGE UP (ref 39–50)
HGB BLD-MCNC: 13.8 G/DL — SIGNIFICANT CHANGE UP (ref 13–17)
INR BLD: 2.45 RATIO — HIGH (ref 0.88–1.16)
MCHC RBC-ENTMCNC: 33.3 GM/DL — SIGNIFICANT CHANGE UP (ref 32–36)
MCHC RBC-ENTMCNC: 33.7 PG — SIGNIFICANT CHANGE UP (ref 27–34)
MCV RBC AUTO: 101 FL — HIGH (ref 80–100)
PLATELET # BLD AUTO: 159 K/UL — SIGNIFICANT CHANGE UP (ref 150–400)
POTASSIUM SERPL-MCNC: 4.3 MMOL/L — SIGNIFICANT CHANGE UP (ref 3.5–5.3)
POTASSIUM SERPL-SCNC: 4.3 MMOL/L — SIGNIFICANT CHANGE UP (ref 3.5–5.3)
PROTHROM AB SERPL-ACNC: 27.5 SEC — HIGH (ref 10.6–13.6)
RBC # BLD: 4.1 M/UL — LOW (ref 4.2–5.8)
RBC # FLD: 12.7 % — SIGNIFICANT CHANGE UP (ref 10.3–14.5)
SARS-COV-2 RNA SPEC QL NAA+PROBE: SIGNIFICANT CHANGE UP
SODIUM SERPL-SCNC: 137 MMOL/L — SIGNIFICANT CHANGE UP (ref 135–145)
WBC # BLD: 12.9 K/UL — HIGH (ref 3.8–10.5)
WBC # FLD AUTO: 12.9 K/UL — HIGH (ref 3.8–10.5)

## 2021-12-17 PROCEDURE — 93010 ELECTROCARDIOGRAM REPORT: CPT

## 2021-12-17 PROCEDURE — 99223 1ST HOSP IP/OBS HIGH 75: CPT | Mod: AI

## 2021-12-17 PROCEDURE — 99223 1ST HOSP IP/OBS HIGH 75: CPT

## 2021-12-17 RX ORDER — OXYCODONE HYDROCHLORIDE 5 MG/1
5 TABLET ORAL EVERY 4 HOURS
Refills: 0 | Status: DISCONTINUED | OUTPATIENT
Start: 2021-12-17 | End: 2021-12-17

## 2021-12-17 RX ORDER — SENNA PLUS 8.6 MG/1
2 TABLET ORAL AT BEDTIME
Refills: 0 | Status: DISCONTINUED | OUTPATIENT
Start: 2021-12-17 | End: 2021-12-20

## 2021-12-17 RX ORDER — OXYCODONE HYDROCHLORIDE 5 MG/1
5 TABLET ORAL EVERY 4 HOURS
Refills: 0 | Status: DISCONTINUED | OUTPATIENT
Start: 2021-12-17 | End: 2021-12-20

## 2021-12-17 RX ORDER — OXYCODONE HYDROCHLORIDE 5 MG/1
10 TABLET ORAL EVERY 4 HOURS
Refills: 0 | Status: DISCONTINUED | OUTPATIENT
Start: 2021-12-17 | End: 2021-12-17

## 2021-12-17 RX ORDER — CARVEDILOL PHOSPHATE 80 MG/1
12.5 CAPSULE, EXTENDED RELEASE ORAL EVERY 12 HOURS
Refills: 0 | Status: DISCONTINUED | OUTPATIENT
Start: 2021-12-17 | End: 2021-12-23

## 2021-12-17 RX ORDER — ATORVASTATIN CALCIUM 80 MG/1
40 TABLET, FILM COATED ORAL AT BEDTIME
Refills: 0 | Status: DISCONTINUED | OUTPATIENT
Start: 2021-12-17 | End: 2021-12-23

## 2021-12-17 RX ORDER — OXYCODONE HYDROCHLORIDE 5 MG/1
10 TABLET ORAL EVERY 4 HOURS
Refills: 0 | Status: DISCONTINUED | OUTPATIENT
Start: 2021-12-17 | End: 2021-12-20

## 2021-12-17 RX ORDER — ACETAMINOPHEN 500 MG
975 TABLET ORAL EVERY 8 HOURS
Refills: 0 | Status: DISCONTINUED | OUTPATIENT
Start: 2021-12-17 | End: 2021-12-20

## 2021-12-17 RX ORDER — ASPIRIN/CALCIUM CARB/MAGNESIUM 324 MG
81 TABLET ORAL DAILY
Refills: 0 | Status: DISCONTINUED | OUTPATIENT
Start: 2021-12-17 | End: 2021-12-23

## 2021-12-17 RX ORDER — OXYCODONE HYDROCHLORIDE 5 MG/1
2.5 TABLET ORAL EVERY 4 HOURS
Refills: 0 | Status: DISCONTINUED | OUTPATIENT
Start: 2021-12-17 | End: 2021-12-17

## 2021-12-17 RX ORDER — ACETAMINOPHEN 500 MG
1000 TABLET ORAL ONCE
Refills: 0 | Status: COMPLETED | OUTPATIENT
Start: 2021-12-17 | End: 2021-12-17

## 2021-12-17 RX ORDER — DIGOXIN 250 MCG
125 TABLET ORAL DAILY
Refills: 0 | Status: DISCONTINUED | OUTPATIENT
Start: 2021-12-17 | End: 2021-12-23

## 2021-12-17 RX ADMIN — OXYCODONE HYDROCHLORIDE 5 MILLIGRAM(S): 5 TABLET ORAL at 02:34

## 2021-12-17 RX ADMIN — Medication 1 TABLET(S): at 12:36

## 2021-12-17 RX ADMIN — OXYCODONE HYDROCHLORIDE 10 MILLIGRAM(S): 5 TABLET ORAL at 17:10

## 2021-12-17 RX ADMIN — Medication 81 MILLIGRAM(S): at 12:36

## 2021-12-17 RX ADMIN — Medication 400 MILLIGRAM(S): at 12:35

## 2021-12-17 RX ADMIN — CARVEDILOL PHOSPHATE 12.5 MILLIGRAM(S): 80 CAPSULE, EXTENDED RELEASE ORAL at 21:22

## 2021-12-17 RX ADMIN — OXYCODONE HYDROCHLORIDE 5 MILLIGRAM(S): 5 TABLET ORAL at 09:45

## 2021-12-17 RX ADMIN — Medication 1000 MILLIGRAM(S): at 12:37

## 2021-12-17 RX ADMIN — Medication 125 MICROGRAM(S): at 12:36

## 2021-12-17 RX ADMIN — Medication 5 MILLIGRAM(S): at 12:36

## 2021-12-17 RX ADMIN — OXYCODONE HYDROCHLORIDE 10 MILLIGRAM(S): 5 TABLET ORAL at 21:22

## 2021-12-17 RX ADMIN — ATORVASTATIN CALCIUM 40 MILLIGRAM(S): 80 TABLET, FILM COATED ORAL at 21:22

## 2021-12-17 RX ADMIN — OXYCODONE HYDROCHLORIDE 10 MILLIGRAM(S): 5 TABLET ORAL at 21:52

## 2021-12-17 RX ADMIN — OXYCODONE HYDROCHLORIDE 10 MILLIGRAM(S): 5 TABLET ORAL at 16:13

## 2021-12-17 RX ADMIN — Medication 975 MILLIGRAM(S): at 21:22

## 2021-12-17 RX ADMIN — OXYCODONE HYDROCHLORIDE 5 MILLIGRAM(S): 5 TABLET ORAL at 08:47

## 2021-12-17 RX ADMIN — OXYCODONE HYDROCHLORIDE 5 MILLIGRAM(S): 5 TABLET ORAL at 03:30

## 2021-12-17 NOTE — H&P ADULT - HISTORY OF PRESENT ILLNESS
86yo/M with PMH afib, h/o DVT x20 years ago, factor V leiden deficiency on chronic Coumadin therapy, CAD, hyperlipidemia, sleep apnea presented s/p mechanical fall. Patient tripped over the curb and fell onto his RT side sustaining RT hip fracture. Denies LOC. NO head trauma 86yo/M with PMH afib chronic, h/o DVT x20 years ago, factor V leiden deficiency on chronic Coumadin therapy, CAD, hyperlipidemia, sleep apnea presented s/p mechanical fall. Patient tripped over the curb and fell onto his RT side sustaining RT hip fracture. Denies LOC. NO head trauma

## 2021-12-17 NOTE — H&P ADULT - NSHPPHYSICALEXAM_GEN_ALL_CORE
Vital Signs Last 24 Hrs  T(C): 37.4 (17 Dec 2021 11:58), Max: 37.4 (17 Dec 2021 11:58)  T(F): 99.3 (17 Dec 2021 11:58), Max: 99.3 (17 Dec 2021 11:58)  HR: 93 (17 Dec 2021 11:58) (55 - 106)  BP: 122/64 (17 Dec 2021 11:58) (122/64 - 163/98)  BP(mean): 118 (16 Dec 2021 18:48) (118 - 118)  RR: 18 (17 Dec 2021 11:58) (18 - 18)  SpO2: 93% (17 Dec 2021 11:58) (92% - 96%)

## 2021-12-17 NOTE — CONSULT NOTE ADULT - ATTENDING COMMENTS
For surgical fixation of right hip femoral neck fracture tomorrow if the patient is stable and INR less than 1.7.

## 2021-12-17 NOTE — PATIENT PROFILE ADULT - BRAND OF COVID-19 VACCINATION
Pt states that he received the Covid 19 as well as a booster shot, but he is unable of the date that he received the vaccines and he doesn't have his vaccination card with him at this time./Pfizer dose 1, 2, and 3

## 2021-12-17 NOTE — CONSULT NOTE ADULT - ASSESSMENT
This is an 87 year old male s/p fall with right hip fracture awaiting OR on Monday. 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 He is very high risk for VTE. Recommend holding warfarin until INR less than 2.0 then initiate UFH until OR. Then bridge post-op with warfarin and therapeutic lovenox.    Plan:  ::Hold warfarin  ::UFH when INR <2.0  ::Post-op bridging recommended  ::Bedrest  ::Ashlie youssef/l    Thank you for this consult, will continue to follow.  Dispo:

## 2021-12-17 NOTE — H&P ADULT - NSHPLABSRESULTS_GEN_ALL_CORE
13.8   12.90 )-----------( 159      ( 17 Dec 2021 07:59 )             41.4     17 Dec 2021 07:59    137    |  106    |  23     ----------------------------<  157    4.3     |  25     |  1.18     Ca    8.8        17 Dec 2021 07:59    TPro  8.5    /  Alb  3.9    /  TBili  1.0    /  DBili  x      /  AST  31     /  ALT  48     /  AlkPhos  60     16 Dec 2021 20:12    LIVER FUNCTIONS - ( 16 Dec 2021 20:12 )  Alb: 3.9 g/dL / Pro: 8.5 gm/dL / ALK PHOS: 60 U/L / ALT: 48 U/L / AST: 31 U/L / GGT: x           PT/INR - ( 17 Dec 2021 07:59 )   PT: 27.5 sec;   INR: 2.45 ratio      PTT - ( 17 Dec 2021 07:59 )  PTT:39.0 sec    Urinalysis Basic - ( 16 Dec 2021 21:25 )  Color: Yellow / Appearance: Clear / S.020 / pH: x  Gluc: x / Ketone: Moderate  / Bili: Negative / Urobili: Negative mg/dL   Blood: x / Protein: 30 mg/dL / Nitrite: Negative   Leuk Esterase: Trace / RBC: 0-2 /HPF / WBC 3-5   Sq Epi: x / Non Sq Epi: Negative / Bacteria: Negative

## 2021-12-17 NOTE — PATIENT PROFILE ADULT - NSPROPTRIGHTSUPPORTPHONE_GEN_A_NUR
Pt takes jamuvia 100mg at home   will hold off oral meds while in hosp  - c/w HSS for now Pt takes jamuvia 100mg at home   will hold off oral meds while in hosp  - c/w HSS for now Pt takes jamuvia 100mg at home   will hold off oral meds while in hosp  - c/w HSS for now Pt takes jamuvia 100mg at home   will hold off oral meds while in hosp  - c/w HSS for now [phone] DISPLAY PLAN FREE TEXT

## 2021-12-17 NOTE — H&P ADULT - ASSESSMENT
#S/p mechanical fall with RT hip fracture  Admit to 2N  Ortho seen  Bedrest  Pain meds prn  Hold Coumadin, can not reverse INR and would allow it to drift down. Once INR<2.0 will start UFH  AC consult appreciated  Patient is medically optimized for OR once INR is acceptable for ortho team    #Afib on coumadin  #H/o DVT/ factor V leiden  Hold coumadin for OR  AC eval appreciated  Start UFH once INR<2.0    #CAD non-obstructive  Cont aspirin, statin  Last ECHO- EF 55%, mild AS    #DVT proph- will start UFH once INR<2.0    #Dispo- inpatient admit, #S/p mechanical fall with RT hip fracture  Admit to 2N  Ortho seen  Bedrest  Pain meds prn  Hold Coumadin, can not reverse INR and would allow it to drift down. Once INR<2.0 will start UFH  AC consult appreciated  EKG- afib  Patient is medically optimized for OR once INR is acceptable for ortho team    #Afib chronic on coumadin  #H/o DVT/ factor V leiden  Hold coumadin for OR  AC eval appreciated  Start UFH once INR<2.0    #CAD non-obstructive  Cont aspirin, statin  Last ECHO- EF 55%, mild AS    #DVT proph- will start UFH once INR<2.0    #Dispo- inpatient admit, pt is medically optimized for OR once INR is acceptable for ortho team

## 2021-12-17 NOTE — PATIENT PROFILE ADULT - FALL HARM RISK - HARM RISK INTERVENTIONS

## 2021-12-17 NOTE — CONSULT NOTE ADULT - PROVIDER SPECIALTY LIST ADULT
Orthopedics
Anticoag Management
For information on Fall & Injury Prevention, visit [email or web address]

## 2021-12-17 NOTE — CONSULT NOTE ADULT - SUBJECTIVE AND OBJECTIVE BOX
HPI:  Patient is a 87y old male who presents with a chief complaint of right hip pain s/p fall.    Consulted by Dr. Grider for VTE prophylaxis, risk stratification, and anticoagulation management.    PAST MEDICAL & SURGICAL HISTORY:  Afib  DVT (deep venous thrombosis)  Hyperlipemia  H/O cardiomyopathy  Factor V Leiden  History of appendectomy    Interval History:  : Patient seen at bedside denying any discomfort. Endorses that he takes 1 mg warfarin daily and INR monitored via [facility] and Dr. Leigh. Advised to eat greens today and tomorrow. When INR less that 2.0 will start UFH until OR Monday     BMI: 28.9    CrCl:63.3    Incision Time:  Procedure End Time:  Tourniquet Time:  EBL:    Caprini VTE Risk Score:   CAPRINI SCORE  AGE RELATED RISK FACTORS                                                       MOBILITY RELATED FACTORS  [ ] Age 41-60 years                                            (1 Point)                  [ ] Bed rest /restricted mobility                      (1 Point)  [ ] Age: 61-74 years                                           (2 Points)                [ ] Plaster cast                                                   (2 Points)  [x ] Age= 75 years                                              (3 Points)                 [ ] Bed bound for more than 72 hours                   (2 Points)    DISEASE RELATED RISK FACTORS                                               GENDER SPECIFIC FACTORS  [ ] Edema in the lower extremities                       (1 Point)           [ ] Pregnancy                                                            (1 Point)  [ ] Varicose veins                                               (1 Point)                  [ ] Post-partum < 6 weeks                                      (1 Point)             [ x] BMI > 25 Kg/m2                                            (1 Point)                  [ ] Hormonal therapy or oral contraception       (1 Point)                 [ ] Sepsis (in the previous month)                        (1 Point)             [ ] History of pregnancy complications                (1Point)  [ ] Pneumonia or serious lung disease                                             [ ] Unexplained or recurrent  (=/>3), premature                                 (In the previous month)                               (1 Point)                birth with toxemia or growth-restricted infant (1 Point)  [ ] Abnormal pulmonary function test            (1 Point)                                   SURGERY RELATED RISK FACTORS  [ ] Acute myocardial infarction                       (1 Point)                  [ ]  Section                                         (1 Point)  [ ] Congestive heart failure (in the previous month) (1 Point)   [ ] Minor surgery   lasting <45 minutes       (1 Point)   [ ] Inflammatory bowel disease                             (1 Point)          [ ] Arthroscopic surgery                                  (2 Points)  [ ] Central venous access                                    (2 Points)            [ ] General surgery lasting >45 minutes      (2 Points)       [ ] Stroke (in the previous month)                  (5 Points)            [ ] Elective major lower extremity arthroplasty (5 Points)                                   [  ] Malignancy (present or past include skin melanoma                                          but exclude  basal skin cell)    (2 points)                                      TRAUMA RELATED RISK FACTORS                HEMATOLOGY RELATED FACTORS                                  [x ] Fracture of the hip, pelvis, or leg                       (5 Points)  [x ] Prior episodes of VTE                                     (3 Points)          [ ] Acute spinal cord injury (in the previous month)  (5 Points)  [ ] Positive family history for VTE                         (3 Points)       [ ] Paralysis (less than 1 month)                          (5 Points)  [ ] Prothrombin 55259 A                                      (3 Points)         [ ] Multiple Trauma (within 1month)                 (5Points)                                                                                                                                                                [ ] Factor V Leiden                                          (3 Points)                                OTHER RISK FACTORS                          [ ] Lupus anticoagulants                                     (3 Points)                     [ ] BMI > 40                          (1 Point)                                                         [ ] Anticardiolipin antibodies                                (3 Points)                 [ ] Smoking                              (1Point)                                                [ ] High homocysteine in the blood                      (3 Points)                [  ] Diabetes requiring insulin (1point)                         [ ] Other congenital or acquired thrombophilia       (3 Points)          [  ] Chemotherapy                   (1 Point)  [ ] Heparin induced thrombocytopenia                  (3 Points)             [  ] Blood Transfusion                (1 point)                                                                                                             Total Score [     12     ]                                                                                                                                                                                                                                                                                                                                                                                                                                [id number] Score: #4    IMPROVE Bleeding Risk Score:4.5      Falls Risk:   High ( x )  Mod (  )  Low (  )      FAMILY HISTORY:  FH: diabetes mellitus    FH: breast cancer      Denies any personal or familial history of clotting or bleeding disorders.    Allergies    penicillin (Unknown)    Intolerances        REVIEW OF SYSTEMS    (  )Fever	        (  )Constipation	(  )SOB				  (  )Headache   (  )Dysuria  (  )Chills	        (  )Melena	        (  )Dyspnea on exertion (  )Dizziness    (  )Polyuria  (  )Nausea      (  )Hematochezia	(  )Cough                          (  )Syncope      (  )Hematuria  (  )Vomiting   (  )Chest Pain	        (  )Wheezing			  (  )Weakness  (  )Diarrhea    (  )Palpitations	(  )Anorexia			  (  )Myalgia       (  x ) Arthralgia    Pertinent positives in HPI and daily subjective. All other systems negative.    Vital Signs Last 24 Hrs  T(C): 37 (21 @ 07:36), Max: 37.2 (21 @ 02:19)  T(F): 98.6 (21 @ 07:36), Max: 98.9 (21 @ 02:19)  HR: 55 (21 @ 07:36) (55 - 106)  BP: 127/74 (21 @ 07:36) (125/65 - 171/101)  BP(mean): 118 (21 @ 18:48) (118 - 118)  RR: 18 (21 @ 07:36) (18 - 18)  SpO2: 92% (21 @ 07:36) (92% - 96%)      PHYSICAL EXAM:    Constitutional: Appears Well    Neurological: A& O x 3    Skin: Warm    Respiratory and Thorax: normal effort; Breath sounds: normal; No rales/wheezing/rhonchi  	  Cardiovascular: S1, S2, regular, NMBR	    Gastrointestinal: BS + x 4Q, nontender	    Genitourinary:  Bladder nondistended, nontender    Musculoskeletal:   General Right:   no muscle/joint tenderness,   normal tone, no joint swelling,   ROM: limited	    General Left:   no muscle/joint tenderness,   normal tone, no joint swelling,   ROM: limited/full    Hip:  Right:     Lower extrems:   Right: no calf tenderness              negative olivia's sign               + pedal pulses    Left:   no calf tenderness              negative olivia's sign               + pedal pulses                          13.8   12.90 )-----------( 159      ( 17 Dec 2021 07:59 )             41.4       12-    137  |  106  |  23  ----------------------------<  157<H>  4.3   |  25  |  1.18    Ca    8.8      17 Dec 2021 07:59    TPro  8.5<H>  /  Alb  3.9  /  TBili  1.0  /  DBili  x   /  AST  31  /  ALT  48  /  AlkPhos  60  12-16      PT/INR - ( 17 Dec 2021 07:59 )   PT: 27.5 sec;   INR: 2.45 ratio         PTT - ( 17 Dec 2021 07:59 )  PTT:39.0 sec				    MEDICATIONS  (STANDING):  acetaminophen     Tablet .. 975 milliGRAM(s) Oral every 8 hours  aspirin enteric coated 81 milliGRAM(s) Oral daily  atorvastatin 40 milliGRAM(s) Oral at bedtime  carvedilol 12.5 milliGRAM(s) Oral every 12 hours  digoxin     Tablet 125 MICROGram(s) Oral daily  enalapril 5 milliGRAM(s) Oral daily  lactated ringers. 1000 milliLiter(s) IV Continuous <Continuous>  multivitamin 1 Tablet(s) Oral daily  senna 2 Tablet(s) Oral at bedtime  tranexamic acid IVPB 2000 milliGRAM(s) IV Intermittent once    **Current DVT Prophylaxis:    LMWH                   (  )  Heparin SQ           (  )  Coumadin             (HOLD  )  Xarelto                  (  )  Eliquis                   (  )  Venodynes           (  x)  Ambulation          ( post-op )  UFH                       ( when INR  < 2.0)  ECASA                   (  )  Contraindicated  (  )           HPI:  Patient is a 87y old male who presents with a chief complaint of right hip pain s/p fall.    Consulted by Dr. Grider for VTE prophylaxis, risk stratification, and anticoagulation management.    PAST MEDICAL & SURGICAL HISTORY:  Afib  DVT (deep venous thrombosis)  Hyperlipemia  H/O cardiomyopathy  Factor V Leiden  History of appendectomy    Interval History:  : Patient seen at bedside denying any discomfort. Endorses that he takes 1 mg warfarin daily and INR monitored via [facility] and Dr. Loja Advised to eat greens today and tomorrow. When INR less that 2.0 will start UFH until OR Monday     BMI: 28.9    CrCl:63.3    Incision Time:  Procedure End Time:  Tourniquet Time:  EBL:    Caprini VTE Risk Score:   CAPRINI SCORE  AGE RELATED RISK FACTORS                                                       MOBILITY RELATED FACTORS  [ ] Age 41-60 years                                            (1 Point)                  [ ] Bed rest /restricted mobility                      (1 Point)  [ ] Age: 61-74 years                                           (2 Points)                [ ] Plaster cast                                                   (2 Points)  [x ] Age= 75 years                                              (3 Points)                 [ ] Bed bound for more than 72 hours                   (2 Points)    DISEASE RELATED RISK FACTORS                                               GENDER SPECIFIC FACTORS  [ ] Edema in the lower extremities                       (1 Point)           [ ] Pregnancy                                                            (1 Point)  [ ] Varicose veins                                               (1 Point)                  [ ] Post-partum < 6 weeks                                      (1 Point)             [ x] BMI > 25 Kg/m2                                            (1 Point)                  [ ] Hormonal therapy or oral contraception       (1 Point)                 [ ] Sepsis (in the previous month)                        (1 Point)             [ ] History of pregnancy complications                (1Point)  [ ] Pneumonia or serious lung disease                                             [ ] Unexplained or recurrent  (=/>3), premature                                 (In the previous month)                               (1 Point)                birth with toxemia or growth-restricted infant (1 Point)  [ ] Abnormal pulmonary function test            (1 Point)                                   SURGERY RELATED RISK FACTORS  [ ] Acute myocardial infarction                       (1 Point)                  [ ]  Section                                         (1 Point)  [ ] Congestive heart failure (in the previous month) (1 Point)   [ ] Minor surgery   lasting <45 minutes       (1 Point)   [ ] Inflammatory bowel disease                             (1 Point)          [ ] Arthroscopic surgery                                  (2 Points)  [ ] Central venous access                                    (2 Points)            [ ] General surgery lasting >45 minutes      (2 Points)       [ ] Stroke (in the previous month)                  (5 Points)            [ ] Elective major lower extremity arthroplasty (5 Points)                                   [  ] Malignancy (present or past include skin melanoma                                          but exclude  basal skin cell)    (2 points)                                      TRAUMA RELATED RISK FACTORS                HEMATOLOGY RELATED FACTORS                                  [x ] Fracture of the hip, pelvis, or leg                       (5 Points)  [x ] Prior episodes of VTE                                     (3 Points)          [ ] Acute spinal cord injury (in the previous month)  (5 Points)  [ ] Positive family history for VTE                         (3 Points)       [ ] Paralysis (less than 1 month)                          (5 Points)  [ ] Prothrombin 62426 A                                      (3 Points)         [ ] Multiple Trauma (within 1month)                 (5Points)                                                                                                                                                                [ ] Factor V Leiden                                          (3 Points)                                OTHER RISK FACTORS                          [ ] Lupus anticoagulants                                     (3 Points)                     [ ] BMI > 40                          (1 Point)                                                         [ ] Anticardiolipin antibodies                                (3 Points)                 [ ] Smoking                              (1Point)                                                [ ] High homocysteine in the blood                      (3 Points)                [  ] Diabetes requiring insulin (1point)                         [ ] Other congenital or acquired thrombophilia       (3 Points)          [  ] Chemotherapy                   (1 Point)  [ ] Heparin induced thrombocytopenia                  (3 Points)             [  ] Blood Transfusion                (1 point)                                                                                                             Total Score [     12     ]                                                                                                                                                                                                                                                                                                                                                                                                                                [id number] Score: #4    IMPROVE Bleeding Risk Score:4.5      Falls Risk:   High ( x )  Mod (  )  Low (  )      FAMILY HISTORY:  FH: diabetes mellitus    FH: breast cancer      Denies any personal or familial history of clotting or bleeding disorders.    Allergies    penicillin (Unknown)    Intolerances        REVIEW OF SYSTEMS    (  )Fever	        (  )Constipation	(  )SOB				  (  )Headache   (  )Dysuria  (  )Chills	        (  )Melena	        (  )Dyspnea on exertion (  )Dizziness    (  )Polyuria  (  )Nausea      (  )Hematochezia	(  )Cough                          (  )Syncope      (  )Hematuria  (  )Vomiting   (  )Chest Pain	        (  )Wheezing			  (  )Weakness  (  )Diarrhea    (  )Palpitations	(  )Anorexia			  (  )Myalgia       (  x ) Arthralgia    Pertinent positives in HPI and daily subjective. All other systems negative.    Vital Signs Last 24 Hrs  T(C): 37 (21 @ 07:36), Max: 37.2 (21 @ 02:19)  T(F): 98.6 (21 @ 07:36), Max: 98.9 (21 @ 02:19)  HR: 55 (21 @ 07:36) (55 - 106)  BP: 127/74 (21 @ 07:36) (125/65 - 171/101)  BP(mean): 118 (21 @ 18:48) (118 - 118)  RR: 18 (21 @ 07:36) (18 - 18)  SpO2: 92% (21 @ 07:36) (92% - 96%)      PHYSICAL EXAM:    Constitutional: Appears Well    Neurological: A& O x 3    Skin: Warm    Respiratory and Thorax: normal effort; Breath sounds: normal; No rales/wheezing/rhonchi  	  Cardiovascular: S1, S2, regular, NMBR	    Gastrointestinal: BS + x 4Q, nontender	    Genitourinary:  Bladder nondistended, nontender    Musculoskeletal:   General Right:   no muscle/joint tenderness,   normal tone, no joint swelling,   ROM: limited	    General Left:   no muscle/joint tenderness,   normal tone, no joint swelling,   ROM: limited/full    Hip:  Right:     Lower extrems:   Right: no calf tenderness              negative olivia's sign               + pedal pulses    Left:   no calf tenderness              negative olivia's sign               + pedal pulses                          13.8   12.90 )-----------( 159      ( 17 Dec 2021 07:59 )             41.4       12-    137  |  106  |  23  ----------------------------<  157<H>  4.3   |  25  |  1.18    Ca    8.8      17 Dec 2021 07:59    TPro  8.5<H>  /  Alb  3.9  /  TBili  1.0  /  DBili  x   /  AST  31  /  ALT  48  /  AlkPhos  60  12-16      PT/INR - ( 17 Dec 2021 07:59 )   PT: 27.5 sec;   INR: 2.45 ratio         PTT - ( 17 Dec 2021 07:59 )  PTT:39.0 sec				    MEDICATIONS  (STANDING):  acetaminophen     Tablet .. 975 milliGRAM(s) Oral every 8 hours  aspirin enteric coated 81 milliGRAM(s) Oral daily  atorvastatin 40 milliGRAM(s) Oral at bedtime  carvedilol 12.5 milliGRAM(s) Oral every 12 hours  digoxin     Tablet 125 MICROGram(s) Oral daily  enalapril 5 milliGRAM(s) Oral daily  lactated ringers. 1000 milliLiter(s) IV Continuous <Continuous>  multivitamin 1 Tablet(s) Oral daily  senna 2 Tablet(s) Oral at bedtime  tranexamic acid IVPB 2000 milliGRAM(s) IV Intermittent once    **Current DVT Prophylaxis:    LMWH                   (  )  Heparin SQ           (  )  Coumadin             (HOLD  )  Xarelto                  (  )  Eliquis                   (  )  Venodynes           (  x)  Ambulation          ( post-op )  UFH                       ( when INR  < 2.0)  ECASA                   (  )  Contraindicated  (  )

## 2021-12-17 NOTE — CONSULT NOTE ADULT - SUBJECTIVE AND OBJECTIVE BOX
Patient is a 87yMale community ambulator without assistive devices who presents to [facility] ED w/ a c/o of R hip pain. Patient states he tripped and fell at home today while walking to the mail box. Denies HS/LOC. Localizing pain to R hip, denies radiation of pain elsewhere. States inability to ambulate immediately following the injury. Denies any numbness or tingling. Denies having any other pain elsewhere. Of note patient on Coumadin, last dose today. No other orthopaedic concerns at this time.    PAST MEDICAL & SURGICAL HISTORY:  Afib  DVT (deep venous thrombosis)  Hyperlipemia  H/O cardiomyopathy  Factor V Leiden  History of appendectomy  penicillin (Unknown)    PHYSICAL EXAM:  T(C): 36.6 (12-16-21 @ 18:48), Max: 36.7 (12-16-21 @ 14:27)  HR: 82 (12-16-21 @ 18:48) (82 - 98)  BP: 163/98 (12-16-21 @ 18:48) (163/98 - 171/101)  RR: 18 (12-16-21 @ 18:48) (18 - 18)  SpO2: 96% (12-16-21 @ 18:48) (95% - 96%)    Gen: NAD, Resting comfortably  RLE  Skin intact, no erythema or ecchymosis  +TTP about the hip. No bony tenderness to palpation  +EHL/FHL/TA/GSC  +SILT L3-S1  + PT  Compartments soft and compressible  No calf tenderness    Secondary Survey:   No TTP over bony prominences, SILT, palpable pulses, full/painless A/PROM, compartments soft. No TTP over spinous processes or paraspinal muscles at C/T/L spine. No palpable step off. No other injuries or complaints.    Imaging:  Xray R hip demonstrates FN fx    A/P:  Patient is a 87y Male w/ R FN fx    -Clinical findings and radiographs were reviewed at length with the patient. Pt refused to consent for surgery and this time and requested revisiting with him in the morning after he discusses with wife.  -Tentative plan for OR pending clearance/consent  -NPO except medications  -IVF while NPO  -Please hold chemical DVT ppx, SCDs okay  -FU Preop labs  -Medical management appreciated by primary medical team, please comment on patients optimization/clearance for OR  -Multimodal Analgesia - recommend low dose opioids and acetaminophen as tolerated  -NWB, bedrest  -PT/OT  -Ice and elevate as tolerated  -Recommend Ca & Vit D supplementation  -Recommend DEXA scan/Osteoporosis workup outpatient and treatment as needed  -Recommend follow up w/ outpatient endocrinologist   -All Patient's and Family Member's questions answered. Patient/family understand and agree w/ plan.  -Will discuss w/ attending and advise if plan changes.    Tyler Kidd M.D.   Orthopaedic Surgery  [facility] [phone]  [place] [phone]  [facility] [place] [id number]  [facility] [id number]

## 2021-12-18 LAB
24R-OH-CALCIDIOL SERPL-MCNC: 23.4 NG/ML — LOW (ref 30–80)
ALBUMIN SERPL ELPH-MCNC: 3.1 G/DL — LOW (ref 3.3–5)
ANION GAP SERPL CALC-SCNC: 6 MMOL/L — SIGNIFICANT CHANGE UP (ref 5–17)
APTT BLD: 35.7 SEC — HIGH (ref 27.5–35.5)
BUN SERPL-MCNC: 28 MG/DL — HIGH (ref 7–23)
CALCIUM SERPL-MCNC: 8.6 MG/DL — SIGNIFICANT CHANGE UP (ref 8.5–10.1)
CHLORIDE SERPL-SCNC: 106 MMOL/L — SIGNIFICANT CHANGE UP (ref 96–108)
CO2 SERPL-SCNC: 25 MMOL/L — SIGNIFICANT CHANGE UP (ref 22–31)
CREAT SERPL-MCNC: 1.13 MG/DL — SIGNIFICANT CHANGE UP (ref 0.5–1.3)
GLUCOSE SERPL-MCNC: 176 MG/DL — HIGH (ref 70–99)
HCT VFR BLD CALC: 42.3 % — SIGNIFICANT CHANGE UP (ref 39–50)
HGB BLD-MCNC: 13.9 G/DL — SIGNIFICANT CHANGE UP (ref 13–17)
INR BLD: 2.12 RATIO — HIGH (ref 0.88–1.16)
MCHC RBC-ENTMCNC: 32.9 GM/DL — SIGNIFICANT CHANGE UP (ref 32–36)
MCHC RBC-ENTMCNC: 33.3 PG — SIGNIFICANT CHANGE UP (ref 27–34)
MCV RBC AUTO: 101.2 FL — HIGH (ref 80–100)
PLATELET # BLD AUTO: 132 K/UL — LOW (ref 150–400)
POTASSIUM SERPL-MCNC: 4.3 MMOL/L — SIGNIFICANT CHANGE UP (ref 3.5–5.3)
POTASSIUM SERPL-SCNC: 4.3 MMOL/L — SIGNIFICANT CHANGE UP (ref 3.5–5.3)
PROTHROM AB SERPL-ACNC: 23.7 SEC — HIGH (ref 10.6–13.6)
RBC # BLD: 4.18 M/UL — LOW (ref 4.2–5.8)
RBC # FLD: 13 % — SIGNIFICANT CHANGE UP (ref 10.3–14.5)
SODIUM SERPL-SCNC: 137 MMOL/L — SIGNIFICANT CHANGE UP (ref 135–145)
WBC # BLD: 13.14 K/UL — HIGH (ref 3.8–10.5)
WBC # FLD AUTO: 13.14 K/UL — HIGH (ref 3.8–10.5)

## 2021-12-18 PROCEDURE — 99231 SBSQ HOSP IP/OBS SF/LOW 25: CPT

## 2021-12-18 PROCEDURE — 99232 SBSQ HOSP IP/OBS MODERATE 35: CPT

## 2021-12-18 RX ORDER — SODIUM CHLORIDE 9 MG/ML
1000 INJECTION INTRAMUSCULAR; INTRAVENOUS; SUBCUTANEOUS
Refills: 0 | Status: DISCONTINUED | OUTPATIENT
Start: 2021-12-18 | End: 2021-12-20

## 2021-12-18 RX ADMIN — ATORVASTATIN CALCIUM 40 MILLIGRAM(S): 80 TABLET, FILM COATED ORAL at 21:32

## 2021-12-18 RX ADMIN — SODIUM CHLORIDE 50 MILLILITER(S): 9 INJECTION INTRAMUSCULAR; INTRAVENOUS; SUBCUTANEOUS at 23:17

## 2021-12-18 RX ADMIN — Medication 1 TABLET(S): at 10:03

## 2021-12-18 RX ADMIN — OXYCODONE HYDROCHLORIDE 10 MILLIGRAM(S): 5 TABLET ORAL at 17:31

## 2021-12-18 RX ADMIN — Medication 975 MILLIGRAM(S): at 13:41

## 2021-12-18 RX ADMIN — SENNA PLUS 2 TABLET(S): 8.6 TABLET ORAL at 21:33

## 2021-12-18 RX ADMIN — CARVEDILOL PHOSPHATE 12.5 MILLIGRAM(S): 80 CAPSULE, EXTENDED RELEASE ORAL at 21:33

## 2021-12-18 RX ADMIN — Medication 975 MILLIGRAM(S): at 21:32

## 2021-12-18 RX ADMIN — Medication 125 MICROGRAM(S): at 10:03

## 2021-12-18 RX ADMIN — CARVEDILOL PHOSPHATE 12.5 MILLIGRAM(S): 80 CAPSULE, EXTENDED RELEASE ORAL at 10:03

## 2021-12-18 RX ADMIN — Medication 5 MILLIGRAM(S): at 10:03

## 2021-12-18 RX ADMIN — Medication 975 MILLIGRAM(S): at 21:33

## 2021-12-18 RX ADMIN — OXYCODONE HYDROCHLORIDE 10 MILLIGRAM(S): 5 TABLET ORAL at 18:15

## 2021-12-18 RX ADMIN — Medication 975 MILLIGRAM(S): at 13:42

## 2021-12-18 NOTE — PROGRESS NOTE ADULT - SUBJECTIVE AND OBJECTIVE BOX
Patient seen and examined at bedside. No acute overnight complaints. Patient is aware and agreeable to the OR plan, pending INR this AM. Denies fever, chills.    Vital Signs Last 24 Hrs  T(C): 36.6 (18 Dec 2021 00:05), Max: 37.4 (17 Dec 2021 11:58)  T(F): 97.9 (18 Dec 2021 00:05), Max: 99.3 (17 Dec 2021 11:58)  HR: 95 (18 Dec 2021 00:05) (55 - 98)  BP: 139/81 (18 Dec 2021 00:05) (122/64 - 139/81)  BP(mean): --  RR: 18 (18 Dec 2021 00:05) (18 - 18)  SpO2: 92% (18 Dec 2021 00:05) (92% - 93%)    Gen: NAD, Resting comfortably  RLE  Skin intact, no erythema or ecchymosis  +TTP about the hip. No bony tenderness to palpation  +EHL/FHL/TA/GSC  +SILT L3-S1  + PT  Compartments soft and compressible  No calf tenderness    A/P:  Patient is a 87y Male w/ R FN fx    -Tentative plan for OR today pending AM INR  -NPO except medications  -IVF while NPO  -Please hold chemical DVT ppx, SCDs okay  -FU Preop labs  -Medical management appreciated by primary medical team, clearance documented  -Multimodal Analgesia - recommend low dose opioids and acetaminophen as tolerated  -NWB, bedrest  -All Patient's and Family Member's questions answered. Patient/family understand and agree w/ plan.  -Attending is aware and agrees with plan

## 2021-12-18 NOTE — PROGRESS NOTE ADULT - SUBJECTIVE AND OBJECTIVE BOX
CC-  S/p mechanical fall (18 Dec 2021 09:39)    HPI:  88yo/M with PMH afib chronic, h/o DVT x20 years ago, factor V leiden deficiency on chronic Coumadin therapy, CAD, hyperlipidemia, sleep apnea presented s/p mechanical fall. Patient tripped over the curb and fell onto his RT side sustaining RT hip fracture. Denies LOC. NO head trauma (17 Dec 2021 14:41)    21- INR still elevated, OR postponed till tomorrow tentatively. Pt is comfortable    Review of system- All 10 systems reviewed and is as per HPI otherwise negative.     T(C): 36.2 (21 @ 08:29), Max: 36.7 (21 @ 19:34)  HR: 81 (21 @ 08:29) (81 - 98)  BP: 121/69 (21 @ 08:29) (121/69 - 139/81)  RR: 18 (21 @ 08:29) (18 - 18)  SpO2: 94% (21 @ 08:29) (92% - 94%)  Wt(kg): --    LABS:                        13.9   13.14 )-----------( 132      ( 18 Dec 2021 07:23 )             42.3         137  |  106  |  28<H>  ----------------------------<  176<H>  4.3   |  25  |  1.13    Ca    8.6      18 Dec 2021 07:23    TPro  x   /  Alb  3.1<L>  /  TBili  x   /  DBili  x   /  AST  x   /  ALT  x   /  AlkPhos  x   -    PT/INR - ( 18 Dec 2021 07:23 )   PT: 23.7 sec;   INR: 2.12 ratio      PTT - ( 18 Dec 2021 07:23 )  PTT:35.7 sec    Urinalysis Basic - ( 16 Dec 2021 21:25 )  Color: Yellow / Appearance: Clear / S.020 / pH: x  Gluc: x / Ketone: Moderate  / Bili: Negative / Urobili: Negative mg/dL   Blood: x / Protein: 30 mg/dL / Nitrite: Negative   Leuk Esterase: Trace / RBC: 0-2 /HPF / WBC 3-5   Sq Epi: x / Non Sq Epi: Negative / Bacteria: Negative    RADIOLOGY & ADDITIONAL TESTS:  ACC: [id number] EXAM:  XR FEMUR 2 VIEWS RT                        ACC: [id number] EXAM:  XR HIP WITH PELV 2-3V RT                          PROCEDURE DATE:  2021          INTERPRETATION:  Right hip pain following fall.    3 views of the right hip including pelvis, and 2 views of the right femur.    There is a mildly displaced subcapital femoral neck fracture with   external rotation of the hip.  The pelvic ring appears intact.    Soft tissue vascular calcifications are present.    IMPRESSION:    Findings as discussed above.     XR CHEST PORTABLE URGENT 1V                          PROCEDURE DATE:  2021          INTERPRETATION:  Chest portable.    Clinical History: Fall, right hip fracture.    Comparison: 2020.    Single AP view submitted.    Overlying metallic external artifact.    The evaluation of the cardiomediastinal silhouette is limited on portable   technique.  Mildly tortuous, calcified aorta.    There is mild elevation of the right hemidiaphragm.  There is prominence of the bronchovascular markings at the bilateral   perihilar and lower lung zones, without lobar lung consolidation or   significant pleural effusion.    The evaluation for acute, nondisplaced rib fractures is limited on this   exam.    Impression:    Findings as discussed above.    PHYSICAL EXAM:  GENERAL: NAD, well-groomed, well-developed  HEAD:  Atraumatic, Normocephalic  EYES: EOMI, PERRLA, conjunctiva and sclera clear  HEENT: Moist mucous membranes  NECK: Supple, No JVD  NERVOUS SYSTEM:  Alert & Oriented X3, Motor Strength 5/5 B/L upper and lower extremities; DTRs 2+ intact and symmetric  CHEST/LUNG: Clear to auscultation bilaterally; No rales, rhonchi, wheezing, or rubs  HEART: irregular  ABDOMEN: Soft, Nontender, Nondistended; Bowel sounds present  GENITOURINARY- Voiding, no palpable bladder  EXTREMITIES:  2+ Peripheral Pulses, No clubbing, cyanosis, or edema  MUSCULOSKELTAL- RLE slightly rotated externally  SKIN-no rash, no lesion  CNS- alert, oriented X3, non focal     MEDICATIONS  (STANDING):  acetaminophen     Tablet .. 975 milliGRAM(s) Oral every 8 hours  aspirin enteric coated 81 milliGRAM(s) Oral daily  atorvastatin 40 milliGRAM(s) Oral at bedtime  carvedilol 12.5 milliGRAM(s) Oral every 12 hours  digoxin     Tablet 125 MICROGram(s) Oral daily  enalapril 5 milliGRAM(s) Oral daily  multivitamin 1 Tablet(s) Oral daily  senna 2 Tablet(s) Oral at bedtime  sodium chloride 0.9%. 1000 milliLiter(s) (50 mL/Hr) IV Continuous <Continuous>  tranexamic acid IVPB 2000 milliGRAM(s) IV Intermittent once    MEDICATIONS  (PRN):  oxyCODONE    IR 5 milliGRAM(s) Oral every 4 hours PRN Moderate Pain (4 - 6)  oxyCODONE    IR 10 milliGRAM(s) Oral every 4 hours PRN Severe Pain (7 - 10)    Assessment/Plan  #S/p mechanical fall with RT hip fracture  Ortho seen  Bedrest  Pain meds prn  Hold Coumadin, can not reverse INR and would allow it to drift down. Once INR<2.0 will start UFH  AC consult appreciated  EKG- afib  Patient is medically optimized for OR once INR is acceptable for ortho team    #Afib chronic on coumadin  #H/o DVT/ factor V leiden  Hold coumadin for OR  AC eval appreciated  Start UFH once INR<2.0    #CAD non-obstructive  Cont aspirin, statin  Last ECHO- EF 55%, mild AS    #DVT proph- will start UFH once INR<2.0    #Dispo-  pt is medically optimized for OR once INR is acceptable for ortho team

## 2021-12-18 NOTE — PROGRESS NOTE ADULT - SUBJECTIVE AND OBJECTIVE BOX
HPI:  Patient is a 87y old male who presents with a chief complaint of right hip pain s/p fall.    Consulted by Dr. Grider for VTE prophylaxis, risk stratification, and anticoagulation management.    PAST MEDICAL & SURGICAL HISTORY:  Afib  DVT (deep venous thrombosis)  Hyperlipemia  H/O cardiomyopathy  Factor V Leiden  History of appendectomy    Interval History:  : Patient seen at bedside denying any discomfort. Endorses that he takes 1 mg warfarin daily and INR monitored via [facility] and Dr. Loja Advised to eat greens today and tomorrow. When INR less that 2.0 will start UFH until OR : Patient seen at bedside, INR 2.1, possible surgery tomorrow, advised patient to have Vit K foods   BMI: 28.9    CrCl:63.3    Incision Time:  Procedure End Time:  Tourniquet Time:  EBL:    Caprini VTE Risk Score:   CAPRINI SCORE  AGE RELATED RISK FACTORS                                                       MOBILITY RELATED FACTORS  [ ] Age 41-60 years                                            (1 Point)                  [ ] Bed rest /restricted mobility                      (1 Point)  [ ] Age: 61-74 years                                           (2 Points)                [ ] Plaster cast                                                   (2 Points)  [x ] Age= 75 years                                              (3 Points)                 [ ] Bed bound for more than 72 hours                   (2 Points)    DISEASE RELATED RISK FACTORS                                               GENDER SPECIFIC FACTORS  [ ] Edema in the lower extremities                       (1 Point)           [ ] Pregnancy                                                            (1 Point)  [ ] Varicose veins                                               (1 Point)                  [ ] Post-partum < 6 weeks                                      (1 Point)             [ x] BMI > 25 Kg/m2                                            (1 Point)                  [ ] Hormonal therapy or oral contraception       (1 Point)                 [ ] Sepsis (in the previous month)                        (1 Point)             [ ] History of pregnancy complications                (1Point)  [ ] Pneumonia or serious lung disease                                             [ ] Unexplained or recurrent  (=/>3), premature                                 (In the previous month)                               (1 Point)                birth with toxemia or growth-restricted infant (1 Point)  [ ] Abnormal pulmonary function test            (1 Point)                                   SURGERY RELATED RISK FACTORS  [ ] Acute myocardial infarction                       (1 Point)                  [ ]  Section                                         (1 Point)  [ ] Congestive heart failure (in the previous month) (1 Point)   [ ] Minor surgery   lasting <45 minutes       (1 Point)   [ ] Inflammatory bowel disease                             (1 Point)          [ ] Arthroscopic surgery                                  (2 Points)  [ ] Central venous access                                    (2 Points)            [ ] General surgery lasting >45 minutes      (2 Points)       [ ] Stroke (in the previous month)                  (5 Points)            [ ] Elective major lower extremity arthroplasty (5 Points)                                   [  ] Malignancy (present or past include skin melanoma                                          but exclude  basal skin cell)    (2 points)                                      TRAUMA RELATED RISK FACTORS                HEMATOLOGY RELATED FACTORS                                  [x ] Fracture of the hip, pelvis, or leg                       (5 Points)  [x ] Prior episodes of VTE                                     (3 Points)          [ ] Acute spinal cord injury (in the previous month)  (5 Points)  [ ] Positive family history for VTE                         (3 Points)       [ ] Paralysis (less than 1 month)                          (5 Points)  [ ] Prothrombin 32867 A                                      (3 Points)         [ ] Multiple Trauma (within 1month)                 (5Points)                                                                                                                                                                [ ] Factor V Leiden                                          (3 Points)                                OTHER RISK FACTORS                          [ ] Lupus anticoagulants                                     (3 Points)                     [ ] BMI > 40                          (1 Point)                                                         [ ] Anticardiolipin antibodies                                (3 Points)                 [ ] Smoking                              (1Point)                                                [ ] High homocysteine in the blood                      (3 Points)                [  ] Diabetes requiring insulin (1point)                         [ ] Other congenital or acquired thrombophilia       (3 Points)          [  ] Chemotherapy                   (1 Point)  [ ] Heparin induced thrombocytopenia                  (3 Points)             [  ] Blood Transfusion                (1 point)                                                                                                             Total Score [     12     ]                                                                                                                                                                                                                                                                                                                                                                                                                                [id number] Score: #4    IMPROVE Bleeding Risk Score:4.5      Falls Risk:   High ( x )  Mod (  )  Low (  )      FAMILY HISTORY:  FH: diabetes mellitus    FH: breast cancer      Denies any personal or familial history of clotting or bleeding disorders.    Allergies    penicillin (Unknown)    Intolerances        REVIEW OF SYSTEMS    (  )Fever	        (  )Constipation	(  )SOB				  (  )Headache   (  )Dysuria  (  )Chills	        (  )Melena	        (  )Dyspnea on exertion (  )Dizziness    (  )Polyuria  (  )Nausea      (  )Hematochezia	(  )Cough                          (  )Syncope      (  )Hematuria  (  )Vomiting   (  )Chest Pain	        (  )Wheezing			  (  )Weakness  (  )Diarrhea    (  )Palpitations	(  )Anorexia			  (  )Myalgia       (  x ) Arthralgia    Pertinent positives in HPI and daily subjective. All other systems negative.    Vital Signs Last 24 Hrs  T(C): 36.2 (18 Dec 2021 08:29), Max: 37.4 (17 Dec 2021 11:58)  T(F): 97.1 (18 Dec 2021 08:29), Max: 99.3 (17 Dec 2021 11:58)  HR: 81 (18 Dec 2021 08:29) (81 - 98)  BP: 121/69 (18 Dec 2021 08:29) (121/69 - 139/81)  BP(mean): --  RR: 18 (18 Dec 2021 08:29) (18 - 18)  SpO2: 94% (18 Dec 2021 08:29) (92% - 94%)    PHYSICAL EXAM:    Constitutional: Appears Well    Neurological: A& O x 3    Skin: Warm    Respiratory and Thorax: normal effort; Breath sounds: normal; No rales/wheezing/rhonchi  	  Cardiovascular: S1, S2, regular, NMBR	    Gastrointestinal: BS + x 4Q, nontender	    Genitourinary:  Bladder nondistended, nontender    Musculoskeletal:   General Right:   no muscle/joint tenderness,   normal tone, no joint swelling,   ROM: limited	    General Left:   no muscle/joint tenderness,   normal tone, no joint swelling,   ROM: limited/full    Hip:  Right:     Lower extrems:   Right: no calf tenderness              negative olivia's sign               + pedal pulses    Left:   no calf tenderness              negative olivia's sign               + pedal pulses                           13.9   13.14 )-----------( 132      ( 18 Dec 2021 07:23 )             42.3       12-18    137  |  106  |  28<H>  ----------------------------<  176<H>  4.3   |  25  |  1.13    Ca    8.6      18 Dec 2021 07:23    TPro  x   /  Alb  3.1<L>  /  TBili  x   /  DBili  x   /  AST  x   /  ALT  x   /  AlkPhos  x   1218      PT/INR - ( 18 Dec 2021 07:23 )   PT: 23.7 sec;   INR: 2.12 ratio         PTT - ( 18 Dec 2021 07:23 )  PTT:35.7 sec                     13.8   12.90 )-----------( 159      ( 17 Dec 2021 07:59 )             41.4       12-17    137  |  106  |  23  ----------------------------<  157<H>  4.3   |  25  |  1.18    Ca    8.8      17 Dec 2021 07:59    TPro  8.5<H>  /  Alb  3.9  /  TBili  1.0  /  DBili  x   /  AST  31  /  ALT  48  /  AlkPhos  60  12-16    PT/INR - ( 18 Dec 2021 07:23 )   PT: 23.7 sec;   INR: 2.12 ratio    PT/INR - ( 17 Dec 2021 07:59 )   PT: 27.5 sec;   INR: 2.45 ratio         PTT - ( 17 Dec 2021 07:59 )  PTT:39.0 sec				    MEDICATIONS  (STANDING):  acetaminophen     Tablet .. 975 milliGRAM(s) Oral every 8 hours  aspirin enteric coated 81 milliGRAM(s) Oral daily  atorvastatin 40 milliGRAM(s) Oral at bedtime  carvedilol 12.5 milliGRAM(s) Oral every 12 hours  digoxin     Tablet 125 MICROGram(s) Oral daily  enalapril 5 milliGRAM(s) Oral daily  lactated ringers. 1000 milliLiter(s) IV Continuous <Continuous>  multivitamin 1 Tablet(s) Oral daily  senna 2 Tablet(s) Oral at bedtime  tranexamic acid IVPB 2000 milliGRAM(s) IV Intermittent once    **Current DVT Prophylaxis:    LMWH                   (  )  Heparin SQ           (  )  Coumadin             (HOLD  )  Xarelto                  (  )  Eliquis                   (  )  Venodynes           (  x)  Ambulation          ( post-op )  UFH                       ( when INR  < 2.0)  ECASA                   (  )  Contraindicated  (  )

## 2021-12-19 LAB
ANION GAP SERPL CALC-SCNC: 4 MMOL/L — LOW (ref 5–17)
APTT BLD: 35.1 SEC — SIGNIFICANT CHANGE UP (ref 27.5–35.5)
BUN SERPL-MCNC: 25 MG/DL — HIGH (ref 7–23)
CALCIUM SERPL-MCNC: 8.6 MG/DL — SIGNIFICANT CHANGE UP (ref 8.5–10.1)
CHLORIDE SERPL-SCNC: 108 MMOL/L — SIGNIFICANT CHANGE UP (ref 96–108)
CO2 SERPL-SCNC: 27 MMOL/L — SIGNIFICANT CHANGE UP (ref 22–31)
CREAT SERPL-MCNC: 1.02 MG/DL — SIGNIFICANT CHANGE UP (ref 0.5–1.3)
GLUCOSE SERPL-MCNC: 147 MG/DL — HIGH (ref 70–99)
HCT VFR BLD CALC: 40.6 % — SIGNIFICANT CHANGE UP (ref 39–50)
HGB BLD-MCNC: 13.3 G/DL — SIGNIFICANT CHANGE UP (ref 13–17)
INR BLD: 1.94 RATIO — HIGH (ref 0.88–1.16)
INR BLD: 2.07 RATIO — HIGH (ref 0.88–1.16)
MCHC RBC-ENTMCNC: 32.8 GM/DL — SIGNIFICANT CHANGE UP (ref 32–36)
MCHC RBC-ENTMCNC: 33.3 PG — SIGNIFICANT CHANGE UP (ref 27–34)
MCV RBC AUTO: 101.5 FL — HIGH (ref 80–100)
PLATELET # BLD AUTO: 126 K/UL — LOW (ref 150–400)
POTASSIUM SERPL-MCNC: 4.4 MMOL/L — SIGNIFICANT CHANGE UP (ref 3.5–5.3)
POTASSIUM SERPL-SCNC: 4.4 MMOL/L — SIGNIFICANT CHANGE UP (ref 3.5–5.3)
PROTHROM AB SERPL-ACNC: 22 SEC — HIGH (ref 10.6–13.6)
PROTHROM AB SERPL-ACNC: 23.4 SEC — HIGH (ref 10.6–13.6)
RBC # BLD: 4 M/UL — LOW (ref 4.2–5.8)
RBC # FLD: 12.7 % — SIGNIFICANT CHANGE UP (ref 10.3–14.5)
SARS-COV-2 RNA SPEC QL NAA+PROBE: SIGNIFICANT CHANGE UP
SODIUM SERPL-SCNC: 139 MMOL/L — SIGNIFICANT CHANGE UP (ref 135–145)
WBC # BLD: 10.15 K/UL — SIGNIFICANT CHANGE UP (ref 3.8–10.5)
WBC # FLD AUTO: 10.15 K/UL — SIGNIFICANT CHANGE UP (ref 3.8–10.5)

## 2021-12-19 PROCEDURE — 99231 SBSQ HOSP IP/OBS SF/LOW 25: CPT

## 2021-12-19 PROCEDURE — 99232 SBSQ HOSP IP/OBS MODERATE 35: CPT

## 2021-12-19 RX ADMIN — OXYCODONE HYDROCHLORIDE 10 MILLIGRAM(S): 5 TABLET ORAL at 02:42

## 2021-12-19 RX ADMIN — SENNA PLUS 2 TABLET(S): 8.6 TABLET ORAL at 21:36

## 2021-12-19 RX ADMIN — Medication 975 MILLIGRAM(S): at 05:48

## 2021-12-19 RX ADMIN — Medication 5 MILLIGRAM(S): at 10:46

## 2021-12-19 RX ADMIN — OXYCODONE HYDROCHLORIDE 10 MILLIGRAM(S): 5 TABLET ORAL at 14:00

## 2021-12-19 RX ADMIN — Medication 975 MILLIGRAM(S): at 21:35

## 2021-12-19 RX ADMIN — CARVEDILOL PHOSPHATE 12.5 MILLIGRAM(S): 80 CAPSULE, EXTENDED RELEASE ORAL at 21:36

## 2021-12-19 RX ADMIN — Medication 125 MICROGRAM(S): at 10:46

## 2021-12-19 RX ADMIN — CARVEDILOL PHOSPHATE 12.5 MILLIGRAM(S): 80 CAPSULE, EXTENDED RELEASE ORAL at 10:46

## 2021-12-19 RX ADMIN — OXYCODONE HYDROCHLORIDE 10 MILLIGRAM(S): 5 TABLET ORAL at 03:12

## 2021-12-19 RX ADMIN — ATORVASTATIN CALCIUM 40 MILLIGRAM(S): 80 TABLET, FILM COATED ORAL at 21:36

## 2021-12-19 RX ADMIN — SODIUM CHLORIDE 50 MILLILITER(S): 9 INJECTION INTRAMUSCULAR; INTRAVENOUS; SUBCUTANEOUS at 23:53

## 2021-12-19 RX ADMIN — Medication 81 MILLIGRAM(S): at 13:58

## 2021-12-19 RX ADMIN — Medication 975 MILLIGRAM(S): at 05:49

## 2021-12-19 RX ADMIN — Medication 975 MILLIGRAM(S): at 21:36

## 2021-12-19 RX ADMIN — Medication 975 MILLIGRAM(S): at 14:01

## 2021-12-19 RX ADMIN — Medication 975 MILLIGRAM(S): at 13:58

## 2021-12-19 NOTE — PROGRESS NOTE ADULT - SUBJECTIVE AND OBJECTIVE BOX
CC-  S/p mechanical fall (18 Dec 2021 09:39)    HPI:  86yo/M with PMH afib chronic, h/o DVT x20 years ago, factor V leiden deficiency on chronic Coumadin therapy, CAD, hyperlipidemia, sleep apnea presented s/p mechanical fall. Patient tripped over the curb and fell onto his RT side sustaining RT hip fracture. Denies LOC. NO head trauma.     pt seen and examined this am. Was npo for OR. INR 1.9  No sob/chest pain.     Review of system- All 10 systems reviewed and is as per HPI otherwise negative.     Vital Signs Last 24 Hrs  T(C): 36.4 (19 Dec 2021 00:27), Max: 36.5 (18 Dec 2021 20:49)  T(F): 97.5 (19 Dec 2021 00:27), Max: 97.7 (18 Dec 2021 20:49)  HR: 69 (19 Dec 2021 00:27) (69 - 100)  BP: 114/59 (19 Dec 2021 00:27) (114/59 - 131/71)  RR: 17 (19 Dec 2021 00:27) (17 - 18)  SpO2: 94% (19 Dec 2021 00:27) (94% - 96%)    PHYSICAL EXAM:    GENERAL: Comfortable, no acute distress   HEAD:  Normocephalic, atraumatic  EYES: EOMI, PERRLA  HEENT: Moist mucous membranes  NECK: Supple, No JVD  NERVOUS SYSTEM:  Alert & Oriented X3, non focal  CHEST/LUNG: Clear to auscultation bilaterally  HEART: Regular rate and rhythm  ABDOMEN: Soft, Nontender, Nondistended, Bowel sounds present  GENITOURINARY: Voiding, no palpable bladder  EXTREMITIES:   No clubbing, cyanosis, or edema  MUSCULOSKELTAL-right hip pain with movement.   SKIN-no rash    LABS:                        13.3   10.15 )-----------( 126      ( 19 Dec 2021 07:20 )             40.6     12-19    139  |  108  |  25<H>  ----------------------------<  147<H>  4.4   |  27  |  1.02    Ca    8.6      19 Dec 2021 07:20    TPro  x   /  Alb  3.1<L>  /  TBili  x   /  DBili  x   /  AST  x   /  ALT  x   /  AlkPhos  x   12-18    PT/INR - ( 19 Dec 2021 07:20 )   PT: 22.0 sec;   INR: 1.94 ratio         PTT - ( 19 Dec 2021 07:20 )  PTT:35.1 sec    RADIOLOGY & ADDITIONAL TESTS:  ACC: [id number] EXAM:  XR FEMUR 2 VIEWS RT                        ACC: [id number] EXAM:  XR HIP WITH PELV 2-3V RT                          PROCEDURE DATE:  12/16/2021          INTERPRETATION:  Right hip pain following fall.    3 views of the right hip including pelvis, and 2 views of the right femur.    There is a mildly displaced subcapital femoral neck fracture with   external rotation of the hip.  The pelvic ring appears intact.    Soft tissue vascular calcifications are present.    IMPRESSION:    Findings as discussed above.     XR CHEST PORTABLE URGENT 1V                          PROCEDURE DATE:  12/16/2021          INTERPRETATION:  Chest portable.    Clinical History: Fall, right hip fracture.    Comparison: 2/13/2020.    Single AP view submitted.    Overlying metallic external artifact.    The evaluation of the cardiomediastinal silhouette is limited on portable   technique.  Mildly tortuous, calcified aorta.    There is mild elevation of the right hemidiaphragm.  There is prominence of the bronchovascular markings at the bilateral   perihilar and lower lung zones, without lobar lung consolidation or   significant pleural effusion.    The evaluation for acute, nondisplaced rib fractures is limited on this   exam.    Impression:    Findings as discussed above.  MEDICATIONS  (STANDING):  acetaminophen     Tablet .. 975 milliGRAM(s) Oral every 8 hours  aspirin enteric coated 81 milliGRAM(s) Oral daily  atorvastatin 40 milliGRAM(s) Oral at bedtime  carvedilol 12.5 milliGRAM(s) Oral every 12 hours  digoxin     Tablet 125 MICROGram(s) Oral daily  enalapril 5 milliGRAM(s) Oral daily  multivitamin 1 Tablet(s) Oral daily  senna 2 Tablet(s) Oral at bedtime  sodium chloride 0.9%. 1000 milliLiter(s) (50 mL/Hr) IV Continuous <Continuous>  tranexamic acid IVPB 2000 milliGRAM(s) IV Intermittent once    MEDICATIONS  (PRN):  oxyCODONE    IR 5 milliGRAM(s) Oral every 4 hours PRN Moderate Pain (4 - 6)  oxyCODONE    IR 10 milliGRAM(s) Oral every 4 hours PRN Severe Pain (7 - 10)    Assessment/Plan  #S/p mechanical fall with RT hip fracture  -bed rest  -pain control   -incentive spirometry  -coumadin on hold.   -INR 1.94 today.   -OR rescheduled for tomorrow per ortho given INR  -being followed by a/c services.   -NO MEDICAL CONTRAINDICATIONS FOR OR ONCE INR ACCEPTABLE    #Afib chronic on coumadin  #H/o DVT/ factor V leiden  Hold coumadin for OR  will d/w a/c services re: anticoagulation     #CAD non-obstructive  Cont aspirin, statin  Last ECHO- EF 55%, mild AS    #DVT proph  -inr 1.96, left message for a/c services.

## 2021-12-19 NOTE — PROGRESS NOTE ADULT - SUBJECTIVE AND OBJECTIVE BOX
HPI:  Patient is a 87y old male who presents with a chief complaint of right hip pain s/p fall.    Consulted by Dr. Grider for VTE prophylaxis, risk stratification, and anticoagulation management.    PAST MEDICAL & SURGICAL HISTORY:  Afib  DVT (deep venous thrombosis)  Hyperlipemia  H/O cardiomyopathy  Factor V Leiden  History of appendectomy    Interval History:  : Patient seen at bedside denying any discomfort. Endorses that he takes 1 mg warfarin daily and INR monitored via [facility] and Dr. Loja Advised to eat greens today and tomorrow. When INR less that 2.0 will start UFH until OR : Patient seen at bedside, INR 2.1, possible surgery tomorrow, advised patient to have Vit K foods   : Patient seen at bedside, INR 1.94, pending surgery today    BMI: 28.9    CrCl:63.3    Incision Time:  Procedure End Time:  Tourniquet Time:  EBL:    Caprini VTE Risk Score:   CAPRINI SCORE  AGE RELATED RISK FACTORS                                                       MOBILITY RELATED FACTORS  [ ] Age 41-60 years                                            (1 Point)                  [ ] Bed rest /restricted mobility                      (1 Point)  [ ] Age: 61-74 years                                           (2 Points)                [ ] Plaster cast                                                   (2 Points)  [x ] Age= 75 years                                              (3 Points)                 [ ] Bed bound for more than 72 hours                   (2 Points)    DISEASE RELATED RISK FACTORS                                               GENDER SPECIFIC FACTORS  [ ] Edema in the lower extremities                       (1 Point)           [ ] Pregnancy                                                            (1 Point)  [ ] Varicose veins                                               (1 Point)                  [ ] Post-partum < 6 weeks                                      (1 Point)             [ x] BMI > 25 Kg/m2                                            (1 Point)                  [ ] Hormonal therapy or oral contraception       (1 Point)                 [ ] Sepsis (in the previous month)                        (1 Point)             [ ] History of pregnancy complications                (1Point)  [ ] Pneumonia or serious lung disease                                             [ ] Unexplained or recurrent  (=/>3), premature                                 (In the previous month)                               (1 Point)                birth with toxemia or growth-restricted infant (1 Point)  [ ] Abnormal pulmonary function test            (1 Point)                                   SURGERY RELATED RISK FACTORS  [ ] Acute myocardial infarction                       (1 Point)                  [ ]  Section                                         (1 Point)  [ ] Congestive heart failure (in the previous month) (1 Point)   [ ] Minor surgery   lasting <45 minutes       (1 Point)   [ ] Inflammatory bowel disease                             (1 Point)          [ ] Arthroscopic surgery                                  (2 Points)  [ ] Central venous access                                    (2 Points)            [ ] General surgery lasting >45 minutes      (2 Points)       [ ] Stroke (in the previous month)                  (5 Points)            [ ] Elective major lower extremity arthroplasty (5 Points)                                   [  ] Malignancy (present or past include skin melanoma                                          but exclude  basal skin cell)    (2 points)                                      TRAUMA RELATED RISK FACTORS                HEMATOLOGY RELATED FACTORS                                  [x ] Fracture of the hip, pelvis, or leg                       (5 Points)  [x ] Prior episodes of VTE                                     (3 Points)          [ ] Acute spinal cord injury (in the previous month)  (5 Points)  [ ] Positive family history for VTE                         (3 Points)       [ ] Paralysis (less than 1 month)                          (5 Points)  [ ] Prothrombin 30996 A                                      (3 Points)         [ ] Multiple Trauma (within 1month)                 (5Points)                                                                                                                                                                [ ] Factor V Leiden                                          (3 Points)                                OTHER RISK FACTORS                          [ ] Lupus anticoagulants                                     (3 Points)                     [ ] BMI > 40                          (1 Point)                                                         [ ] Anticardiolipin antibodies                                (3 Points)                 [ ] Smoking                              (1Point)                                                [ ] High homocysteine in the blood                      (3 Points)                [  ] Diabetes requiring insulin (1point)                         [ ] Other congenital or acquired thrombophilia       (3 Points)          [  ] Chemotherapy                   (1 Point)  [ ] Heparin induced thrombocytopenia                  (3 Points)             [  ] Blood Transfusion                (1 point)                                                                                                             Total Score [     12     ]                                                                                                                                                                                                                                                                                                                                                                                                                                [id number] Score: #4    IMPROVE Bleeding Risk Score:4.5      Falls Risk:   High ( x )  Mod (  )  Low (  )      FAMILY HISTORY:  FH: diabetes mellitus    FH: breast cancer      Denies any personal or familial history of clotting or bleeding disorders.    Allergies    penicillin (Unknown)    Intolerances        REVIEW OF SYSTEMS    (  )Fever	        (  )Constipation	(  )SOB				  (  )Headache   (  )Dysuria  (  )Chills	        (  )Melena	        (  )Dyspnea on exertion (  )Dizziness    (  )Polyuria  (  )Nausea      (  )Hematochezia	(  )Cough                          (  )Syncope      (  )Hematuria  (  )Vomiting   (  )Chest Pain	        (  )Wheezing			  (  )Weakness  (  )Diarrhea    (  )Palpitations	(  )Anorexia			  (  )Myalgia       (  x ) Arthralgia    Pertinent positives in HPI and daily subjective. All other systems negative.    Vital Signs Last 24 Hrs  T(C): 36.4 (19 Dec 2021 00:27), Max: 36.5 (18 Dec 2021 20:49)  T(F): 97.5 (19 Dec 2021 00:27), Max: 97.7 (18 Dec 2021 20:49)  HR: 69 (19 Dec 2021 00:27) (69 - 100)  BP: 114/59 (19 Dec 2021 00:27) (114/59 - 131/71)  BP(mean): --  RR: 17 (19 Dec 2021 00:27) (17 - 18)  SpO2: 94% (19 Dec 2021 00:27) (94% - 96%)  PHYSICAL EXAM:    Constitutional: Appears Well    Neurological: A& O x 3    Skin: Warm    Respiratory and Thorax: normal effort; Breath sounds: normal; No rales/wheezing/rhonchi  	  Cardiovascular: S1, S2, regular, NMBR	    Gastrointestinal: BS + x 4Q, nontender	    Genitourinary:  Bladder nondistended, nontender    Musculoskeletal:   General Right:   no muscle/joint tenderness,   normal tone, no joint swelling,   ROM: limited	    General Left:   no muscle/joint tenderness,   normal tone, no joint swelling,   ROM: limited/full    Hip:  Right:     Lower extrems:   Right: no calf tenderness              negative olivia's sign               + pedal pulses    Left:   no calf tenderness              negative olivia's sign               + pedal pulses                             13.3   10.15 )-----------( 126      ( 19 Dec 2021 07:20 )             40.6       12-    139  |  108  |  25<H>  ----------------------------<  147<H>  4.4   |  27  |  1.02    Ca    8.6      19 Dec 2021 07:20    TPro  x   /  Alb  3.1<L>  /  TBili  x   /  DBili  x   /  AST  x   /  ALT  x   /  AlkPhos  x   12-18      PT/INR - ( 19 Dec 2021 07:20 )   PT: 22.0 sec;   INR: 1.94 ratio         PTT - ( 19 Dec 2021 07:20 )  PTT:35.1 sec                    13.9   13.14 )-----------( 132      ( 18 Dec 2021 07:23 )             42.3       12-18    137  |  106  |  28<H>  ----------------------------<  176<H>  4.3   |  25  |  1.13    Ca    8.6      18 Dec 2021 07:23    TPro  x   /  Alb  3.1<L>  /  TBili  x   /  DBili  x   /  AST  x   /  ALT  x   /  AlkPhos  x   12-18    PT/INR - ( 19 Dec 2021 07:20 )   PT: 22.0 sec;   INR: 1.94 ratio    PT/INR - ( 18 Dec 2021 07:23 )   PT: 23.7 sec;   INR: 2.12 ratio         PTT - ( 18 Dec 2021 07:23 )  PTT:35.7 sec                     13.8   12.90 )-----------( 159      ( 17 Dec 2021 07:59 )             41.4       12-    137  |  106  |  23  ----------------------------<  157<H>  4.3   |  25  |  1.18    Ca    8.8      17 Dec 2021 07:59    TPro  8.5<H>  /  Alb  3.9  /  TBili  1.0  /  DBili  x   /  AST  31  /  ALT  48  /  AlkPhos  60  12-16    PT/INR - ( 18 Dec 2021 07:23 )   PT: 23.7 sec;   INR: 2.12 ratio    PT/INR - ( 17 Dec 2021 07:59 )   PT: 27.5 sec;   INR: 2.45 ratio         PTT - ( 17 Dec 2021 07:59 )  PTT:39.0 sec				    MEDICATIONS  (STANDING):  acetaminophen     Tablet .. 975 milliGRAM(s) Oral every 8 hours  aspirin enteric coated 81 milliGRAM(s) Oral daily  atorvastatin 40 milliGRAM(s) Oral at bedtime  carvedilol 12.5 milliGRAM(s) Oral every 12 hours  digoxin     Tablet 125 MICROGram(s) Oral daily  enalapril 5 milliGRAM(s) Oral daily  lactated ringers. 1000 milliLiter(s) IV Continuous <Continuous>  multivitamin 1 Tablet(s) Oral daily  senna 2 Tablet(s) Oral at bedtime  tranexamic acid IVPB 2000 milliGRAM(s) IV Intermittent once    **Current DVT Prophylaxis:    LMWH                   (  )  Heparin SQ           (  )  Coumadin             (HOLD  )  Xarelto                  (  )  Eliquis                   (  )  Venodynes           (  x)  Ambulation          ( post-op )  UFH                       ( when INR  < 2.0)  ECASA                   (  )  Contraindicated  (  )

## 2021-12-19 NOTE — PROGRESS NOTE ADULT - SUBJECTIVE AND OBJECTIVE BOX
Patient seen and examined at bedside. No acute overnight complaints. Patient is aware and agreeable to the OR plan, pending INR this AM. Denies fever, chills, headaches, chest pain, shortness of breath, nausea vomiting, or new orthopaedic complaints.     Vital Signs Last 24 Hrs  ICU Vital Signs Last 24 Hrs  T(C): 36.4 (19 Dec 2021 00:27), Max: 36.5 (18 Dec 2021 20:49)  T(F): 97.5 (19 Dec 2021 00:27), Max: 97.7 (18 Dec 2021 20:49)  HR: 69 (19 Dec 2021 00:27) (69 - 100)  BP: 114/59 (19 Dec 2021 00:27) (114/59 - 131/71)  RR: 17 (19 Dec 2021 00:27) (17 - 18)  SpO2: 94% (19 Dec 2021 00:27) (94% - 96%)      Labs:                        13.9   13.14 )-----------( 132      ( 18 Dec 2021 07:23 )             42.3     12-18    137  |  106  |  28<H>  ----------------------------<  176<H>  4.3   |  25  |  1.13    Ca    8.6      18 Dec 2021 07:23    TPro  x   /  Alb  3.1<L>  /  TBili  x   /  DBili  x   /  AST  x   /  ALT  x   /  AlkPhos  x   12-18    PT/INR - ( 18 Dec 2021 07:23 )   PT: 23.7 sec;   INR: 2.12 ratio         PTT - ( 18 Dec 2021 07:23 )  PTT:35.7 sec      Gen: NAD, Resting comfortably  RLE  Skin intact, no erythema or ecchymosis  +TTP about the hip. No bony tenderness to palpation  +EHL/FHL/TA/GSC  +SILT L3-S1  + PT  Compartments soft and compressible  No calf tenderness    A/P:  Patient is a 87y Male w/ R FN fx    -Tentative plan for OR today pending AM INR  -NPO except medications  -IVF while NPO  -Please hold chemical DVT ppx, SCDs okay  -FU Preop labs  -Medical management appreciated by primary medical team, clearance documented  -Multimodal Analgesia - recommend low dose opioids and acetaminophen as tolerated  -NWB, bedrest  -All Patient's and Family Member's questions answered. Patient/family understand and agree w/ plan.  -Attending is aware and agrees with plan

## 2021-12-20 ENCOUNTER — RESULT REVIEW (OUTPATIENT)
Age: 86
End: 2021-12-20

## 2021-12-20 ENCOUNTER — TRANSCRIPTION ENCOUNTER (OUTPATIENT)
Age: 86
End: 2021-12-20

## 2021-12-20 LAB
ANION GAP SERPL CALC-SCNC: 5 MMOL/L — SIGNIFICANT CHANGE UP (ref 5–17)
ANION GAP SERPL CALC-SCNC: 5 MMOL/L — SIGNIFICANT CHANGE UP (ref 5–17)
APTT BLD: 34.1 SEC — SIGNIFICANT CHANGE UP (ref 27.5–35.5)
APTT BLD: 34.5 SEC — SIGNIFICANT CHANGE UP (ref 27.5–35.5)
APTT BLD: 35.4 SEC — SIGNIFICANT CHANGE UP (ref 27.5–35.5)
BUN SERPL-MCNC: 23 MG/DL — SIGNIFICANT CHANGE UP (ref 7–23)
BUN SERPL-MCNC: 25 MG/DL — HIGH (ref 7–23)
CALCIUM SERPL-MCNC: 8.3 MG/DL — LOW (ref 8.5–10.1)
CALCIUM SERPL-MCNC: 8.3 MG/DL — LOW (ref 8.5–10.1)
CHLORIDE SERPL-SCNC: 108 MMOL/L — SIGNIFICANT CHANGE UP (ref 96–108)
CHLORIDE SERPL-SCNC: 109 MMOL/L — HIGH (ref 96–108)
CO2 SERPL-SCNC: 24 MMOL/L — SIGNIFICANT CHANGE UP (ref 22–31)
CO2 SERPL-SCNC: 25 MMOL/L — SIGNIFICANT CHANGE UP (ref 22–31)
CREAT SERPL-MCNC: 0.91 MG/DL — SIGNIFICANT CHANGE UP (ref 0.5–1.3)
CREAT SERPL-MCNC: 0.92 MG/DL — SIGNIFICANT CHANGE UP (ref 0.5–1.3)
GLUCOSE SERPL-MCNC: 156 MG/DL — HIGH (ref 70–99)
GLUCOSE SERPL-MCNC: 168 MG/DL — HIGH (ref 70–99)
HCT VFR BLD CALC: 39.6 % — SIGNIFICANT CHANGE UP (ref 39–50)
HCT VFR BLD CALC: 40.8 % — SIGNIFICANT CHANGE UP (ref 39–50)
HGB BLD-MCNC: 12.8 G/DL — LOW (ref 13–17)
HGB BLD-MCNC: 13.2 G/DL — SIGNIFICANT CHANGE UP (ref 13–17)
INR BLD: 1.78 RATIO — HIGH (ref 0.88–1.16)
INR BLD: 2.01 RATIO — HIGH (ref 0.88–1.16)
INR BLD: 2.14 RATIO — HIGH (ref 0.88–1.16)
MCHC RBC-ENTMCNC: 32.3 GM/DL — SIGNIFICANT CHANGE UP (ref 32–36)
MCHC RBC-ENTMCNC: 32.4 GM/DL — SIGNIFICANT CHANGE UP (ref 32–36)
MCHC RBC-ENTMCNC: 32.8 PG — SIGNIFICANT CHANGE UP (ref 27–34)
MCHC RBC-ENTMCNC: 32.8 PG — SIGNIFICANT CHANGE UP (ref 27–34)
MCV RBC AUTO: 101.5 FL — HIGH (ref 80–100)
MCV RBC AUTO: 101.5 FL — HIGH (ref 80–100)
PLATELET # BLD AUTO: 139 K/UL — LOW (ref 150–400)
PLATELET # BLD AUTO: 148 K/UL — LOW (ref 150–400)
POTASSIUM SERPL-MCNC: 4.4 MMOL/L — SIGNIFICANT CHANGE UP (ref 3.5–5.3)
POTASSIUM SERPL-MCNC: 4.7 MMOL/L — SIGNIFICANT CHANGE UP (ref 3.5–5.3)
POTASSIUM SERPL-SCNC: 4.4 MMOL/L — SIGNIFICANT CHANGE UP (ref 3.5–5.3)
POTASSIUM SERPL-SCNC: 4.7 MMOL/L — SIGNIFICANT CHANGE UP (ref 3.5–5.3)
PROTHROM AB SERPL-ACNC: 20.3 SEC — HIGH (ref 10.6–13.6)
PROTHROM AB SERPL-ACNC: 22.8 SEC — HIGH (ref 10.6–13.6)
PROTHROM AB SERPL-ACNC: 23.9 SEC — HIGH (ref 10.6–13.6)
RBC # BLD: 3.9 M/UL — LOW (ref 4.2–5.8)
RBC # BLD: 4.02 M/UL — LOW (ref 4.2–5.8)
RBC # FLD: 12.6 % — SIGNIFICANT CHANGE UP (ref 10.3–14.5)
RBC # FLD: 12.9 % — SIGNIFICANT CHANGE UP (ref 10.3–14.5)
SODIUM SERPL-SCNC: 138 MMOL/L — SIGNIFICANT CHANGE UP (ref 135–145)
SODIUM SERPL-SCNC: 138 MMOL/L — SIGNIFICANT CHANGE UP (ref 135–145)
WBC # BLD: 9.4 K/UL — SIGNIFICANT CHANGE UP (ref 3.8–10.5)
WBC # BLD: 9.94 K/UL — SIGNIFICANT CHANGE UP (ref 3.8–10.5)
WBC # FLD AUTO: 9.4 K/UL — SIGNIFICANT CHANGE UP (ref 3.8–10.5)
WBC # FLD AUTO: 9.94 K/UL — SIGNIFICANT CHANGE UP (ref 3.8–10.5)

## 2021-12-20 PROCEDURE — 99232 SBSQ HOSP IP/OBS MODERATE 35: CPT

## 2021-12-20 PROCEDURE — 99231 SBSQ HOSP IP/OBS SF/LOW 25: CPT

## 2021-12-20 PROCEDURE — 73501 X-RAY EXAM HIP UNI 1 VIEW: CPT | Mod: 26,RT

## 2021-12-20 PROCEDURE — 88305 TISSUE EXAM BY PATHOLOGIST: CPT | Mod: 26

## 2021-12-20 RX ORDER — TRANEXAMIC ACID 100 MG/ML
2000 INJECTION, SOLUTION INTRAVENOUS ONCE
Refills: 0 | Status: DISCONTINUED | OUTPATIENT
Start: 2021-12-20 | End: 2021-12-23

## 2021-12-20 RX ORDER — OXYCODONE HYDROCHLORIDE 5 MG/1
5 TABLET ORAL
Refills: 0 | Status: DISCONTINUED | OUTPATIENT
Start: 2021-12-20 | End: 2021-12-23

## 2021-12-20 RX ORDER — OXYCODONE HYDROCHLORIDE 5 MG/1
5 TABLET ORAL ONCE
Refills: 0 | Status: DISCONTINUED | OUTPATIENT
Start: 2021-12-20 | End: 2021-12-20

## 2021-12-20 RX ORDER — SODIUM CHLORIDE 9 MG/ML
1000 INJECTION, SOLUTION INTRAVENOUS
Refills: 0 | Status: DISCONTINUED | OUTPATIENT
Start: 2021-12-20 | End: 2021-12-23

## 2021-12-20 RX ORDER — ONDANSETRON 8 MG/1
4 TABLET, FILM COATED ORAL EVERY 6 HOURS
Refills: 0 | Status: DISCONTINUED | OUTPATIENT
Start: 2021-12-20 | End: 2021-12-23

## 2021-12-20 RX ORDER — POLYETHYLENE GLYCOL 3350 17 G/17G
17 POWDER, FOR SOLUTION ORAL AT BEDTIME
Refills: 0 | Status: DISCONTINUED | OUTPATIENT
Start: 2021-12-20 | End: 2021-12-23

## 2021-12-20 RX ORDER — LANOLIN ALCOHOL/MO/W.PET/CERES
3 CREAM (GRAM) TOPICAL AT BEDTIME
Refills: 0 | Status: DISCONTINUED | OUTPATIENT
Start: 2021-12-20 | End: 2021-12-23

## 2021-12-20 RX ORDER — SODIUM CHLORIDE 9 MG/ML
1000 INJECTION, SOLUTION INTRAVENOUS
Refills: 0 | Status: DISCONTINUED | OUTPATIENT
Start: 2021-12-21 | End: 2021-12-23

## 2021-12-20 RX ORDER — SENNA PLUS 8.6 MG/1
2 TABLET ORAL AT BEDTIME
Refills: 0 | Status: DISCONTINUED | OUTPATIENT
Start: 2021-12-20 | End: 2021-12-23

## 2021-12-20 RX ORDER — SODIUM CHLORIDE 9 MG/ML
1000 INJECTION, SOLUTION INTRAVENOUS
Refills: 0 | Status: DISCONTINUED | OUTPATIENT
Start: 2021-12-20 | End: 2021-12-20

## 2021-12-20 RX ORDER — ACETAMINOPHEN 500 MG
650 TABLET ORAL EVERY 6 HOURS
Refills: 0 | Status: COMPLETED | OUTPATIENT
Start: 2021-12-20 | End: 2021-12-23

## 2021-12-20 RX ORDER — PANTOPRAZOLE SODIUM 20 MG/1
40 TABLET, DELAYED RELEASE ORAL
Refills: 0 | Status: DISCONTINUED | OUTPATIENT
Start: 2021-12-20 | End: 2021-12-23

## 2021-12-20 RX ORDER — BENZOCAINE AND MENTHOL 5; 1 G/100ML; G/100ML
1 LIQUID ORAL
Refills: 0 | Status: DISCONTINUED | OUTPATIENT
Start: 2021-12-20 | End: 2021-12-23

## 2021-12-20 RX ORDER — ONDANSETRON 8 MG/1
4 TABLET, FILM COATED ORAL ONCE
Refills: 0 | Status: DISCONTINUED | OUTPATIENT
Start: 2021-12-20 | End: 2021-12-20

## 2021-12-20 RX ORDER — HEPARIN SODIUM 5000 [USP'U]/ML
5000 INJECTION INTRAVENOUS; SUBCUTANEOUS EVERY 8 HOURS
Refills: 0 | Status: DISCONTINUED | OUTPATIENT
Start: 2021-12-20 | End: 2021-12-21

## 2021-12-20 RX ORDER — ASCORBIC ACID 60 MG
500 TABLET,CHEWABLE ORAL
Refills: 0 | Status: DISCONTINUED | OUTPATIENT
Start: 2021-12-20 | End: 2021-12-23

## 2021-12-20 RX ORDER — FOLIC ACID 0.8 MG
1 TABLET ORAL DAILY
Refills: 0 | Status: DISCONTINUED | OUTPATIENT
Start: 2021-12-20 | End: 2021-12-23

## 2021-12-20 RX ORDER — FENTANYL CITRATE 50 UG/ML
50 INJECTION INTRAVENOUS
Refills: 0 | Status: DISCONTINUED | OUTPATIENT
Start: 2021-12-20 | End: 2021-12-20

## 2021-12-20 RX ORDER — OXYCODONE HYDROCHLORIDE 5 MG/1
10 TABLET ORAL
Refills: 0 | Status: DISCONTINUED | OUTPATIENT
Start: 2021-12-20 | End: 2021-12-23

## 2021-12-20 RX ADMIN — Medication 81 MILLIGRAM(S): at 10:15

## 2021-12-20 RX ADMIN — Medication 975 MILLIGRAM(S): at 05:23

## 2021-12-20 RX ADMIN — ATORVASTATIN CALCIUM 40 MILLIGRAM(S): 80 TABLET, FILM COATED ORAL at 21:28

## 2021-12-20 RX ADMIN — HEPARIN SODIUM 5000 UNIT(S): 5000 INJECTION INTRAVENOUS; SUBCUTANEOUS at 21:26

## 2021-12-20 RX ADMIN — Medication 125 MICROGRAM(S): at 10:15

## 2021-12-20 RX ADMIN — CARVEDILOL PHOSPHATE 12.5 MILLIGRAM(S): 80 CAPSULE, EXTENDED RELEASE ORAL at 10:15

## 2021-12-20 RX ADMIN — OXYCODONE HYDROCHLORIDE 10 MILLIGRAM(S): 5 TABLET ORAL at 01:43

## 2021-12-20 RX ADMIN — Medication 975 MILLIGRAM(S): at 13:41

## 2021-12-20 RX ADMIN — Medication 100 MILLIGRAM(S): at 21:25

## 2021-12-20 RX ADMIN — Medication 5 MILLIGRAM(S): at 10:15

## 2021-12-20 RX ADMIN — Medication 650 MILLIGRAM(S): at 21:28

## 2021-12-20 RX ADMIN — SODIUM CHLORIDE 100 MILLILITER(S): 9 INJECTION, SOLUTION INTRAVENOUS at 18:33

## 2021-12-20 RX ADMIN — SENNA PLUS 2 TABLET(S): 8.6 TABLET ORAL at 21:28

## 2021-12-20 RX ADMIN — POLYETHYLENE GLYCOL 3350 17 GRAM(S): 17 POWDER, FOR SOLUTION ORAL at 21:28

## 2021-12-20 RX ADMIN — OXYCODONE HYDROCHLORIDE 10 MILLIGRAM(S): 5 TABLET ORAL at 05:22

## 2021-12-20 RX ADMIN — OXYCODONE HYDROCHLORIDE 10 MILLIGRAM(S): 5 TABLET ORAL at 01:13

## 2021-12-20 RX ADMIN — CARVEDILOL PHOSPHATE 12.5 MILLIGRAM(S): 80 CAPSULE, EXTENDED RELEASE ORAL at 21:28

## 2021-12-20 RX ADMIN — Medication 975 MILLIGRAM(S): at 05:22

## 2021-12-20 RX ADMIN — OXYCODONE HYDROCHLORIDE 10 MILLIGRAM(S): 5 TABLET ORAL at 05:52

## 2021-12-20 NOTE — PROGRESS NOTE ADULT - SUBJECTIVE AND OBJECTIVE BOX
Patient seen and examined at bedside. Pain well controlled with medication. Patient denies any numbness, tingling, weakness, or any other orthopaedic complaint.     Exam:   T(C): 36.6 (12-20-21 @ 18:45), Max: 36.8 (12-19-21 @ 20:11)  T(F): 97.9 (12-20-21 @ 18:45), Max: 98.2 (12-19-21 @ 20:11)  HR: 87 (12-20-21 @ 18:45) (65 - 100)  BP: 134/77 (12-20-21 @ 18:45) (94/53 - 142/87)  RR: 15 (12-20-21 @ 18:45) (12 - 24)  SpO2: 99% (12-20-21 @ 18:45) (90% - 99%)    Gen: resting comfortably in bed, NAD  RLE:   Mepilex dressing over right hip c/d/i, abduction pillow in place. Skin intact. No edema, erythema, or lesions of the skin. No visualized deformity.   Brit-incisional TTP; otherwise, NTTP throughout the rest of the extremity.   SILT L2-S1.   GSC/TA/EHL/FHL intact; Q/H unable to assess 2/2 pillow  DP pulse palpable.  No calf tenderness bilaterally.  Compartments soft and compressible.     Laboratory Results:   CBC, 12-20-21 @ 17:13    WBC: 9.40  Hgb: 12.8  Hct: 39.6  Plt: 148      Chemistry, 12-20-21 @ 17:13    Na: 138     AST: --  K: 4.7       ALT: --  Cl: 108      TProt: --  CO2: 25     Alb: --  BUN: 23     TBili: --  Cr: 0.91      AP: --  Glu: 168       Mg: --  P: --    eGFR: 88  eGFR, AA: 76        Imaging: Intraoperative plain film imaging demonstrates appropriate positioning of right hip hemiarthroplasty

## 2021-12-20 NOTE — DISCHARGE NOTE PROVIDER - NSDCCPCAREPLAN_GEN_ALL_CORE_FT
PRINCIPAL DISCHARGE DIAGNOSIS  Diagnosis: Hip fracture, right  Assessment and Plan of Treatment:       SECONDARY DISCHARGE DIAGNOSES  Diagnosis: Fall  Assessment and Plan of Treatment:      PRINCIPAL DISCHARGE DIAGNOSIS  Diagnosis: Hip fracture, right  Assessment and Plan of Treatment: WBAT with PT      SECONDARY DISCHARGE DIAGNOSES  Diagnosis: Fall  Assessment and Plan of Treatment:

## 2021-12-20 NOTE — PROGRESS NOTE ADULT - SUBJECTIVE AND OBJECTIVE BOX
HPI:  Patient is a 87y old male who presents with a chief complaint of right hip pain s/p fall.    Consulted by Dr. Grider for VTE prophylaxis, risk stratification, and anticoagulation management.    PAST MEDICAL & SURGICAL HISTORY:  Afib  DVT (deep venous thrombosis)  Hyperlipemia  H/O cardiomyopathy  Factor V Leiden  History of appendectomy    Interval History:  : Patient seen at bedside denying any discomfort. Endorses that he takes 1 mg warfarin daily and INR monitored via [facility] and Dr. Loja Advised to eat greens today and tomorrow. When INR less that 2.0 will start UFH until OR : Patient seen at bedside, INR 2.1, possible surgery tomorrow, advised patient to have Vit K foods   : Patient seen at bedside, INR 1.94, pending surgery today  : Patient seen at bedside INR 2.1. Will order FP. He is pleasant and denying any pain. NPO for OR today.    BMI: 28.9    CrCl:63.3    Incision Time:  Procedure End Time:  Tourniquet Time:  EBL:    Caprini VTE Risk Score:   CAPRINI SCORE  AGE RELATED RISK FACTORS                                                       MOBILITY RELATED FACTORS  [ ] Age 41-60 years                                            (1 Point)                  [ ] Bed rest /restricted mobility                      (1 Point)  [ ] Age: 61-74 years                                           (2 Points)                [ ] Plaster cast                                                   (2 Points)  [x ] Age= 75 years                                              (3 Points)                 [ ] Bed bound for more than 72 hours                   (2 Points)    DISEASE RELATED RISK FACTORS                                               GENDER SPECIFIC FACTORS  [ ] Edema in the lower extremities                       (1 Point)           [ ] Pregnancy                                                            (1 Point)  [ ] Varicose veins                                               (1 Point)                  [ ] Post-partum < 6 weeks                                      (1 Point)             [ x] BMI > 25 Kg/m2                                            (1 Point)                  [ ] Hormonal therapy or oral contraception       (1 Point)                 [ ] Sepsis (in the previous month)                        (1 Point)             [ ] History of pregnancy complications                (1Point)  [ ] Pneumonia or serious lung disease                                             [ ] Unexplained or recurrent  (=/>3), premature                                 (In the previous month)                               (1 Point)                birth with toxemia or growth-restricted infant (1 Point)  [ ] Abnormal pulmonary function test            (1 Point)                                   SURGERY RELATED RISK FACTORS  [ ] Acute myocardial infarction                       (1 Point)                  [ ]  Section                                         (1 Point)  [ ] Congestive heart failure (in the previous month) (1 Point)   [ ] Minor surgery   lasting <45 minutes       (1 Point)   [ ] Inflammatory bowel disease                             (1 Point)          [ ] Arthroscopic surgery                                  (2 Points)  [ ] Central venous access                                    (2 Points)            [ ] General surgery lasting >45 minutes      (2 Points)       [ ] Stroke (in the previous month)                  (5 Points)            [ ] Elective major lower extremity arthroplasty (5 Points)                                   [  ] Malignancy (present or past include skin melanoma                                          but exclude  basal skin cell)    (2 points)                                      TRAUMA RELATED RISK FACTORS                HEMATOLOGY RELATED FACTORS                                  [x ] Fracture of the hip, pelvis, or leg                       (5 Points)  [x ] Prior episodes of VTE                                     (3 Points)          [ ] Acute spinal cord injury (in the previous month)  (5 Points)  [ ] Positive family history for VTE                         (3 Points)       [ ] Paralysis (less than 1 month)                          (5 Points)  [ ] Prothrombin 58291 A                                      (3 Points)         [ ] Multiple Trauma (within 1month)                 (5Points)                                                                                                                                                                [ ] Factor V Leiden                                          (3 Points)                                OTHER RISK FACTORS                          [ ] Lupus anticoagulants                                     (3 Points)                     [ ] BMI > 40                          (1 Point)                                                         [ ] Anticardiolipin antibodies                                (3 Points)                 [ ] Smoking                              (1Point)                                                [ ] High homocysteine in the blood                      (3 Points)                [  ] Diabetes requiring insulin (1point)                         [ ] Other congenital or acquired thrombophilia       (3 Points)          [  ] Chemotherapy                   (1 Point)  [ ] Heparin induced thrombocytopenia                  (3 Points)             [  ] Blood Transfusion                (1 point)                                                                                                             Total Score [     12     ]                                                                                                                                                                                                                                                                                                                                                                                                                          [id number] Score: #4    IMPROVE Bleeding Risk Score:4.5      Falls Risk:   High ( x )  Mod (  )  Low (  )      FAMILY HISTORY:  FH: diabetes mellitus    FH: breast cancer      Denies any personal or familial history of clotting or bleeding disorders.    Allergies    penicillin (Unknown)    Intolerances        REVIEW OF SYSTEMS    (  )Fever	        (  )Constipation	(  )SOB				  (  )Headache   (  )Dysuria  (  )Chills	        (  )Melena	        (  )Dyspnea on exertion (  )Dizziness    (  )Polyuria  (  )Nausea      (  )Hematochezia	(  )Cough                          (  )Syncope      (  )Hematuria  (  )Vomiting   (  )Chest Pain	        (  )Wheezing			  (  )Weakness  (  )Diarrhea    (  )Palpitations	(  )Anorexia			  (  )Myalgia       (  x ) Arthralgia    Pertinent positives in HPI and daily subjective. All other systems negative.    Vital Signs Last 24 Hrs  T(C): 36.8 (12-21 @ 08:37), Max: 36.8 (12-19-21 @ 20:11)  T(F): 98.2 (21 @ 08:37), Max: 98.2 (21 @ 20:11)  HR: 100 (21 @ 08:37) (90 - 100)  BP: 137/88 (21 @ 08:37) (120/84 - 142/87)  BP(mean): --  RR: 18 (21 @ 08:37) (17 - 18)  SpO2: 95% (21 @ 08:37) (94% - 95%)    PHYSICAL EXAM:    Constitutional: Appears Well    Neurological: A& O x 3    Skin: Warm    Respiratory and Thorax: normal effort; Breath sounds: normal; No rales/wheezing/rhonchi  	  Cardiovascular: S1, S2, regular, NMBR	    Gastrointestinal: BS + x 4Q, nontender	    Genitourinary:  Bladder nondistended, nontender    Musculoskeletal:   General Right:   no muscle/joint tenderness,   normal tone, no joint swelling,   ROM: limited	    General Left:   no muscle/joint tenderness,   normal tone, no joint swelling,   ROM: limited/full    Hip:  Right:     Lower extrems:   Right: no calf tenderness              negative olivia's sign               + pedal pulses    Left:   no calf tenderness              negative olivia's sign               + pedal pulses                          13.2   9.94  )-----------( 139      ( 20 Dec 2021 07:19 )             40.8           138  |  109<H>  |  25<H>  ----------------------------<  156<H>  4.4   |  24  |  0.92    Ca    8.3<L>      20 Dec 2021 07:19        PT/INR - ( 20 Dec 2021 07:19 )   PT: 23.9 sec;   INR: 2.14 ratio         PTT - ( 20 Dec 2021 07:19 )  PTT:35.4 sec                           13.3   10.15 )-----------( 126      ( 19 Dec 2021 07:20 )             40.6           139  |  108  |  25<H>  ----------------------------<  147<H>  4.4   |  27  |  1.02    Ca    8.6      19 Dec 2021 07:20    TPro  x   /  Alb  3.1<L>  /  TBili  x   /  DBili  x   /  AST  x   /  ALT  x   /  AlkPhos  x   12-18      PT/INR - ( 19 Dec 2021 07:20 )   PT: 22.0 sec;   INR: 1.94 ratio         PTT - ( 19 Dec 2021 07:20 )  PTT:35.1 sec                    13.9   13.14 )-----------( 132      ( 18 Dec 2021 07:23 )             42.3       12    137  |  106  |  28<H>  ----------------------------<  176<H>  4.3   |  25  |  1.13    Ca    8.6      18 Dec 2021 07:23    TPro  x   /  Alb  3.1<L>  /  TBili  x   /  DBili  x   /  AST  x   /  ALT  x   /  AlkPhos  x       PT/INR - ( 20 Dec 2021 07:19 )   PT: 23.9 sec;   INR: 2.14 ratio    PT/INR - ( 19 Dec 2021 07:20 )   PT: 22.0 sec;   INR: 1.94 ratio    PT/INR - ( 18 Dec 2021 07:23 )   PT: 23.7 sec;   INR: 2.12 ratio    PT/INR - ( 17 Dec 2021 07:59 )   PT: 27.5 sec;   INR: 2.45 ratio      MEDICATIONS  (STANDING):  acetaminophen     Tablet .. 975 milliGRAM(s) Oral every 8 hours  aspirin enteric coated 81 milliGRAM(s) Oral daily  atorvastatin 40 milliGRAM(s) Oral at bedtime  carvedilol 12.5 milliGRAM(s) Oral every 12 hours  digoxin     Tablet 125 MICROGram(s) Oral daily  enalapril 5 milliGRAM(s) Oral daily  multivitamin 1 Tablet(s) Oral daily  senna 2 Tablet(s) Oral at bedtime  sodium chloride 0.9%. 1000 milliLiter(s) IV Continuous <Continuous>  tranexamic acid IVPB 2000 milliGRAM(s) IV Intermittent once      **Current DVT Prophylaxis:    LMWH                   (  )  Heparin SQ           (  )  Coumadin             (HOLD  )  Xarelto                  (  )  Eliquis                   (  )  Venodynes           (  x)  Ambulation          ( post-op )  UFH                       ( when INR  < 2.0)  ECASA                   (  )  Contraindicated  (  )

## 2021-12-20 NOTE — PROGRESS NOTE ADULT - SUBJECTIVE AND OBJECTIVE BOX
Patient seen and examined at bedside. No acute overnight complaints. Patient is aware and agreeable to the OR plan, pending INR this AM. Denies fever, chills, headaches, chest pain, shortness of breath, nausea vomiting, or new orthopaedic complaints.     Vital Signs Last 24 Hrs  T(C): 36.7 (20 Dec 2021 00:25), Max: 36.8 (19 Dec 2021 20:11)  T(F): 98 (20 Dec 2021 00:25), Max: 98.2 (19 Dec 2021 20:11)  HR: 90 (20 Dec 2021 00:25) (90 - 90)  BP: 142/87 (20 Dec 2021 00:25) (120/84 - 142/87)  BP(mean): --  RR: 18 (20 Dec 2021 00:25) (17 - 18)  SpO2: 94% (20 Dec 2021 00:25) (94% - 94%)    Labs:                          13.3   10.15 )-----------( 126      ( 19 Dec 2021 07:20 )             40.6   12-19    139  |  108  |  25<H>  ----------------------------<  147<H>  4.4   |  27  |  1.02    Ca    8.6      19 Dec 2021 07:20    TPro  x   /  Alb  3.1<L>  /  TBili  x   /  DBili  x   /  AST  x   /  ALT  x   /  AlkPhos  x   12-18    PT/INR - ( 19 Dec 2021 15:07 )   PT: 23.4 sec;   INR: 2.07 ratio         PTT - ( 19 Dec 2021 07:20 )  PTT:35.1 sec+SILT L3-S1  + PT      Gen: NAD, Resting comfortably  RLE  Extremity shortened and externally rotated. Skin intact. No edema, erythema, or lesions of the skin. No visualized deformity.   TTP about the hip. No bony tenderness to palpation  SILT L2-S1  +EHL/FHL/TA/GSC; Q/H unable to assess 2/2 pain.   DP pulse palpable.  No calf tenderness bilaterally.  Compartments soft and compressible.

## 2021-12-20 NOTE — DISCHARGE NOTE PROVIDER - HOSPITAL COURSE
Orthopedic Summary  H&P:  Pt is a 87y Male      Now s/p RIGHT Hip Hemiarthroplasty for fracture. Pt is afebrile with stable vital signs. Pain is controlled. Exam reveals intact EHL FHL TA GS, +DP. Dressing is clean and dry with a new bandage on.    Hospital Course:  Patient presented to [facility] ED after a fall, found to have a hip fracture, and admitted to the Medical Service. Pt was  medically/cardiac cleared prior to surgery. Prophylactic antibiotics were started before the procedure and continued for 24 hours. They were admitted after surgery to the orthopedic floor.  There were no orthopedic complications during the hospital stay. All home medications were continued.    Routine consults were obtained from the Anticoagulation Team for DVT/PE prophylaxis, from Physical Therapy, and followed by Medicine for Co-management. Patient was placed on  anticoagulation.  Pertinent home medications were continued.  Daily labs were followed.      On POD 0 there were no major issues. Pt received PT daily and was Discharged once cleared per Medicine.  The orthopedic Attending is aware and agrees. See addendum to DC summary per medical team below for any additional info or if any changes.

## 2021-12-20 NOTE — PROGRESS NOTE ADULT - SUBJECTIVE AND OBJECTIVE BOX
Patient seen and examined in PACU  Complaining of right hip pain.    VS:  /77, puls 87, puls Ox 98%    PE:  Dressing D/C/I  NVI RLE  Compartments soft all extremities.  FHL/EHL/TA/GS intact B/L LE

## 2021-12-20 NOTE — DISCHARGE NOTE PROVIDER - NSDCMRMEDTOKEN_GEN_ALL_CORE_FT
Aspirin Enteric Coated 81 mg oral delayed release tablet: 1 tab(s) orally once a day  carvedilol 12.5 mg oral tablet: 1 tab(s) orally 2 times a day  digoxin 125 mcg (0.125 mg) oral tablet: 1 tab(s) orally once a day  enalapril 5 mg oral tablet: 1 tab(s) orally once a day  Multiple Vitamins oral tablet: 1 tab(s) orally once a day  [email or web address] COVID-19 Vaccine PF 30 mcg/0.3 mL intramuscular suspension: 0.3 milliliter(s) intramuscular once  ***3rd dose 11/23***  rosuvastatin 10 mg oral tablet: 1 tab(s) orally once a day  Super B Complex oral tablet: 1 tab(s) orally once a day  warfarin 1 mg oral tablet: 1 tab(s) orally once a day   acetaminophen 325 mg oral tablet: 2 tab(s) orally every 6 hours, As Needed for mild pain  ascorbic acid 500 mg oral tablet: 1 tab(s) orally 2 times a day  Aspirin Enteric Coated 81 mg oral delayed release tablet: 1 tab(s) orally once a day  carvedilol 12.5 mg oral tablet: 1 tab(s) orally 2 times a day  digoxin 125 mcg (0.125 mg) oral tablet: 1 tab(s) orally once a day  enalapril 5 mg oral tablet: 1 tab(s) orally once a day  folic acid 1 mg oral tablet: 1 tab(s) orally once a day  Multiple Vitamins oral tablet: 1 tab(s) orally once a day  oxyCODONE 10 mg oral tablet: 1 tab(s) orally every 4 hours, As Needed for severe pain  oxyCODONE 5 mg oral tablet: 1 tab(s) orally every 4 hours, As Needed  for moderate pain  pantoprazole 40 mg oral delayed release tablet: 1 tab(s) orally once a day (before a meal)  polyethylene glycol 3350 oral powder for reconstitution: 17 gram(s) orally once a day (at bedtime)  rosuvastatin 10 mg oral tablet: 1 tab(s) orally once a day  senna oral tablet: 2 tab(s) orally once a day (at bedtime)  warfarin 1 mg oral tablet: 1 tab(s) orally once a day (at bedtime), monitor INR at rehab

## 2021-12-20 NOTE — DISCHARGE NOTE PROVIDER - CARE PROVIDER_API CALL
Gustavo Michaels (DO)  Orthopaedic Surgery  [address] [place] [address]  Phone: [phone]  Fax: [phone]  Follow Up Time:

## 2021-12-20 NOTE — DISCHARGE NOTE PROVIDER - NSDCFUADDINST_GEN_ALL_CORE_FT
Discharge Instructions for Hip Arthroplasty    1. Pain medication will be sent electronically to your pharmacy - take these as needed.     2. Follow up with Dr. Michaels in office in 2 weeks. Please call the office number to make an appointment, or if you have any questions.     3. Call with fevers over 101F; wound redness; drainage or opened incisions; or calf pain/swelling.    4. Resume your previous diet.     5. You may walking with full weight bearing as tolerated; use your assistive devices (walker/cane) as needed. Walk plenty. Continue to use your pink pillow to keep your legs apart, especially while sitting or lying down - this will help prevent hip dislocations.     6. Follow your physical therapy protocol.     7. Keep your dressing clean and dry. Change the dressing every 7 days or if it becomes visibly soiled.     8. It is ok to shower with an Aquacel bandage only.  Avoid direct water beating on bandage. Otherwise, if you have any other kind of dressing, do not shower and keep your dressing clean and dry.     9. Do not take a bath/soak/hot tub, and do not submerge your dressing.     10. Continue to place ice packs on hip.   Discharge Instructions for Hip Arthroplasty    1. Pain medication will be sent electronically to your pharmacy - take these as needed.     2. Follow up with Dr. Michaels in office in 2 weeks. Please call the office number to make an appointment, or if you have any questions.     3. Call with fevers over 101F; wound redness; drainage or opened incisions; or calf pain/swelling.    4. Resume your previous diet.     5. You may walking with full weight bearing as tolerated; use your assistive devices (walker) as needed. Walk plenty. Continue to use your pink pillow to keep your legs apart, especially while sitting or lying down - this will help prevent hip dislocations.     6. Follow your physical therapy protocol.     7. Keep your dressing clean and dry. Change the dressing every 7 days or if it becomes visibly soiled.     8. It is ok to shower with an Aquacel bandage only.  Avoid direct water beating on bandage. Otherwise, if you have any other kind of dressing, do not shower and keep your dressing clean and dry.     9. Do not take a bath/soak/hot tub, and do not submerge your dressing.     10. Continue to place ice packs on hip.   Discharge Instructions for Hip Hemiarthroplasty    1. PAIN CONTROL: See Med Rec.  2. ACTIVITY: Weight Bearing as Tolerated with assistance and rolling walker. Posterior Hip Precautions. Abduction pillow while in bed or chair for 6 weeks.  3. PT: daily, Posterior Hip Precautions.  4. DVT/PE PROPHYLAXIS: Continue DVT/PE Prophylaxis. See Med Rec for Duration and dose.  5. BANDAGE: Change dressing to a new Mepilex Ag bandage POD7. May change sooner if dressing saturated or falling off. DO NOT REMOVE BANDAGE TO CHECK WOUND ON INTAKE.  6. STAPLES: RN Remove Staples POD14 (1/3).  7. SHOWER: Okay to shower. Do not soak, submerge or let shower stream beat on dressing/wound.  8. FOLLOW UP: Follow-up with Orthopedic Surgeon Dr. Michaels in 14 Days. Call Office For Appointment.

## 2021-12-20 NOTE — PROGRESS NOTE ADULT - SUBJECTIVE AND OBJECTIVE BOX
CC-  S/p mechanical fall     HPI:  86yo/M with PMH afib chronic, h/o DVT x20 years ago, factor V leiden deficiency on chronic Coumadin therapy, CAD, hyperlipidemia, sleep apnea presented s/p mechanical fall. Patient tripped over the curb and fell onto his RT side sustaining RT hip fracture. Denies LOC. NO head trauma.     Pt seen and examined this am. No n/v/d. NPO for OR. no sob/chest pain.     Review of system- All 10 systems reviewed and is as per HPI otherwise negative.     Vital Signs Last 24 Hrs  T(C): 36.8 (20 Dec 2021 08:37), Max: 36.8 (19 Dec 2021 20:11)  T(F): 98.2 (20 Dec 2021 08:37), Max: 98.2 (19 Dec 2021 20:11)  HR: 100 (20 Dec 2021 08:37) (90 - 100)  BP: 137/88 (20 Dec 2021 08:37) (120/84 - 142/87)  RR: 18 (20 Dec 2021 08:37) (17 - 18)  SpO2: 95% (20 Dec 2021 08:37) (94% - 95%)    PHYSICAL EXAM:    GENERAL: Comfortable, no acute distress   HEAD:  Normocephalic, atraumatic  EYES: EOMI, PERRLA  HEENT: dry mucous membranes  NECK: Supple, No JVD  NERVOUS SYSTEM:  Alert & Oriented X3, non focal  CHEST/LUNG: Clear to auscultation bilaterally  HEART: Regular rate and rhythm  ABDOMEN: Soft, Nontender, Nondistended, Bowel sounds present  GENITOURINARY: Voiding, no palpable bladder  EXTREMITIES:   No clubbing, cyanosis, or edema  MUSCULOSKELTAL-right hip pain with movement.   SKIN-no rash    LABS:                        13.2   9.94  )-----------( 139      ( 20 Dec 2021 07:19 )             40.8     12-20    138  |  109<H>  |  25<H>  ----------------------------<  156<H>  4.4   |  24  |  0.92    Ca    8.3<L>      20 Dec 2021 07:19      PT/INR - ( 20 Dec 2021 07:19 )   PT: 23.9 sec;   INR: 2.14 ratio         PTT - ( 20 Dec 2021 07:19 )  PTT:35.4 sec      RADIOLOGY & ADDITIONAL TESTS:  ACC: [id number] EXAM:  XR FEMUR 2 VIEWS RT                        ACC: [id number] EXAM:  XR HIP WITH PELV 2-3V RT                          PROCEDURE DATE:  12/16/2021          INTERPRETATION:  Right hip pain following fall.    3 views of the right hip including pelvis, and 2 views of the right femur.    There is a mildly displaced subcapital femoral neck fracture with   external rotation of the hip.  The pelvic ring appears intact.    Soft tissue vascular calcifications are present.    IMPRESSION:    Findings as discussed above.     XR CHEST PORTABLE URGENT 1V                          PROCEDURE DATE:  12/16/2021          INTERPRETATION:  Chest portable.    Clinical History: Fall, right hip fracture.    Comparison: 2/13/2020.    Single AP view submitted.    Overlying metallic external artifact.    The evaluation of the cardiomediastinal silhouette is limited on portable   technique.  Mildly tortuous, calcified aorta.    There is mild elevation of the right hemidiaphragm.  There is prominence of the bronchovascular markings at the bilateral   perihilar and lower lung zones, without lobar lung consolidation or   significant pleural effusion.    The evaluation for acute, nondisplaced rib fractures is limited on this   exam.    Impression:    Findings as discussed above.    MEDICATIONS  (STANDING):  acetaminophen     Tablet .. 975 milliGRAM(s) Oral every 8 hours  aspirin enteric coated 81 milliGRAM(s) Oral daily  atorvastatin 40 milliGRAM(s) Oral at bedtime  carvedilol 12.5 milliGRAM(s) Oral every 12 hours  digoxin     Tablet 125 MICROGram(s) Oral daily  enalapril 5 milliGRAM(s) Oral daily  multivitamin 1 Tablet(s) Oral daily  senna 2 Tablet(s) Oral at bedtime  sodium chloride 0.9%. 1000 milliLiter(s) (50 mL/Hr) IV Continuous <Continuous>  tranexamic acid IVPB 2000 milliGRAM(s) IV Intermittent once    MEDICATIONS  (PRN):  oxyCODONE    IR 5 milliGRAM(s) Oral every 4 hours PRN Moderate Pain (4 - 6)  oxyCODONE    IR 10 milliGRAM(s) Oral every 4 hours PRN Severe Pain (7 - 10)      Assessment/Plan  #S/p mechanical fall with RT hip fracture  -bed rest  -pain control   -incentive spirometry  -coumadin on hold.   -INR 1.94 today.   -OR rescheduled for tomorrow per ortho given INR  -being followed by a/c services.   -NO MEDICAL CONTRAINDICATIONS FOR OR ONCE INR ACCEPTABLE    #Afib chronic on coumadin  #H/o DVT/ factor V leiden  Hold coumadin for OR  will d/w a/c services re: anticoagulation     #CAD non-obstructive  Cont aspirin, statin  Last ECHO- EF 55%, mild AS    #DVT proph  -inr 1.96, left message for a/c services.        CC-  S/p mechanical fall     HPI:  86yo/M with PMH afib chronic, h/o DVT x20 years ago, factor V leiden deficiency on chronic Coumadin therapy, CAD, hyperlipidemia, sleep apnea presented s/p mechanical fall. Patient tripped over the curb and fell onto his RT side sustaining RT hip fracture. Denies LOC. NO head trauma.     Pt seen and examined this am. No n/v/d. NPO for OR. no sob/chest pain.     Review of system- All 10 systems reviewed and is as per HPI otherwise negative.     Vital Signs Last 24 Hrs  T(C): 36.8 (20 Dec 2021 08:37), Max: 36.8 (19 Dec 2021 20:11)  T(F): 98.2 (20 Dec 2021 08:37), Max: 98.2 (19 Dec 2021 20:11)  HR: 100 (20 Dec 2021 08:37) (90 - 100)  BP: 137/88 (20 Dec 2021 08:37) (120/84 - 142/87)  RR: 18 (20 Dec 2021 08:37) (17 - 18)  SpO2: 95% (20 Dec 2021 08:37) (94% - 95%)    PHYSICAL EXAM:    GENERAL: Comfortable, no acute distress   HEAD:  Normocephalic, atraumatic  EYES: EOMI, PERRLA  HEENT: dry mucous membranes  NECK: Supple, No JVD  NERVOUS SYSTEM:  Alert & Oriented X3, non focal  CHEST/LUNG: Clear to auscultation bilaterally  HEART: Regular rate and rhythm  ABDOMEN: Soft, Nontender, Nondistended, Bowel sounds present  GENITOURINARY: Voiding, no palpable bladder  EXTREMITIES:   No clubbing, cyanosis, or edema  MUSCULOSKELTAL-right hip pain with movement.   SKIN-no rash    LABS:                        13.2   9.94  )-----------( 139      ( 20 Dec 2021 07:19 )             40.8     12-20    138  |  109<H>  |  25<H>  ----------------------------<  156<H>  4.4   |  24  |  0.92    Ca    8.3<L>      20 Dec 2021 07:19      PT/INR - ( 20 Dec 2021 07:19 )   PT: 23.9 sec;   INR: 2.14 ratio         PTT - ( 20 Dec 2021 07:19 )  PTT:35.4 sec      RADIOLOGY & ADDITIONAL TESTS:  ACC: [id number] EXAM:  XR FEMUR 2 VIEWS RT                        ACC: [id number] EXAM:  XR HIP WITH PELV 2-3V RT                          PROCEDURE DATE:  12/16/2021          INTERPRETATION:  Right hip pain following fall.    3 views of the right hip including pelvis, and 2 views of the right femur.    There is a mildly displaced subcapital femoral neck fracture with   external rotation of the hip.  The pelvic ring appears intact.    Soft tissue vascular calcifications are present.    IMPRESSION:    Findings as discussed above.     XR CHEST PORTABLE URGENT 1V                          PROCEDURE DATE:  12/16/2021          INTERPRETATION:  Chest portable.    Clinical History: Fall, right hip fracture.    Comparison: 2/13/2020.    Single AP view submitted.    Overlying metallic external artifact.    The evaluation of the cardiomediastinal silhouette is limited on portable   technique.  Mildly tortuous, calcified aorta.    There is mild elevation of the right hemidiaphragm.  There is prominence of the bronchovascular markings at the bilateral   perihilar and lower lung zones, without lobar lung consolidation or   significant pleural effusion.    The evaluation for acute, nondisplaced rib fractures is limited on this   exam.    Impression:    Findings as discussed above.    MEDICATIONS  (STANDING):  acetaminophen     Tablet .. 975 milliGRAM(s) Oral every 8 hours  aspirin enteric coated 81 milliGRAM(s) Oral daily  atorvastatin 40 milliGRAM(s) Oral at bedtime  carvedilol 12.5 milliGRAM(s) Oral every 12 hours  digoxin     Tablet 125 MICROGram(s) Oral daily  enalapril 5 milliGRAM(s) Oral daily  multivitamin 1 Tablet(s) Oral daily  senna 2 Tablet(s) Oral at bedtime  sodium chloride 0.9%. 1000 milliLiter(s) (50 mL/Hr) IV Continuous <Continuous>  tranexamic acid IVPB 2000 milliGRAM(s) IV Intermittent once    MEDICATIONS  (PRN):  oxyCODONE    IR 5 milliGRAM(s) Oral every 4 hours PRN Moderate Pain (4 - 6)  oxyCODONE    IR 10 milliGRAM(s) Oral every 4 hours PRN Severe Pain (7 - 10)      Assessment/Plan  #S/p mechanical fall with RT hip fracture  -bed rest  -pain control   -incentive spirometry  -coumadin on hold.  -INR >2.  -d/w AC services, agree with [facility].   -OR today.  -NO MEDICAL CONTRAINDICATIONS FOR OR ONCE INR ACCEPTABLE    #Afib chronic on coumadin  #H/o DVT/ factor V leiden  Hold coumadin for OR  resume a/c post op    #CAD non-obstructive  Cont aspirin, statin  Last ECHO- EF 55%, mild AS    #DVT proph

## 2021-12-21 LAB
ANION GAP SERPL CALC-SCNC: 5 MMOL/L — SIGNIFICANT CHANGE UP (ref 5–17)
BUN SERPL-MCNC: 22 MG/DL — SIGNIFICANT CHANGE UP (ref 7–23)
CALCIUM SERPL-MCNC: 8.8 MG/DL — SIGNIFICANT CHANGE UP (ref 8.5–10.1)
CHLORIDE SERPL-SCNC: 108 MMOL/L — SIGNIFICANT CHANGE UP (ref 96–108)
CO2 SERPL-SCNC: 25 MMOL/L — SIGNIFICANT CHANGE UP (ref 22–31)
CREAT SERPL-MCNC: 0.95 MG/DL — SIGNIFICANT CHANGE UP (ref 0.5–1.3)
GLUCOSE SERPL-MCNC: 203 MG/DL — HIGH (ref 70–99)
HCT VFR BLD CALC: 37.8 % — LOW (ref 39–50)
HGB BLD-MCNC: 12.4 G/DL — LOW (ref 13–17)
INR BLD: 2.5 RATIO — HIGH (ref 0.88–1.16)
MCHC RBC-ENTMCNC: 32.8 GM/DL — SIGNIFICANT CHANGE UP (ref 32–36)
MCHC RBC-ENTMCNC: 33 PG — SIGNIFICANT CHANGE UP (ref 27–34)
MCV RBC AUTO: 100.5 FL — HIGH (ref 80–100)
PLATELET # BLD AUTO: 148 K/UL — LOW (ref 150–400)
POTASSIUM SERPL-MCNC: 5 MMOL/L — SIGNIFICANT CHANGE UP (ref 3.5–5.3)
POTASSIUM SERPL-SCNC: 5 MMOL/L — SIGNIFICANT CHANGE UP (ref 3.5–5.3)
PROTHROM AB SERPL-ACNC: 28 SEC — HIGH (ref 10.6–13.6)
RBC # BLD: 3.76 M/UL — LOW (ref 4.2–5.8)
RBC # FLD: 12.8 % — SIGNIFICANT CHANGE UP (ref 10.3–14.5)
SODIUM SERPL-SCNC: 138 MMOL/L — SIGNIFICANT CHANGE UP (ref 135–145)
WBC # BLD: 10.75 K/UL — HIGH (ref 3.8–10.5)
WBC # FLD AUTO: 10.75 K/UL — HIGH (ref 3.8–10.5)

## 2021-12-21 PROCEDURE — 99231 SBSQ HOSP IP/OBS SF/LOW 25: CPT

## 2021-12-21 PROCEDURE — 99232 SBSQ HOSP IP/OBS MODERATE 35: CPT

## 2021-12-21 RX ORDER — WARFARIN SODIUM 2.5 MG/1
1 TABLET ORAL DAILY
Refills: 0 | Status: DISCONTINUED | OUTPATIENT
Start: 2021-12-21 | End: 2021-12-21

## 2021-12-21 RX ORDER — WARFARIN SODIUM 2.5 MG/1
0.5 TABLET ORAL DAILY
Refills: 0 | Status: DISCONTINUED | OUTPATIENT
Start: 2021-12-21 | End: 2021-12-23

## 2021-12-21 RX ORDER — POLYETHYLENE GLYCOL 3350 17 G/17G
17 POWDER, FOR SOLUTION ORAL DAILY
Refills: 0 | Status: DISCONTINUED | OUTPATIENT
Start: 2021-12-21 | End: 2021-12-23

## 2021-12-21 RX ORDER — ENOXAPARIN SODIUM 100 MG/ML
100 INJECTION SUBCUTANEOUS EVERY 12 HOURS
Refills: 0 | Status: DISCONTINUED | OUTPATIENT
Start: 2021-12-21 | End: 2021-12-21

## 2021-12-21 RX ADMIN — Medication 650 MILLIGRAM(S): at 11:11

## 2021-12-21 RX ADMIN — BENZOCAINE AND MENTHOL 1 LOZENGE: 5; 1 LIQUID ORAL at 20:00

## 2021-12-21 RX ADMIN — Medication 650 MILLIGRAM(S): at 11:12

## 2021-12-21 RX ADMIN — Medication 100 MILLIGRAM(S): at 05:54

## 2021-12-21 RX ADMIN — Medication 81 MILLIGRAM(S): at 11:09

## 2021-12-21 RX ADMIN — OXYCODONE HYDROCHLORIDE 10 MILLIGRAM(S): 5 TABLET ORAL at 11:10

## 2021-12-21 RX ADMIN — POLYETHYLENE GLYCOL 3350 17 GRAM(S): 17 POWDER, FOR SOLUTION ORAL at 21:16

## 2021-12-21 RX ADMIN — Medication 1 TABLET(S): at 11:09

## 2021-12-21 RX ADMIN — BENZOCAINE AND MENTHOL 1 LOZENGE: 5; 1 LIQUID ORAL at 06:45

## 2021-12-21 RX ADMIN — Medication 650 MILLIGRAM(S): at 18:27

## 2021-12-21 RX ADMIN — ATORVASTATIN CALCIUM 40 MILLIGRAM(S): 80 TABLET, FILM COATED ORAL at 21:14

## 2021-12-21 RX ADMIN — CARVEDILOL PHOSPHATE 12.5 MILLIGRAM(S): 80 CAPSULE, EXTENDED RELEASE ORAL at 21:14

## 2021-12-21 RX ADMIN — CARVEDILOL PHOSPHATE 12.5 MILLIGRAM(S): 80 CAPSULE, EXTENDED RELEASE ORAL at 11:09

## 2021-12-21 RX ADMIN — POLYETHYLENE GLYCOL 3350 17 GRAM(S): 17 POWDER, FOR SOLUTION ORAL at 11:09

## 2021-12-21 RX ADMIN — Medication 5 MILLIGRAM(S): at 11:12

## 2021-12-21 RX ADMIN — Medication 650 MILLIGRAM(S): at 05:56

## 2021-12-21 RX ADMIN — OXYCODONE HYDROCHLORIDE 10 MILLIGRAM(S): 5 TABLET ORAL at 21:17

## 2021-12-21 RX ADMIN — Medication 500 MILLIGRAM(S): at 05:54

## 2021-12-21 RX ADMIN — WARFARIN SODIUM 0.5 MILLIGRAM(S): 2.5 TABLET ORAL at 21:14

## 2021-12-21 RX ADMIN — OXYCODONE HYDROCHLORIDE 10 MILLIGRAM(S): 5 TABLET ORAL at 05:55

## 2021-12-21 RX ADMIN — HEPARIN SODIUM 5000 UNIT(S): 5000 INJECTION INTRAVENOUS; SUBCUTANEOUS at 06:00

## 2021-12-21 RX ADMIN — Medication 125 MICROGRAM(S): at 11:10

## 2021-12-21 RX ADMIN — OXYCODONE HYDROCHLORIDE 10 MILLIGRAM(S): 5 TABLET ORAL at 12:05

## 2021-12-21 RX ADMIN — PANTOPRAZOLE SODIUM 40 MILLIGRAM(S): 20 TABLET, DELAYED RELEASE ORAL at 05:56

## 2021-12-21 RX ADMIN — Medication 500 MILLIGRAM(S): at 18:27

## 2021-12-21 RX ADMIN — Medication 650 MILLIGRAM(S): at 05:54

## 2021-12-21 RX ADMIN — OXYCODONE HYDROCHLORIDE 10 MILLIGRAM(S): 5 TABLET ORAL at 06:25

## 2021-12-21 RX ADMIN — Medication 1 MILLIGRAM(S): at 11:09

## 2021-12-21 RX ADMIN — SENNA PLUS 2 TABLET(S): 8.6 TABLET ORAL at 21:16

## 2021-12-21 NOTE — PHYSICAL THERAPY INITIAL EVALUATION ADULT - MODALITIES TREATMENT COMMENTS
Pt OOB in high hip chair after session, +ABD pillow, +alarm, /89 after session in chair, +SCD's, +IV, pt eating breakfast, dizziness subsided, pain at rest 0/10 at start of PT 5/10 during ambulation and transfers, and 4/10 after session +ICe R hip. TX yuliana well. RN aware, CM aware.

## 2021-12-21 NOTE — PROGRESS NOTE ADULT - SUBJECTIVE AND OBJECTIVE BOX
HPI:  Patient is a 87y old male who presents with a chief complaint of right hip pain s/p fall.    Consulted by Dr. Grider for VTE prophylaxis, risk stratification, and anticoagulation management.    PAST MEDICAL & SURGICAL HISTORY:  Afib  DVT (deep venous thrombosis)  Hyperlipemia  H/O cardiomyopathy  Factor V Leiden  History of appendectomy    Interval History:  : Patient seen at bedside denying any discomfort. Endorses that he takes 1 mg warfarin daily and INR monitored via [facility] and Dr. Loja Advised to eat greens today and tomorrow. When INR less that 2.0 will start UFH until OR : Patient seen at bedside, INR 2.1, possible surgery tomorrow, advised patient to have Vit K foods   : Patient seen at bedside, INR 1.94, pending surgery today  : Patient seen at bedside INR 2.1. Will order FP. He is pleasant and denying any pain. NPO for OR today.  2021 Pt seen at bedside on [address].  Discussed his anticoagulation with coumadin.  Pt sates he has been on it for more then 20years and his normal dose is 1mg daily. Discussed with him his INR of 2.5 and giving him only .5mg tonight.  Will reevaluate tomorrow.    BMI: 28.9    CrCl:63.3    Incision Time:  Procedure End Time:  Tourniquet Time:  EBL:    Caprini VTE Risk Score:   CAPRINI SCORE  AGE RELATED RISK FACTORS                                                       MOBILITY RELATED FACTORS  [ ] Age 41-60 years                                            (1 Point)                  [ ] Bed rest /restricted mobility                      (1 Point)  [ ] Age: 61-74 years                                           (2 Points)                [ ] Plaster cast                                                   (2 Points)  [x ] Age= 75 years                                              (3 Points)                 [ ] Bed bound for more than 72 hours                   (2 Points)    DISEASE RELATED RISK FACTORS                                               GENDER SPECIFIC FACTORS  [ ] Edema in the lower extremities                       (1 Point)           [ ] Pregnancy                                                            (1 Point)  [ ] Varicose veins                                               (1 Point)                  [ ] Post-partum < 6 weeks                                      (1 Point)             [ x] BMI > 25 Kg/m2                                            (1 Point)                  [ ] Hormonal therapy or oral contraception       (1 Point)                 [ ] Sepsis (in the previous month)                        (1 Point)             [ ] History of pregnancy complications                (1Point)  [ ] Pneumonia or serious lung disease                                             [ ] Unexplained or recurrent  (=/>3), premature                                 (In the previous month)                               (1 Point)                birth with toxemia or growth-restricted infant (1 Point)  [ ] Abnormal pulmonary function test            (1 Point)                                   SURGERY RELATED RISK FACTORS  [ ] Acute myocardial infarction                       (1 Point)                  [ ]  Section                                         (1 Point)  [ ] Congestive heart failure (in the previous month) (1 Point)   [ ] Minor surgery   lasting <45 minutes       (1 Point)   [ ] Inflammatory bowel disease                             (1 Point)          [ ] Arthroscopic surgery                                  (2 Points)  [ ] Central venous access                                    (2 Points)            [ ] General surgery lasting >45 minutes      (2 Points)       [ ] Stroke (in the previous month)                  (5 Points)            [ ] Elective major lower extremity arthroplasty (5 Points)                                   [  ] Malignancy (present or past include skin melanoma                                          but exclude  basal skin cell)    (2 points)                                      TRAUMA RELATED RISK FACTORS                HEMATOLOGY RELATED FACTORS                                  [x ] Fracture of the hip, pelvis, or leg                       (5 Points)  [x ] Prior episodes of VTE                                     (3 Points)          [ ] Acute spinal cord injury (in the previous month)  (5 Points)  [ ] Positive family history for VTE                         (3 Points)       [ ] Paralysis (less than 1 month)                          (5 Points)  [ ] Prothrombin 69001 A                                      (3 Points)         [ ] Multiple Trauma (within 1month)                 (5Points)                                                                                                                                                                [ ] Factor V Leiden                                          (3 Points)                                OTHER RISK FACTORS                          [ ] Lupus anticoagulants                                     (3 Points)                     [ ] BMI > 40                          (1 Point)                                                         [ ] Anticardiolipin antibodies                                (3 Points)                 [ ] Smoking                              (1Point)                                                [ ] High homocysteine in the blood                      (3 Points)                [  ] Diabetes requiring insulin (1point)                         [ ] Other congenital or acquired thrombophilia       (3 Points)          [  ] Chemotherapy                   (1 Point)  [ ] Heparin induced thrombocytopenia                  (3 Points)             [  ] Blood Transfusion                (1 point)                                                                                                             Total Score [     12     ]                                                                                                                                                                                                                                                                                                                                                                                                                          [id number] Score: #4    IMPROVE Bleeding Risk Score:4.5      Falls Risk:   High ( x )  Mod (  )  Low (  )      FAMILY HISTORY:  FH: diabetes mellitus    FH: breast cancer      Denies any personal or familial history of clotting or bleeding disorders.    Allergies    penicillin (Unknown)    Intolerances        REVIEW OF SYSTEMS    (  )Fever	        (  )Constipation	(  )SOB				  (  )Headache   (  )Dysuria  (  )Chills	        (  )Melena	        (  )Dyspnea on exertion (  )Dizziness    (  )Polyuria  (  )Nausea      (  )Hematochezia	(  )Cough                          (  )Syncope      (  )Hematuria  (  )Vomiting   (  )Chest Pain	        (  )Wheezing			  (  )Weakness  (  )Diarrhea    (  )Palpitations	(  )Anorexia			  (  )Myalgia       (  x ) Arthralgia    Pertinent positives in HPI and daily subjective. All other systems negative.    Vital Signs Last 24 Hrs  T(C): 36.4 (21 @ 08:55), Max: 36.7 (21 @ 16:55)  T(F): 97.5 (21 @ 08:55), Max: 98 (21 @ 16:55)  HR: 94 (21 @ 08:55) (65 - 94)  BP: 129/79 (21 @ 08:55) (94/53 - 138/86)  BP(mean): --  RR: 18 (21 @ 08:55) (12 - 24)  SpO2: 98% (21 @ 08:55) (90% - 99%)    PHYSICAL EXAM:    Constitutional: Appears Well    Neurological: A& O x 3    Skin: Warm    Respiratory and Thorax: normal effort; Breath sounds: normal; No rales/wheezing/rhonchi  	  Cardiovascular: S1, S2, regular, NMBR	    Gastrointestinal: BS + x 4Q, nontender	    Genitourinary:  Bladder nondistended, nontender    Musculoskeletal:   General Right:   no muscle/joint tenderness,   normal tone, no joint swelling,   ROM: limited	    General Left:   no muscle/joint tenderness,   normal tone, no joint swelling,   ROM: full    Hip:  Right:     Lower extrems:   Right: no calf tenderness              negative olivia's sign               + pedal pulses    Left:   no calf tenderness              negative olivia's sign               + pedal pulses                        12.4   10.75 )-----------( 148      ( 21 Dec 2021 07:56 )             37.8           138  |  108  |  22  ----------------------------<  203<H>  5.0   |  25  |  0.95    Ca    8.8      21 Dec 2021 07:56     PTT - ( 21 Dec 2021 09:49 )  PTT:35.7 sec                        13.2   9.94  )-----------( 139      ( 20 Dec 2021 07:19 )             40.8           138  |  109<H>  |  25<H>  ----------------------------<  156<H>  4.4   |  24  |  0.92    Ca    8.3<L>      20 Dec 2021 07:19        PT/INR - ( 20 Dec 2021 07:19 )   PT: 23.9 sec;   INR: 2.14 ratio         PTT - ( 20 Dec 2021 07:19 )  PTT:35.4 sec                           13.3   10.15 )-----------( 126      ( 19 Dec 2021 07:20 )             40.6           139  |  108  |  25<H>  ----------------------------<  147<H>  4.4   |  27  |  1.02    Ca    8.6      19 Dec 2021 07:20    TPro  x   /  Alb  3.1<L>  /  TBili  x   /  DBili  x   /  AST  x   /  ALT  x   /  AlkPhos  x   1218      PT/INR - ( 19 Dec 2021 07:20 )   PT: 22.0 sec;   INR: 1.94 ratio         PTT - ( 19 Dec 2021 07:20 )  PTT:35.1 sec                    13.9   13.14 )-----------( 132      ( 18 Dec 2021 07:23 )             42.3       12    137  |  106  |  28<H>  ----------------------------<  176<H>  4.3   |  25  |  1.13    Ca    8.6      18 Dec 2021 07:23    TPro  x   /  Alb  3.1<L>  /  TBili  x   /  DBili  x   /  AST  x   /  ALT  x   /  AlkPhos  x     PT/INR - ( 21 Dec 2021 09:49 )   PT: 28.0 sec;   INR: 2.50 ratio    PT/INR - ( 20 Dec 2021 07:19 )   PT: 23.9 sec;   INR: 2.14 ratio    PT/INR - ( 19 Dec 2021 07:20 )   PT: 22.0 sec;   INR: 1.94 ratio    PT/INR - ( 18 Dec 2021 07:23 )   PT: 23.7 sec;   INR: 2.12 ratio    PT/INR - ( 17 Dec 2021 07:59 )   PT: 27.5 sec;   INR: 2.45 ratio      MEDICATIONS  (STANDING):  acetaminophen     Tablet .. 650 milliGRAM(s) Oral every 6 hours  ascorbic acid 500 milliGRAM(s) Oral two times a day  aspirin enteric coated 81 milliGRAM(s) Oral daily  atorvastatin 40 milliGRAM(s) Oral at bedtime  carvedilol 12.5 milliGRAM(s) Oral every 12 hours  digoxin     Tablet 125 MICROGram(s) Oral daily  enalapril 5 milliGRAM(s) Oral daily  folic acid 1 milliGRAM(s) Oral daily  lactated ringers. 1000 milliLiter(s) IV Continuous <Continuous>  lactated ringers. 1000 milliLiter(s) IV Continuous <Continuous>  multivitamin 1 Tablet(s) Oral daily  pantoprazole    Tablet 40 milliGRAM(s) Oral before breakfast  polyethylene glycol 3350 17 Gram(s) Oral at bedtime  polyethylene glycol 3350 17 Gram(s) Oral daily  senna 2 Tablet(s) Oral at bedtime  tranexamic acid IVPB 2000 milliGRAM(s) IV Intermittent once  warfarin 0.5 milliGRAM(s) Oral daily        **Current DVT Prophylaxis:    LMWH                   (  )  Heparin SQ           (  )  Coumadin             (x  )  Xarelto                  (  )  Eliquis                   (  )  Venodynes           (  x)  Ambulation          ( x )  UFH                       ( )  ECASA                   (  )  Contraindicated  (  )

## 2021-12-21 NOTE — PROGRESS NOTE ADULT - SUBJECTIVE AND OBJECTIVE BOX
Patient seen and examined at bedside. Pain well controlled with medication. Patient denies any numbness, tingling, weakness, or any other orthopaedic complaint.     Exam:   T(C): 36.6 (12-21-21 @ 05:22), Max: 36.8 (12-20-21 @ 08:37)  T(F): 97.9 (12-21-21 @ 05:22), Max: 98.2 (12-20-21 @ 08:37)  HR: 77 (12-21-21 @ 05:22) (65 - 100)  BP: 115/72 (12-21-21 @ 05:22) (94/53 - 138/86)  RR: 16 (12-21-21 @ 05:22) (12 - 24)  SpO2: 96% (12-21-21 @ 05:22) (90% - 99%)    Gen: resting comfortably in bed, NAD  RLE:  Dressing in place over right hip, c/d/i without strikethrough. Skin intact. No edema, erythema, or lesions of the skin. No visualized deformity.   Brit-incisional TTP; otherwise, NTTP throughout the rest of the extremity.   SILT L2-S1  GSC/TA/EHL/FHL intact; Q/H unable to assess  pulse palpable.  No calf tenderness bilaterally.  Compartments soft and compressible.     Laboratory Results:   CBC, 12-20-21 @ 17:13    WBC: 9.40  Hgb: 12.8  Hct: 39.6  Plt: 148      Chemistry, 12-20-21 @ 17:13    Na: 138     AST: --  K: 4.7       ALT: --  Cl: 108      TProt: --  CO2: 25     Alb: --  BUN: 23     TBili: --  Cr: 0.91      AP: --  Glu: 168       Mg: --  P: --    eGFR: 88  eGFR, AA: 76

## 2021-12-21 NOTE — PROGRESS NOTE ADULT - SUBJECTIVE AND OBJECTIVE BOX
CC-  S/p mechanical fall     HPI:  88yo/M with PMH afib chronic, h/o DVT x20 years ago, factor V leiden deficiency on chronic Coumadin therapy, CAD, hyperlipidemia, sleep apnea presented s/p mechanical fall. Patient tripped over the curb and fell onto his RT side sustaining RT hip fracture. Denies LOC. NO head trauma. Given Vit K and taken for right hip loy.     pt seen and examined this am. Doing ok. Pain controlled. no sob/chest pain. tolerating po.    Review of system- All 10 systems reviewed and is as per HPI otherwise negative.     Vital Signs Last 24 Hrs  T(C): 36.4 (21 Dec 2021 08:55), Max: 36.7 (20 Dec 2021 16:55)  T(F): 97.5 (21 Dec 2021 08:55), Max: 98 (20 Dec 2021 16:55)  HR: 94 (21 Dec 2021 08:55) (65 - 100)  BP: 129/79 (21 Dec 2021 08:55) (94/53 - 138/86)  RR: 18 (21 Dec 2021 08:55) (12 - 24)  SpO2: 98% (21 Dec 2021 08:55) (90% - 99%)    PHYSICAL EXAM:    GENERAL: Comfortable, no acute distress   HEAD:  Normocephalic, atraumatic  EYES: EOMI, PERRLA  HEENT: dry mucous membranes  NECK: Supple, No JVD  NERVOUS SYSTEM:  Alert & Oriented X3, non focal  CHEST/LUNG: Clear to auscultation bilaterally  HEART: Regular rate and rhythm  ABDOMEN: Soft, Nontender, Nondistended, Bowel sounds present  GENITOURINARY: Voiding, no palpable bladder  EXTREMITIES:   No clubbing, cyanosis, or edema  MUSCULOSKELTAL-right hip dressing intact  SKIN-no rash    LABS:                        12.4   10.75 )-----------( 148      ( 21 Dec 2021 07:56 )             37.8     12-21    138  |  108  |  22  ----------------------------<  203<H>  5.0   |  25  |  0.95    Ca    8.8      21 Dec 2021 07:56      PT/INR - ( 21 Dec 2021 09:49 )   PT: 28.0 sec;   INR: 2.50 ratio         PTT - ( 21 Dec 2021 09:49 )  PTT:35.7 sec      RADIOLOGY & ADDITIONAL TESTS:  ACC: [id number] EXAM:  XR FEMUR 2 VIEWS RT                        ACC: [id number] EXAM:  XR HIP WITH PELV 2-3V RT                          PROCEDURE DATE:  12/16/2021          INTERPRETATION:  Right hip pain following fall.    3 views of the right hip including pelvis, and 2 views of the right femur.    There is a mildly displaced subcapital femoral neck fracture with   external rotation of the hip.  The pelvic ring appears intact.    Soft tissue vascular calcifications are present.    IMPRESSION:    Findings as discussed above.     XR CHEST PORTABLE URGENT 1V                          PROCEDURE DATE:  12/16/2021          INTERPRETATION:  Chest portable.    Clinical History: Fall, right hip fracture.    Comparison: 2/13/2020.    Single AP view submitted.    Overlying metallic external artifact.    The evaluation of the cardiomediastinal silhouette is limited on portable   technique.  Mildly tortuous, calcified aorta.    There is mild elevation of the right hemidiaphragm.  There is prominence of the bronchovascular markings at the bilateral   perihilar and lower lung zones, without lobar lung consolidation or   significant pleural effusion.    The evaluation for acute, nondisplaced rib fractures is limited on this   exam.    Impression:    Findings as discussed above.    MEDICATIONS  (STANDING):  acetaminophen     Tablet .. 650 milliGRAM(s) Oral every 6 hours  ascorbic acid 500 milliGRAM(s) Oral two times a day  aspirin enteric coated 81 milliGRAM(s) Oral daily  atorvastatin 40 milliGRAM(s) Oral at bedtime  carvedilol 12.5 milliGRAM(s) Oral every 12 hours  digoxin     Tablet 125 MICROGram(s) Oral daily  enalapril 5 milliGRAM(s) Oral daily  folic acid 1 milliGRAM(s) Oral daily  lactated ringers. 1000 milliLiter(s) (125 mL/Hr) IV Continuous <Continuous>  lactated ringers. 1000 milliLiter(s) (75 mL/Hr) IV Continuous <Continuous>  multivitamin 1 Tablet(s) Oral daily  pantoprazole    Tablet 40 milliGRAM(s) Oral before breakfast  polyethylene glycol 3350 17 Gram(s) Oral at bedtime  polyethylene glycol 3350 17 Gram(s) Oral daily  senna 2 Tablet(s) Oral at bedtime  tranexamic acid IVPB 2000 milliGRAM(s) IV Intermittent once  warfarin 1 milliGRAM(s) Oral daily    MEDICATIONS  (PRN):  benzocaine 15 mG/menthol 3.6 mG (Sugar-Free) Lozenge 1 Lozenge Oral every 2 hours PRN Sore Throat  melatonin 3 milliGRAM(s) Oral at bedtime PRN Sleep  ondansetron Injectable 4 milliGRAM(s) IV Push every 6 hours PRN Nausea and/or Vomiting  oxyCODONE    IR 5 milliGRAM(s) Oral every 3 hours PRN Moderate Pain (4 - 6)  oxyCODONE    IR 10 milliGRAM(s) Oral every 3 hours PRN Severe Pain (7 - 10)        Assessment/Plan  #S/p mechanical fall with RT hip fracture  -s/p VIt K,   -S/p right hip loy POD#1  -pain control with tylenol, prn oxy  -physical therapy  -incentive spirometry  -bowel regimen.     #acute blood loss anemia:  -d/t surgery  -no need for transfusion at this time.     #Afib chronic on coumadin  #H/o DVT/ factor V leiden  resume coumadin.    #CAD non-obstructive  Cont aspirin, statin  Last ECHO- EF 55%, mild AS    #DVT proph  INR therapeutic    #Dispo:  [facility] tomorrow if h/h stable.

## 2021-12-21 NOTE — PHYSICAL THERAPY INITIAL EVALUATION ADULT - STRENGTHENING, PT EVAL
15 min, ISIDRA precautions instructed and reviewed , ther-ex, GS, QS, AP 2 x 10 reps, AAROM R hip heel slides and hip ABD 2 x 10 reps

## 2021-12-21 NOTE — PATIENT PROFILE ADULT - FUNCTIONAL ASSESSMENT - BASIC MOBILITY 1.
I can provide a 1 month refill. But patient needs to go to lab first for urine screening test.  4 = No assist / stand by assistance

## 2021-12-21 NOTE — PHYSICAL THERAPY INITIAL EVALUATION ADULT - ACTIVE RANGE OF MOTION EXAMINATION, REHAB EVAL
R HIp NT/bilateral upper extremity Active ROM was WFL (within functional limits)/bilateral  lower extremity Active ROM was WFL (within functional limits)

## 2021-12-21 NOTE — PHYSICAL THERAPY INITIAL EVALUATION ADULT - GENERAL OBSERVATIONS, REHAB EVAL
Pt seen on 2N, NAD, apprehensive to get OOB but able to coax step by step. Dressing R hip C/D/i, +IV, SCD's B, Hip ABD pillow.

## 2021-12-21 NOTE — PHYSICAL THERAPY INITIAL EVALUATION ADULT - DISCHARGE DISPOSITION, PT EVAL
LARRY vs. HPT TBD on progress, Pt wants to recover at home and aware that he will have to make more progress to do so.

## 2021-12-21 NOTE — PROGRESS NOTE ADULT - ATTENDING COMMENTS
INR today is 1.9. Will proceed for right hip fracture surgery tomorrow if INR reduced.   Need NPO after midnight. The patient and the family are aware about the treatment plan and all other options and possible complication and the recovery.
INR today is 2.1. The surgery will be rescheduled for tomorrow if INR will be less than 1.7 and cleared.   Will put on NPO after midnight and repeat labs in AM.
Ortho stable. May discharge to rehab when cleared by PT and medicine and follow as outpatient.
For surgical fixation of right hip fracture today. The patient and the family aware about all possible complication and the recovery.

## 2021-12-21 NOTE — PHYSICAL THERAPY INITIAL EVALUATION ADULT - PERTINENT HX OF CURRENT PROBLEM, REHAB EVAL
86yo/M  s/p mechanical fall. Patient tripped over the curb and fell onto his RT side sustaining RT hip fracture. s/p R MARTINA , PMH afib chronic, h/o DVT x20 years ago, factor V leiden deficiency on chronic Coumadin therapy, CAD, hyperlipidemia, sleep apnea

## 2021-12-22 LAB
ANION GAP SERPL CALC-SCNC: 6 MMOL/L — SIGNIFICANT CHANGE UP (ref 5–17)
BUN SERPL-MCNC: 23 MG/DL — SIGNIFICANT CHANGE UP (ref 7–23)
CALCIUM SERPL-MCNC: 8.7 MG/DL — SIGNIFICANT CHANGE UP (ref 8.5–10.1)
CHLORIDE SERPL-SCNC: 105 MMOL/L — SIGNIFICANT CHANGE UP (ref 96–108)
CO2 SERPL-SCNC: 25 MMOL/L — SIGNIFICANT CHANGE UP (ref 22–31)
CREAT SERPL-MCNC: 1.13 MG/DL — SIGNIFICANT CHANGE UP (ref 0.5–1.3)
GLUCOSE SERPL-MCNC: 159 MG/DL — HIGH (ref 70–99)
HCT VFR BLD CALC: 40.4 % — SIGNIFICANT CHANGE UP (ref 39–50)
HGB BLD-MCNC: 13.3 G/DL — SIGNIFICANT CHANGE UP (ref 13–17)
INR BLD: 2.47 RATIO — HIGH (ref 0.88–1.16)
MCHC RBC-ENTMCNC: 32.9 GM/DL — SIGNIFICANT CHANGE UP (ref 32–36)
MCHC RBC-ENTMCNC: 33.3 PG — SIGNIFICANT CHANGE UP (ref 27–34)
MCV RBC AUTO: 101.3 FL — HIGH (ref 80–100)
PLATELET # BLD AUTO: 177 K/UL — SIGNIFICANT CHANGE UP (ref 150–400)
POTASSIUM SERPL-MCNC: 4.1 MMOL/L — SIGNIFICANT CHANGE UP (ref 3.5–5.3)
POTASSIUM SERPL-SCNC: 4.1 MMOL/L — SIGNIFICANT CHANGE UP (ref 3.5–5.3)
PROTHROM AB SERPL-ACNC: 27.4 SEC — HIGH (ref 10.6–13.6)
RBC # BLD: 3.99 M/UL — LOW (ref 4.2–5.8)
RBC # FLD: 12.8 % — SIGNIFICANT CHANGE UP (ref 10.3–14.5)
SODIUM SERPL-SCNC: 136 MMOL/L — SIGNIFICANT CHANGE UP (ref 135–145)
WBC # BLD: 11.83 K/UL — HIGH (ref 3.8–10.5)
WBC # FLD AUTO: 11.83 K/UL — HIGH (ref 3.8–10.5)

## 2021-12-22 PROCEDURE — 99232 SBSQ HOSP IP/OBS MODERATE 35: CPT

## 2021-12-22 PROCEDURE — 99231 SBSQ HOSP IP/OBS SF/LOW 25: CPT

## 2021-12-22 RX ADMIN — CARVEDILOL PHOSPHATE 12.5 MILLIGRAM(S): 80 CAPSULE, EXTENDED RELEASE ORAL at 10:34

## 2021-12-22 RX ADMIN — Medication 1 MILLIGRAM(S): at 10:38

## 2021-12-22 RX ADMIN — Medication 500 MILLIGRAM(S): at 17:28

## 2021-12-22 RX ADMIN — Medication 5 MILLIGRAM(S): at 10:34

## 2021-12-22 RX ADMIN — POLYETHYLENE GLYCOL 3350 17 GRAM(S): 17 POWDER, FOR SOLUTION ORAL at 10:34

## 2021-12-22 RX ADMIN — Medication 1 TABLET(S): at 10:39

## 2021-12-22 RX ADMIN — WARFARIN SODIUM 0.5 MILLIGRAM(S): 2.5 TABLET ORAL at 21:57

## 2021-12-22 RX ADMIN — Medication 125 MICROGRAM(S): at 10:34

## 2021-12-22 RX ADMIN — SENNA PLUS 2 TABLET(S): 8.6 TABLET ORAL at 21:56

## 2021-12-22 RX ADMIN — Medication 650 MILLIGRAM(S): at 01:54

## 2021-12-22 RX ADMIN — Medication 500 MILLIGRAM(S): at 06:34

## 2021-12-22 RX ADMIN — Medication 650 MILLIGRAM(S): at 17:28

## 2021-12-22 RX ADMIN — ATORVASTATIN CALCIUM 40 MILLIGRAM(S): 80 TABLET, FILM COATED ORAL at 21:56

## 2021-12-22 RX ADMIN — Medication 650 MILLIGRAM(S): at 21:56

## 2021-12-22 RX ADMIN — CARVEDILOL PHOSPHATE 12.5 MILLIGRAM(S): 80 CAPSULE, EXTENDED RELEASE ORAL at 21:56

## 2021-12-22 RX ADMIN — Medication 650 MILLIGRAM(S): at 10:33

## 2021-12-22 RX ADMIN — PANTOPRAZOLE SODIUM 40 MILLIGRAM(S): 20 TABLET, DELAYED RELEASE ORAL at 06:34

## 2021-12-22 RX ADMIN — OXYCODONE HYDROCHLORIDE 10 MILLIGRAM(S): 5 TABLET ORAL at 10:35

## 2021-12-22 RX ADMIN — Medication 650 MILLIGRAM(S): at 17:26

## 2021-12-22 RX ADMIN — Medication 650 MILLIGRAM(S): at 10:36

## 2021-12-22 RX ADMIN — Medication 650 MILLIGRAM(S): at 22:03

## 2021-12-22 RX ADMIN — Medication 81 MILLIGRAM(S): at 10:33

## 2021-12-22 RX ADMIN — OXYCODONE HYDROCHLORIDE 10 MILLIGRAM(S): 5 TABLET ORAL at 11:35

## 2021-12-22 NOTE — PROGRESS NOTE ADULT - SUBJECTIVE AND OBJECTIVE BOX
CC-  S/p mechanical fall     HPI:  88yo/M with PMH afib chronic, h/o DVT x20 years ago, factor V leiden deficiency on chronic Coumadin therapy, CAD, hyperlipidemia, sleep apnea presented s/p mechanical fall. Patient tripped over the curb and fell onto his RT side sustaining RT hip fracture. Denies LOC. NO head trauma. Given Vit K and taken for right hip loy.     12/22: pt seen and examined this am at bedsidee. Doing ok. Pain controlled. no sob/chest pain. tolerating po. no bm    Review of system- All 10 systems reviewed and is as per HPI otherwise negative.     Vital Signs Last 24 Hrs  T(C): 36.7 (12-22-21 @ 08:25), Max: 37.1 (12-21-21 @ 20:32)  T(F): 98 (12-22-21 @ 08:25), Max: 98.7 (12-21-21 @ 20:32)  HR: 88 (12-22-21 @ 10:37) (55 - 94)  BP: 123/83 (12-22-21 @ 08:25) (105/64 - 126/75)  BP(mean): --  RR: 16 (12-22-21 @ 08:25) (16 - 18)  SpO2: 96% (12-22-21 @ 08:25) (95% - 96%)      PHYSICAL EXAM:    GENERAL: Comfortable, no acute distress   HEAD:  Normocephalic, atraumatic  EYES: EOMI, PERRLA  HEENT: dry mucous membranes  NECK: Supple, No JVD  NERVOUS SYSTEM:  Alert & Oriented X3, non focal  CHEST/LUNG: Clear to auscultation bilaterally  HEART: Regular rate and rhythm  ABDOMEN: Soft, Nontender, Nondistended, Bowel sounds present  GENITOURINARY: Voiding, no palpable bladder  EXTREMITIES:   No clubbing, cyanosis, or edema  MUSCULOSKELETAL-right hip dressing intact  SKIN-no rash    LABS:                                   13.3   11.83 )-----------( 177      ( 22 Dec 2021 08:18 )             40.4       12-22    136  |  105  |  23  ----------------------------<  159<H>  4.1   |  25  |  1.13    Ca    8.7      22 Dec 2021 08:18        PT/INR - ( 22 Dec 2021 08:18 )   PT: 27.4 sec;   INR: 2.47 ratio         PTT - ( 21 Dec 2021 09:49 )  PTT:35.7 sec    RADIOLOGY & ADDITIONAL TESTS:  ACC: [id number] EXAM:  XR FEMUR 2 VIEWS RT                        ACC: [id number] EXAM:  XR HIP WITH PELV 2-3V RT                          PROCEDURE DATE:  12/16/2021          INTERPRETATION:  Right hip pain following fall.    3 views of the right hip including pelvis, and 2 views of the right femur.    There is a mildly displaced subcapital femoral neck fracture with   external rotation of the hip.  The pelvic ring appears intact.    Soft tissue vascular calcifications are present.    IMPRESSION:    Findings as discussed above.     XR CHEST PORTABLE URGENT 1V                          PROCEDURE DATE:  12/16/2021          INTERPRETATION:  Chest portable.    Clinical History: Fall, right hip fracture.    Comparison: 2/13/2020.    Single AP view submitted.    Overlying metallic external artifact.    The evaluation of the cardiomediastinal silhouette is limited on portable   technique.  Mildly tortuous, calcified aorta.    There is mild elevation of the right hemidiaphragm.  There is prominence of the bronchovascular markings at the bilateral   perihilar and lower lung zones, without lobar lung consolidation or   significant pleural effusion.    The evaluation for acute, nondisplaced rib fractures is limited on this   exam.    Impression:    Findings as discussed above.    MEDICATIONS  (STANDING):  acetaminophen     Tablet .. 650 milliGRAM(s) Oral every 6 hours  ascorbic acid 500 milliGRAM(s) Oral two times a day  aspirin enteric coated 81 milliGRAM(s) Oral daily  atorvastatin 40 milliGRAM(s) Oral at bedtime  carvedilol 12.5 milliGRAM(s) Oral every 12 hours  digoxin     Tablet 125 MICROGram(s) Oral daily  enalapril 5 milliGRAM(s) Oral daily  folic acid 1 milliGRAM(s) Oral daily  lactated ringers. 1000 milliLiter(s) (125 mL/Hr) IV Continuous <Continuous>  lactated ringers. 1000 milliLiter(s) (75 mL/Hr) IV Continuous <Continuous>  multivitamin 1 Tablet(s) Oral daily  pantoprazole    Tablet 40 milliGRAM(s) Oral before breakfast  polyethylene glycol 3350 17 Gram(s) Oral at bedtime  polyethylene glycol 3350 17 Gram(s) Oral daily  senna 2 Tablet(s) Oral at bedtime  tranexamic acid IVPB 2000 milliGRAM(s) IV Intermittent once  warfarin 0.5 milliGRAM(s) Oral daily    MEDICATIONS  (PRN):  benzocaine 15 mG/menthol 3.6 mG (Sugar-Free) Lozenge 1 Lozenge Oral every 2 hours PRN Sore Throat  melatonin 3 milliGRAM(s) Oral at bedtime PRN Sleep  ondansetron Injectable 4 milliGRAM(s) IV Push every 6 hours PRN Nausea and/or Vomiting  oxyCODONE    IR 10 milliGRAM(s) Oral every 3 hours PRN Severe Pain (7 - 10)  oxyCODONE    IR 5 milliGRAM(s) Oral every 3 hours PRN Moderate Pain (4 - 6)        Assessment/Plan  #S/p mechanical fall with RT hip fracture  -S/p right hip loy POD#2  -pain control with tylenol, prn oxy  -physical therapy  -incentive spirometry  -bowel regimen.     #acute blood loss anemia:  -d/t surgery  -no need for transfusion at this time.     #leukocytosis  likely reactive  pt is afebrile  monitor wbc    #Afib chronic on coumadin  #H/o DVT/ factor V leiden   coumadin per [facility]    #CAD non-obstructive  Cont aspirin, statin  Last ECHO- EF 55%, mild AS    #DVT proph  INR therapeutic    #Dispo:  [facility] tomorrow

## 2021-12-22 NOTE — PROGRESS NOTE ADULT - SUBJECTIVE AND OBJECTIVE BOX
HPI:  Patient is a 87y old male who presents with a chief complaint of right hip pain s/p fall.    Consulted by Dr. Grider for VTE prophylaxis, risk stratification, and anticoagulation management.    PAST MEDICAL & SURGICAL HISTORY:  Afib  DVT (deep venous thrombosis)  Hyperlipemia  H/O cardiomyopathy  Factor V Leiden  History of appendectomy    Interval History:  : Patient seen at bedside denying any discomfort. Endorses that he takes 1 mg warfarin daily and INR monitored via [facility] and Dr. Loja Advised to eat greens today and tomorrow. When INR less that 2.0 will start UFH until OR : Patient seen at bedside, INR 2.1, possible surgery tomorrow, advised patient to have Vit K foods   : Patient seen at bedside, INR 1.94, pending surgery today  : Patient seen at bedside INR 2.1. Will order FP. He is pleasant and denying any pain. NPO for OR today.  2021 Pt seen at bedside on [facility].  Discussed his anticoagulation with coumadin.  Pt sates he has been on it for more then 20years and his normal dose is 1mg daily. Discussed with him his INR of 2.5 and giving him only .5mg tonight.  Will reevaluate tomorrow.    2021 Pt seen at bedside on [facility].  c/o of constipation.  Discussed with pt the use of prunes , apple sauce or anything that helps him move his bowels.  Discussed his dosing of coumadin and his INR  of 2.49 today.   BMI: 28.9    CrCl:63.3    Incision Time:  Procedure End Time:  Tourniquet Time:  EBL:    Caprini VTE Risk Score:   CAPRINI SCORE  AGE RELATED RISK FACTORS                                                       MOBILITY RELATED FACTORS  [ ] Age 41-60 years                                            (1 Point)                  [ ] Bed rest /restricted mobility                      (1 Point)  [ ] Age: 61-74 years                                           (2 Points)                [ ] Plaster cast                                                   (2 Points)  [x ] Age= 75 years                                              (3 Points)                 [ ] Bed bound for more than 72 hours                   (2 Points)    DISEASE RELATED RISK FACTORS                                               GENDER SPECIFIC FACTORS  [ ] Edema in the lower extremities                       (1 Point)           [ ] Pregnancy                                                            (1 Point)  [ ] Varicose veins                                               (1 Point)                  [ ] Post-partum < 6 weeks                                      (1 Point)             [ x] BMI > 25 Kg/m2                                            (1 Point)                  [ ] Hormonal therapy or oral contraception       (1 Point)                 [ ] Sepsis (in the previous month)                        (1 Point)             [ ] History of pregnancy complications                (1Point)  [ ] Pneumonia or serious lung disease                                             [ ] Unexplained or recurrent  (=/>3), premature                                 (In the previous month)                               (1 Point)                birth with toxemia or growth-restricted infant (1 Point)  [ ] Abnormal pulmonary function test            (1 Point)                                   SURGERY RELATED RISK FACTORS  [ ] Acute myocardial infarction                       (1 Point)                  [ ]  Section                                         (1 Point)  [ ] Congestive heart failure (in the previous month) (1 Point)   [ ] Minor surgery   lasting <45 minutes       (1 Point)   [ ] Inflammatory bowel disease                             (1 Point)          [ ] Arthroscopic surgery                                  (2 Points)  [ ] Central venous access                                    (2 Points)            [ ] General surgery lasting >45 minutes      (2 Points)       [ ] Stroke (in the previous month)                  (5 Points)            [ ] Elective major lower extremity arthroplasty (5 Points)                                   [  ] Malignancy (present or past include skin melanoma                                          but exclude  basal skin cell)    (2 points)                                      TRAUMA RELATED RISK FACTORS                HEMATOLOGY RELATED FACTORS                                  [x ] Fracture of the hip, pelvis, or leg                       (5 Points)  [x ] Prior episodes of VTE                                     (3 Points)          [ ] Acute spinal cord injury (in the previous month)  (5 Points)  [ ] Positive family history for VTE                         (3 Points)       [ ] Paralysis (less than 1 month)                          (5 Points)  [ ] Prothrombin 76168 A                                      (3 Points)         [ ] Multiple Trauma (within 1month)                 (5Points)                                                                                                                                                                [ ] Factor V Leiden                                          (3 Points)                                OTHER RISK FACTORS                          [ ] Lupus anticoagulants                                     (3 Points)                     [ ] BMI > 40                          (1 Point)                                                         [ ] Anticardiolipin antibodies                                (3 Points)                 [ ] Smoking                              (1Point)                                                [ ] High homocysteine in the blood                      (3 Points)                [  ] Diabetes requiring insulin (1point)                         [ ] Other congenital or acquired thrombophilia       (3 Points)          [  ] Chemotherapy                   (1 Point)  [ ] Heparin induced thrombocytopenia                  (3 Points)             [  ] Blood Transfusion                (1 point)                                                                                                             Total Score [12 ]                                                                                                                                                                                                                                                                                                                                                                                                                          [id number] Score: #4    IMPROVE Bleeding Risk Score:4.5      Falls Risk:   High ( x )  Mod (  )  Low (  )      FAMILY HISTORY:  FH: diabetes mellitus    FH: breast cancer      Denies any personal or familial history of clotting or bleeding disorders.    Allergies    penicillin (Unknown)    Intolerances        REVIEW OF SYSTEMS    (  )Fever	        ( x )Constipation	(  )SOB				  (  )Headache   (  )Dysuria  (  )Chills	        (  )Melena	        (  )Dyspnea on exertion (  )Dizziness    (  )Polyuria  (  )Nausea      (  )Hematochezia	(  )Cough                          (  )Syncope      (  )Hematuria  (  )Vomiting   (  )Chest Pain	        (  )Wheezing			  (  )Weakness  (  )Diarrhea    (  )Palpitations	(  )Anorexia			  (  )Myalgia       (  x ) Arthralgia    Pertinent positives in HPI and daily subjective. All other systems negative.    Vital Signs Last 24 Hrs  T(C): 36.7 (21 @ 08:25), Max: 37.1 (21 @ 20:32)  T(F): 98 (21 @ 08:25), Max: 98.7 (21 @ 20:32)  HR: 55 (21 @ 08:25) (55 - 94)  BP: 123/83 (21 @ 08:25) (105/64 - 126/75)  BP(mean): --  RR: 16 (21 @ 08:25) (16 - 18)  SpO2: 96% (21 @ 08:25) (95% - 96%)    PHYSICAL EXAM:    Constitutional: Appears Well    Neurological: A& O x 3    Skin: Warm    Respiratory and Thorax: normal effort; Breath sounds: normal; No rales/wheezing/rhonchi  	  Cardiovascular: S1, S2, regular, NMBR	    Gastrointestinal: BS + x 4Q, nontender	    Genitourinary:  Bladder nondistended, nontender    Musculoskeletal:   General Right:   no muscle/joint tenderness,   normal tone, no joint swelling,   ROM: limited	    General Left:   no muscle/joint tenderness,   normal tone, no joint swelling,   ROM: full    Hip:  Right:     Lower extrems:   Right: no calf tenderness              negative olivia's sign               + pedal pulses    Left:   no calf tenderness              negative olivia's sign               + pedal pulses                                 13.3   11.83 )-----------( 177      ( 22 Dec 2021 08:18 )             40.4           136  |  105  |  23  ----------------------------<  159<H>  4.1   |  25  |  1.13    Ca    8.7      22 Dec 2021 08:18       PTT - ( 21 Dec 2021 09:49 )  PTT:35.7 sec             12.4   10.75 )-----------( 148      ( 21 Dec 2021 07:56 )             37.8       12    138  |  108  |  22  ----------------------------<  203<H>  5.0   |  25  |  0.95    Ca    8.8      21 Dec 2021 07:56     PTT - ( 21 Dec 2021 09:49 )  PTT:35.7 sec                        13.2   9.94  )-----------( 139      ( 20 Dec 2021 07:19 )             40.8           138  |  109<H>  |  25<H>  ----------------------------<  156<H>  4.4   |  24  |  0.92    Ca    8.3<L>      20 Dec 2021 07:19        PT/INR - ( 20 Dec 2021 07:19 )   PT: 23.9 sec;   INR: 2.14 ratio         PTT - ( 20 Dec 2021 07:19 )  PTT:35.4 sec                           13.3   10.15 )-----------( 126      ( 19 Dec 2021 07:20 )             40.6           139  |  108  |  25<H>  ----------------------------<  147<H>  4.4   |  27  |  1.02    Ca    8.6      19 Dec 2021 07:20    TPro  x   /  Alb  3.1<L>  /  TBili  x   /  DBili  x   /  AST  x   /  ALT  x   /  AlkPhos  x         PT/INR - ( 19 Dec 2021 07:20 )   PT: 22.0 sec;   INR: 1.94 ratio         PTT - ( 19 Dec 2021 07:20 )  PTT:35.1 sec                    13.9   13.14 )-----------( 132      ( 18 Dec 2021 07:23 )             42.3       12    137  |  106  |  28<H>  ----------------------------<  176<H>  4.3   |  25  |  1.13    Ca    8.6      18 Dec 2021 07:23    TPro  x   /  Alb  3.1<L>  /  TBili  x   /  DBili  x   /  AST  x   /  ALT  x   /  AlkPhos  x   12-18  PT/INR - ( 22 Dec 2021 08:18 )   PT: 27.4 sec;   INR: 2.47 ratio   PT/INR - ( 21 Dec 2021 09:49 )   PT: 28.0 sec;   INR: 2.50 ratio    PT/INR - ( 20 Dec 2021 07:19 )   PT: 23.9 sec;   INR: 2.14 ratio    PT/INR - ( 19 Dec 2021 07:20 )   PT: 22.0 sec;   INR: 1.94 ratio    PT/INR - ( 18 Dec 2021 07:23 )   PT: 23.7 sec;   INR: 2.12 ratio    PT/INR - ( 17 Dec 2021 07:59 )   PT: 27.5 sec;   INR: 2.45 ratio      MEDICATIONS  (STANDING):  acetaminophen     Tablet .. 650 milliGRAM(s) Oral every 6 hours  ascorbic acid 500 milliGRAM(s) Oral two times a day  aspirin enteric coated 81 milliGRAM(s) Oral daily  atorvastatin 40 milliGRAM(s) Oral at bedtime  carvedilol 12.5 milliGRAM(s) Oral every 12 hours  digoxin     Tablet 125 MICROGram(s) Oral daily  enalapril 5 milliGRAM(s) Oral daily  folic acid 1 milliGRAM(s) Oral daily  lactated ringers. 1000 milliLiter(s) IV Continuous <Continuous>  lactated ringers. 1000 milliLiter(s) IV Continuous <Continuous>  multivitamin 1 Tablet(s) Oral daily  pantoprazole    Tablet 40 milliGRAM(s) Oral before breakfast  polyethylene glycol 3350 17 Gram(s) Oral daily  polyethylene glycol 3350 17 Gram(s) Oral at bedtime  senna 2 Tablet(s) Oral at bedtime  tranexamic acid IVPB 2000 milliGRAM(s) IV Intermittent once  warfarin 0.5 milliGRAM(s) Oral daily          **Current DVT Prophylaxis:    [facility]                   (  )  Heparin SQ           (  )  Coumadin             (x  )  Xarelto                  (  )  Eliquis                   (  )  Venodynes           (  x)  Ambulation          ( x )  UFH                       ( )  ECASA                   (  )  Contraindicated  (  )

## 2021-12-23 ENCOUNTER — TRANSCRIPTION ENCOUNTER (OUTPATIENT)
Age: 86
End: 2021-12-23

## 2021-12-23 VITALS — TEMPERATURE: 98 F | DIASTOLIC BLOOD PRESSURE: 60 MMHG | SYSTOLIC BLOOD PRESSURE: 139 MMHG | HEART RATE: 85 BPM

## 2021-12-23 LAB
ANION GAP SERPL CALC-SCNC: 4 MMOL/L — LOW (ref 5–17)
BUN SERPL-MCNC: 19 MG/DL — SIGNIFICANT CHANGE UP (ref 7–23)
CALCIUM SERPL-MCNC: 8.6 MG/DL — SIGNIFICANT CHANGE UP (ref 8.5–10.1)
CHLORIDE SERPL-SCNC: 105 MMOL/L — SIGNIFICANT CHANGE UP (ref 96–108)
CO2 SERPL-SCNC: 27 MMOL/L — SIGNIFICANT CHANGE UP (ref 22–31)
CREAT SERPL-MCNC: 0.97 MG/DL — SIGNIFICANT CHANGE UP (ref 0.5–1.3)
GLUCOSE SERPL-MCNC: 194 MG/DL — HIGH (ref 70–99)
HCT VFR BLD CALC: 37.7 % — LOW (ref 39–50)
HGB BLD-MCNC: 12.1 G/DL — LOW (ref 13–17)
INR BLD: 2.33 RATIO — HIGH (ref 0.88–1.16)
MCHC RBC-ENTMCNC: 32.1 GM/DL — SIGNIFICANT CHANGE UP (ref 32–36)
MCHC RBC-ENTMCNC: 32.5 PG — SIGNIFICANT CHANGE UP (ref 27–34)
MCV RBC AUTO: 101.3 FL — HIGH (ref 80–100)
PLATELET # BLD AUTO: 166 K/UL — SIGNIFICANT CHANGE UP (ref 150–400)
POTASSIUM SERPL-MCNC: 4 MMOL/L — SIGNIFICANT CHANGE UP (ref 3.5–5.3)
POTASSIUM SERPL-SCNC: 4 MMOL/L — SIGNIFICANT CHANGE UP (ref 3.5–5.3)
PROTHROM AB SERPL-ACNC: 26.2 SEC — HIGH (ref 10.6–13.6)
RBC # BLD: 3.72 M/UL — LOW (ref 4.2–5.8)
RBC # FLD: 12.8 % — SIGNIFICANT CHANGE UP (ref 10.3–14.5)
SARS-COV-2 RNA SPEC QL NAA+PROBE: SIGNIFICANT CHANGE UP
SODIUM SERPL-SCNC: 136 MMOL/L — SIGNIFICANT CHANGE UP (ref 135–145)
WBC # BLD: 11.01 K/UL — HIGH (ref 3.8–10.5)
WBC # FLD AUTO: 11.01 K/UL — HIGH (ref 3.8–10.5)

## 2021-12-23 PROCEDURE — 99231 SBSQ HOSP IP/OBS SF/LOW 25: CPT

## 2021-12-23 PROCEDURE — 99239 HOSP IP/OBS DSCHRG MGMT >30: CPT

## 2021-12-23 RX ORDER — FOLIC ACID 0.8 MG
1 TABLET ORAL
Qty: 0 | Refills: 0 | DISCHARGE
Start: 2021-12-23

## 2021-12-23 RX ORDER — OXYCODONE HYDROCHLORIDE 5 MG/1
1 TABLET ORAL
Qty: 0 | Refills: 0 | DISCHARGE
Start: 2021-12-23

## 2021-12-23 RX ORDER — POLYETHYLENE GLYCOL 3350 17 G/17G
17 POWDER, FOR SOLUTION ORAL
Qty: 0 | Refills: 0 | DISCHARGE
Start: 2021-12-23

## 2021-12-23 RX ORDER — WARFARIN SODIUM 2.5 MG/1
1 TABLET ORAL
Qty: 0 | Refills: 0 | DISCHARGE

## 2021-12-23 RX ORDER — SENNA PLUS 8.6 MG/1
2 TABLET ORAL
Qty: 0 | Refills: 0 | DISCHARGE
Start: 2021-12-23

## 2021-12-23 RX ORDER — WARFARIN SODIUM 2.5 MG/1
1 TABLET ORAL DAILY
Refills: 0 | Status: DISCONTINUED | OUTPATIENT
Start: 2021-12-23 | End: 2021-12-23

## 2021-12-23 RX ORDER — BNT162B2 0.23 MG/2.25ML
0.3 INJECTION, SUSPENSION INTRAMUSCULAR
Qty: 0 | Refills: 0 | DISCHARGE

## 2021-12-23 RX ORDER — ASCORBIC ACID 60 MG
1 TABLET,CHEWABLE ORAL
Qty: 0 | Refills: 0 | DISCHARGE
Start: 2021-12-23

## 2021-12-23 RX ORDER — ACETAMINOPHEN 500 MG
2 TABLET ORAL
Qty: 0 | Refills: 0 | DISCHARGE
Start: 2021-12-23

## 2021-12-23 RX ORDER — PANTOPRAZOLE SODIUM 20 MG/1
1 TABLET, DELAYED RELEASE ORAL
Qty: 0 | Refills: 0 | DISCHARGE
Start: 2021-12-23

## 2021-12-23 RX ADMIN — Medication 125 MICROGRAM(S): at 09:39

## 2021-12-23 RX ADMIN — CARVEDILOL PHOSPHATE 12.5 MILLIGRAM(S): 80 CAPSULE, EXTENDED RELEASE ORAL at 09:39

## 2021-12-23 RX ADMIN — Medication 1 MILLIGRAM(S): at 09:39

## 2021-12-23 RX ADMIN — Medication 5 MILLIGRAM(S): at 09:40

## 2021-12-23 RX ADMIN — Medication 500 MILLIGRAM(S): at 05:11

## 2021-12-23 RX ADMIN — Medication 650 MILLIGRAM(S): at 05:12

## 2021-12-23 RX ADMIN — Medication 650 MILLIGRAM(S): at 13:18

## 2021-12-23 RX ADMIN — Medication 81 MILLIGRAM(S): at 09:39

## 2021-12-23 RX ADMIN — Medication 1 TABLET(S): at 09:39

## 2021-12-23 RX ADMIN — PANTOPRAZOLE SODIUM 40 MILLIGRAM(S): 20 TABLET, DELAYED RELEASE ORAL at 05:12

## 2021-12-23 RX ADMIN — Medication 650 MILLIGRAM(S): at 05:11

## 2021-12-23 RX ADMIN — Medication 650 MILLIGRAM(S): at 13:19

## 2021-12-23 NOTE — PROGRESS NOTE ADULT - ASSESSMENT
87M POD1 R hip hemiarthroplasty    Plan:   WBAT with posterior hip precautions/PT/OT  Pain management PRN  DVT prophylaxis: HSQ, appreciate anticoagulation team recommendations.   Incentive spirometry  Dispo: pending recommendations per PT  Will discuss with Dr. Michaels and will advise for any changes to the plan.   
87M POD3 R hip hemiarthroplasty    Plan:   Medical management appreciated.   WBAT with posterior hip precautions/PT/OT  Pain management PRN  DVT prophylaxis: per AC team: L/Coumdadin  Incentive spirometry  Dispo: [facility] per PT  Will discuss with Dr. Michaels and will advise for any changes to the plan.   
This is an 87 year old male s/p fall with right hip fracture awaiting OR on Monday. 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 He is very high risk for VTE. Recommend holding warfarin until INR less than 2.0 then initiate UFH until OR. Then bridge post-op with warfarin and therapeutic lovenox.    Plan:  ::Hold warfarin  ::INR 2.1 still after holding warfarin x 4 days  ::One unit FFP today then for OR if INR < 2.0  ::Start UFH with no bolus 6-10 hours post op   ::Will resume Coumadin tomorrow if stable   ::Bedrest  ::Venmike b/l    Will continue to follow.  Dispo: 
This is an 87 year old male s/p fall with right hip fracture awaiting OR on Monday. 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 He is very high risk for VTE. Recommend holding warfarin until INR less than 2.0 then initiate UFH until OR. Then bridge post-op with warfarin and therapeutic lovenox.    Plan:  ::Hold warfarin  ::start UFH with no bolus 6-10 hours post op   :: will resume Coumadin tomorrow if stable   ::Bedrest  ::Ashlie b/l    :: will continue to follow.  Dispo: 
This is an 87 year old male s/p fall with right hip fracture awaiting OR on Monday. 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 He is very high risk for VTE. Recommend holding warfarin until INR less than 2.0 then initiate UFH until OR. Then bridge post-op with warfarin and therapeutic lovenox.    Plan:  ::coumadin .5mg po daily adjust with INR, INR NOTED 2.47  : DAILY pt/inr, cbc  ::Venodynes b/l  Will continue to follow.  [facility]
87M POD0 R hip hemiarthroplasty    Plan:   Medical management appreciated.   WBAT with posterior hip precautions/PT/OT  Pain management PRN  DVT prophylaxis: HSQ tonight, appreciate anticoagulation team recommendations.   Incentive spirometry  Dispo: pending recommendations from PT  Will discuss with Dr. Michaels, and will advise for any changes to the plan.   
87M POD2 R hip hemiarthroplasty    Plan:   WBAT with posterior hip precautions/PT/OT  Pain management PRN  DVT prophylaxis: per AC team: L/Coumdadin  Incentive spirometry  Dispo: pending recommendations per PT  Will discuss with Dr. Michaels and will advise for any changes to the plan.   
87M with R femoral neck fracture    -Tentative plan for OR today pending AM INR  -NPO except medications  -IVF while NPO  -Please hold chemical DVT ppx, SCDs okay  -FU Preop labs  -Medical management appreciated by primary medical team, clearance documented  -Multimodal Analgesia - recommend low dose opioids and acetaminophen as tolerated  -NWB, bedrest  -All Patient's and Family Member's questions answered. Patient/family understand and agree w/ plan.  -Attending is aware and agrees with plan  
This is an 87 year old male s/p fall with right hip fracture awaiting OR on Monday. 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 He is very high risk for VTE. Recommend holding warfarin until INR less than 2.0 then initiate UFH until OR. Then bridge post-op with warfarin and therapeutic lovenox.    Plan:  ::Warfarin 1mg PO daily for target INR 2.0-3.0  :;Daily CBC/PT/INR  ::Ashlie b/l  Will continue to follow.    Dispo: [facility]hab [facility]
S/P right hip hemiarthroplasty.    Plan:  PT/WBAT RLE with walker and posterior right hip dislocation precaution.  Pain managements  DVT prophylaxis  Daily wound care of the small decubitus ulcer that was presented prior to the surgery today.  Ice Rt hip  NV and compartments check RLE  Incentive spirometry.  F/U labs  May discharge to rehab tomorrow and follow as outpatient if cleared by PT and medicine.
This is an 87 year old male s/p fall with right hip fracture awaiting OR on Monday. 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 He is very high risk for VTE. Recommend holding warfarin until INR less than 2.0 then initiate UFH until OR. Then bridge post-op with warfarin and therapeutic lovenox.    Plan:  ::Hold warfarin  ::UFH when INR <2.0  ::Post-op bridging recommended  ::Bedrest  ::Ashlie b/l    :: will continue to follow.  Dispo: 
This is an 87 year old male s/p fall with right hip fracture awaiting OR on Monday. 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 He is very high risk for VTE. Recommend holding warfarin until INR less than 2.0 then initiate UFH until OR. Then bridge post-op with warfarin and therapeutic lovenox.    Plan:  ::coumadin .5mg po daily adjust with INR, INR NOTED 2.5  : DAILY pt/inr, cbc  ::Venodynes b/l    Will continue to follow.  Dispo: rehab

## 2021-12-23 NOTE — PROGRESS NOTE ADULT - SUBJECTIVE AND OBJECTIVE BOX
CC-  S/p mechanical fall     HPI:  86yo/M with PMH afib chronic, h/o DVT x20 years ago, factor V leiden deficiency on chronic Coumadin therapy, CAD, hyperlipidemia, sleep apnea presented s/p mechanical fall. Patient tripped over the curb and fell onto his RT side sustaining RT hip fracture. Denies LOC. NO head trauma. Given Vit K and taken for right hip loy.     12/22: pt seen and examined this am at bedside. Doing ok. Pain controlled. no sob/chest pain. tolerating po. no bm  12/23 up with PT, doing OK, going to rehab    Review of system- All 10 systems reviewed and is as per HPI otherwise negative.     Vital Signs Last 24 Hrs  T(C): 36.7 (23 Dec 2021 09:40), Max: 36.7 (23 Dec 2021 00:47)  T(F): 98.1 (23 Dec 2021 09:40), Max: 98.1 (23 Dec 2021 09:40)  HR: 85 (23 Dec 2021 09:40) (85 - 95)  BP: 139/60 (23 Dec 2021 09:40) (107/54 - 139/60)  BP(mean): --  RR: 17 (23 Dec 2021 00:47) (17 - 17)  SpO2: 96% (23 Dec 2021 00:47) (96% - 96%)    PHYSICAL EXAM:  GENERAL: Comfortable, no acute distress   HEAD:  Normocephalic, atraumatic  EYES: EOMI, PERRLA  HEENT: dry mucous membranes  NECK: Supple, No JVD  NERVOUS SYSTEM:  Alert & Oriented X3, non focal  CHEST/LUNG: Clear to auscultation bilaterally  HEART: Regular rate and rhythm  ABDOMEN: Soft, Nontender, Nondistended, Bowel sounds present  GENITOURINARY: Voiding, no palpable bladder  EXTREMITIES:   No clubbing, cyanosis, or edema  MUSCULOSKELETAL-right hip dressing intact  SKIN-no rash    LABS:                        12.1   11.01 )-----------( 166      ( 23 Dec 2021 08:23 )             37.7     23 Dec 2021 08:23    136    |  105    |  19     ----------------------------<  194    4.0     |  27     |  0.97     Ca    8.6        23 Dec 2021 08:23    PT/INR - ( 23 Dec 2021 08:23 )   PT: 26.2 sec;   INR: 2.33 ratio      RADIOLOGY & ADDITIONAL TESTS:  ACC: [id number] EXAM:  XR FEMUR 2 VIEWS RT                        ACC: [id number] EXAM:  XR HIP WITH PELV 2-3V RT                        PROCEDURE DATE:  12/16/2021    INTERPRETATION:  Right hip pain following fall.    3 views of the right hip including pelvis, and 2 views of the right femur.    There is a mildly displaced subcapital femoral neck fracture with   external rotation of the hip.  The pelvic ring appears intact.    Soft tissue vascular calcifications are present.    IMPRESSION:    Findings as discussed above.     XR CHEST PORTABLE URGENT 1V                          PROCEDURE DATE:  12/16/2021          INTERPRETATION:  Chest portable.    Clinical History: Fall, right hip fracture.    Comparison: 2/13/2020.    Single AP view submitted.    Overlying metallic external artifact.    The evaluation of the cardiomediastinal silhouette is limited on portable   technique.  Mildly tortuous, calcified aorta.    There is mild elevation of the right hemidiaphragm.  There is prominence of the bronchovascular markings at the bilateral   perihilar and lower lung zones, without lobar lung consolidation or   significant pleural effusion.    The evaluation for acute, nondisplaced rib fractures is limited on this   exam.    Impression:    Findings as discussed above.    MEDICATIONS  (STANDING):  acetaminophen     Tablet .. 650 milliGRAM(s) Oral every 6 hours  ascorbic acid 500 milliGRAM(s) Oral two times a day  aspirin enteric coated 81 milliGRAM(s) Oral daily  atorvastatin 40 milliGRAM(s) Oral at bedtime  carvedilol 12.5 milliGRAM(s) Oral every 12 hours  digoxin     Tablet 125 MICROGram(s) Oral daily  enalapril 5 milliGRAM(s) Oral daily  folic acid 1 milliGRAM(s) Oral daily  lactated ringers. 1000 milliLiter(s) (125 mL/Hr) IV Continuous <Continuous>  lactated ringers. 1000 milliLiter(s) (75 mL/Hr) IV Continuous <Continuous>  multivitamin 1 Tablet(s) Oral daily  pantoprazole    Tablet 40 milliGRAM(s) Oral before breakfast  polyethylene glycol 3350 17 Gram(s) Oral at bedtime  polyethylene glycol 3350 17 Gram(s) Oral daily  senna 2 Tablet(s) Oral at bedtime  tranexamic acid IVPB 2000 milliGRAM(s) IV Intermittent once  warfarin 1 milliGRAM(s) Oral daily    MEDICATIONS  (PRN):  benzocaine 15 mG/menthol 3.6 mG (Sugar-Free) Lozenge 1 Lozenge Oral every 2 hours PRN Sore Throat  melatonin 3 milliGRAM(s) Oral at bedtime PRN Sleep  ondansetron Injectable 4 milliGRAM(s) IV Push every 6 hours PRN Nausea and/or Vomiting  oxyCODONE    IR 10 milliGRAM(s) Oral every 3 hours PRN Severe Pain (7 - 10)  oxyCODONE    IR 5 milliGRAM(s) Oral every 3 hours PRN Moderate Pain (4 - 6)    Assessment/Plan  #S/p mechanical fall with RT hip fracture  -S/p right hip loy POD#3  -pain control with tylenol, prn oxy  -physical therapy  -incentive spirometry  -bowel regimen.     #acute blood loss anemia:  -d/t surgery  -no need for transfusion at this time.     #leukocytosis  likely reactive  pt is afebrile  monitor wbc    #Afib chronic on coumadin  #H/o DVT/ factor V leiden  coumadin per AC services    #CAD non-obstructive  Cont aspirin, statin  Last ECHO- EF 55%, mild AS    #DVT proph- coumadin    #Dispo: [facility] today. DC time 45 mins

## 2021-12-23 NOTE — DISCHARGE NOTE NURSING/CASE MANAGEMENT/SOCIAL WORK - NSDCPEFALRISK_GEN_ALL_CORE
For information on Fall & Injury Prevention, visit: [email or web address] OR  [email or web address] OR  https://www.cdc.gov/steadi/patient.html

## 2021-12-23 NOTE — DISCHARGE NOTE NURSING/CASE MANAGEMENT/SOCIAL WORK - PATIENT PORTAL LINK FT
You can access the FollowMyHealth Patient Portal offered by [facility] by registering at the following website: [email or web address]. By joining [facility]’s FollowMyHealth portal, you will also be able to view your health information using other applications (apps) compatible with our system.

## 2021-12-23 NOTE — PROGRESS NOTE ADULT - SUBJECTIVE AND OBJECTIVE BOX
Patient seen and examined at bedside. Pain well controlled with medication. Feels well and is ready to go to rehab today. Patient denies any numbness, tingling, weakness, or any other orthopaedic complaint.     Vital Signs Last 24 Hrs  T(C): 36.7 (23 Dec 2021 00:47), Max: 36.7 (22 Dec 2021 08:25)  T(F): 98 (23 Dec 2021 00:47), Max: 98 (22 Dec 2021 08:25)  HR: 95 (23 Dec 2021 00:47) (55 - 95)  BP: 107/54 (23 Dec 2021 00:47) (107/54 - 123/83)  BP(mean): --  ABP: --  ABP(mean): --  RR: 17 (23 Dec 2021 00:47) (16 - 17)  SpO2: 96% (23 Dec 2021 00:47) (96% - 96%)    Gen: resting comfortably in bed, NAD  RLE:  Dressing in place over right hip, c/d/i with moderate strikethrough; will change. Skin intact. No edema, erythema, or lesions of the skin.  Brit-incisional TTP; otherwise, NTTP throughout the rest of the extremity.   SILT L2-S1  GSC/TA/EHL/FHL intact; Q/G unable to assess 2/2 pain  pulse palpable.  No calf tenderness bilaterally.  Compartments soft and compressible.                           13.3   11.83 )-----------( 177      ( 22 Dec 2021 08:18 )             40.4   12-22    136  |  105  |  23  ----------------------------<  159<H>  4.1   |  25  |  1.13    Ca    8.7      22 Dec 2021 08:18

## 2021-12-23 NOTE — PROGRESS NOTE ADULT - REASON FOR ADMISSION
S/p mechanical fall

## 2021-12-23 NOTE — PROGRESS NOTE ADULT - SUBJECTIVE AND OBJECTIVE BOX
HPI:  Patient is a 87y old male who presents with a chief complaint of right hip pain s/p fall.    Consulted by Dr. Grider for VTE prophylaxis, risk stratification, and anticoagulation management.    PAST MEDICAL & SURGICAL HISTORY:  Afib  DVT (deep venous thrombosis)  Hyperlipemia  H/O cardiomyopathy  Factor V Leiden  History of appendectomy    Interval History:  : Patient seen at bedside denying any discomfort. Endorses that he takes 1 mg warfarin daily and INR monitored via [facility] and Dr. Loja Advised to eat greens today and tomorrow. When INR less that 2.0 will start UFH until OR : Patient seen at bedside, INR 2.1, possible surgery tomorrow, advised patient to have Vit K foods   : Patient seen at bedside, INR 1.94, pending surgery today  : Patient seen at bedside INR 2.1. Will order FP. He is pleasant and denying any pain. NPO for OR today.  [facility]  Discussed his anticoagulation with coumadin.  Pt sates he has been on it for more then 20years and his normal dose is 1mg daily. Discussed with him his INR of 2.5 and giving him only .5mg tonight.  Will reevaluate tomorrow.    2021 [facility]  c/o of constipation.  Discussed with pt the use of prunes , apple sauce or anything that helps him move his bowels.  Discussed his dosing of coumadin and his INR  of 2.49 today.   21: Patient seen walking in hallway with walker and PT. For rehab today- advised of INR today and warfarin dosing.    BMI: 28.9    CrCl:63.3    Caprini VTE Risk Score:   CAPRINI SCORE  AGE RELATED RISK FACTORS                                                       MOBILITY RELATED FACTORS  [ ] Age 41-60 years                                            (1 Point)                  [ ] Bed rest /restricted mobility                      (1 Point)  [ ] Age: 61-74 years                                           (2 Points)                [ ] Plaster cast                                                   (2 Points)  [x ] Age= 75 years                                              (3 Points)                 [ ] Bed bound for more than 72 hours                   (2 Points)    DISEASE RELATED RISK FACTORS                                               GENDER SPECIFIC FACTORS  [ ] Edema in the lower extremities                       (1 Point)           [ ] Pregnancy                                                            (1 Point)  [ ] Varicose veins                                               (1 Point)                  [ ] Post-partum < 6 weeks                                      (1 Point)             [ x] BMI > 25 Kg/m2                                            (1 Point)                  [ ] Hormonal therapy or oral contraception       (1 Point)                 [ ] Sepsis (in the previous month)                        (1 Point)             [ ] History of pregnancy complications                (1Point)  [ ] Pneumonia or serious lung disease                                             [ ] Unexplained or recurrent  (=/>3), premature                                 (In the previous month)                               (1 Point)                birth with toxemia or growth-restricted infant (1 Point)  [ ] Abnormal pulmonary function test            (1 Point)                                   SURGERY RELATED RISK FACTORS  [ ] Acute myocardial infarction                       (1 Point)                  [ ]  Section                                         (1 Point)  [ ] Congestive heart failure (in the previous month) (1 Point)   [ ] Minor surgery   lasting <45 minutes       (1 Point)   [ ] Inflammatory bowel disease                             (1 Point)          [ ] Arthroscopic surgery                                  (2 Points)  [ ] Central venous access                                    (2 Points)            [ ] General surgery lasting >45 minutes      (2 Points)       [ ] Stroke (in the previous month)                  (5 Points)            [ ] Elective major lower extremity arthroplasty (5 Points)                                   [  ] Malignancy (present or past include skin melanoma                                          but exclude  basal skin cell)    (2 points)                                      TRAUMA RELATED RISK FACTORS                HEMATOLOGY RELATED FACTORS                                  [x ] Fracture of the hip, pelvis, or leg                       (5 Points)  [x ] Prior episodes of VTE                                     (3 Points)          [ ] Acute spinal cord injury (in the previous month)  (5 Points)  [ ] Positive family history for VTE                         (3 Points)       [ ] Paralysis (less than 1 month)                          (5 Points)  [ ] Prothrombin 79448 A                                      (3 Points)         [ ] Multiple Trauma (within 1month)                 (5Points)                                                                                                                                                                [ ] Factor V Leiden                                          (3 Points)                                OTHER RISK FACTORS                          [ ] Lupus anticoagulants                                     (3 Points)                     [ ] BMI > 40                          (1 Point)                                                         [ ] Anticardiolipin antibodies                                (3 Points)                 [ ] Smoking                              (1Point)                                                [ ] High homocysteine in the blood                      (3 Points)                [  ] Diabetes requiring insulin (1point)                         [ ] Other congenital or acquired thrombophilia       (3 Points)          [  ] Chemotherapy                   (1 Point)  [ ] Heparin induced thrombocytopenia                  (3 Points)             [  ] Blood Transfusion                (1 point)                                                                                                             Total Score [12 ]                                                                                                                                                                                                                                                                                                                                                                                                                   [id number] Score: #4    IMPROVE Bleeding Risk Score:4.5      Falls Risk:   High ( x )  Mod (  )  Low (  )      FAMILY HISTORY:  FH: diabetes mellitus    FH: breast cancer      Denies any personal or familial history of clotting or bleeding disorders.    Allergies    penicillin (Unknown)    Intolerances        REVIEW OF SYSTEMS    (  )Fever	        ( x )Constipation	(  )SOB				  (  )Headache   (  )Dysuria  (  )Chills	        (  )Melena	        (  )Dyspnea on exertion (  )Dizziness    (  )Polyuria  (  )Nausea      (  )Hematochezia	(  )Cough                          (  )Syncope      (  )Hematuria  (  )Vomiting   (  )Chest Pain	        (  )Wheezing			  (  )Weakness  (  )Diarrhea    (  )Palpitations	(  )Anorexia			  (  )Myalgia       (  x ) Arthralgia    Pertinent positives in HPI and daily subjective. All other systems negative.    Vital Signs Last 24 Hrs  T(C): 36.7 (21 @ 09:40), Max: 36.7 (21 @ 00:47)  T(F): 98.1 (21 @ 09:40), Max: 98.1 (21 @ 09:40)  HR: 85 (21 @ 09:40) (85 - 95)  BP: 139/60 (21 @ 09:40) (107/54 - 139/60)  BP(mean): --  RR: 17 (21 @ 00:47) (17 - 17)  SpO2: 96% (21 @ 00:47) (96% - 96%)    PHYSICAL EXAM:    Constitutional: Appears Well    Neurological: A& O x 3    Skin: Warm    Respiratory and Thorax: normal effort; Breath sounds: normal; No rales/wheezing/rhonchi  	  Cardiovascular: S1, S2, regular, NMBR	    Gastrointestinal: BS + x 4Q, nontender	    Genitourinary:  Bladder nondistended, nontender    Musculoskeletal:   General Right:   no muscle/joint tenderness,   normal tone, no joint swelling,   ROM: limited	    General Left:   no muscle/joint tenderness,   normal tone, no joint swelling,   ROM: full    Hip:  Right dressing CDI     Lower extrems:   Right: no calf tenderness              negative olivia's sign               + pedal pulses    Left:   no calf tenderness              negative olivia's sign               + pedal pulses                          12.1   11.01 )-----------( 166      ( 23 Dec 2021 08:23 )             37.7           136  |  105  |  19  ----------------------------<  194<H>  4.0   |  27  |  0.97    Ca    8.6      23 Dec 2021 08:23        PT/INR - ( 23 Dec 2021 08:23 )   PT: 26.2 sec;   INR: 2.33 ratio                                          13.3   11.83 )-----------( 177      ( 22 Dec 2021 08:18 )             40.4           136  |  105  |  23  ----------------------------<  159<H>  4.1   |  25  |  1.13    Ca    8.7      22 Dec 2021 08:18       PTT - ( 21 Dec 2021 09:49 )  PTT:35.7 sec             12.4   10.75 )-----------( 148      ( 21 Dec 2021 07:56 )             37.8           138  |  108  |  22  ----------------------------<  203<H>  5.0   |  25  |  0.95    Ca    8.8      21 Dec 2021 07:56     PTT - ( 21 Dec 2021 09:49 )  PTT:35.7 sec                        13.2   9.94  )-----------( 139      ( 20 Dec 2021 07:19 )             40.8           138  |  109<H>  |  25<H>  ----------------------------<  156<H>  4.4   |  24  |  0.92    Ca    8.3<L>      20 Dec 2021 07:19        PT/INR - ( 20 Dec 2021 07:19 )   PT: 23.9 sec;   INR: 2.14 ratio         PTT - ( 20 Dec 2021 07:19 )  PTT:35.4 sec                           13.3   10.15 )-----------( 126      ( 19 Dec 2021 07:20 )             40.6       12    139  |  108  |  25<H>  ----------------------------<  147<H>  4.4   |  27  |  1.02    Ca    8.6      19 Dec 2021 07:20    TPro  x   /  Alb  3.1<L>  /  TBili  x   /  DBili  x   /  AST  x   /  ALT  x   /  AlkPhos  x   12      PT/INR - ( 19 Dec 2021 07:20 )   PT: 22.0 sec;   INR: 1.94 ratio         PTT - ( 19 Dec 2021 07:20 )  PTT:35.1 sec                    13.9   13.14 )-----------( 132      ( 18 Dec 2021 07:23 )             42.3       12    137  |  106  |  28<H>  ----------------------------<  176<H>  4.3   |  25  |  1.13    Ca    8.6      18 Dec 2021 07:23      PT/INR - ( 23 Dec 2021 08:23 )   PT: 26.2 sec;   INR: 2.33 ratio    PT/INR - ( 22 Dec 2021 08:18 )   PT: 27.4 sec;   INR: 2.47 ratio   PT/INR - ( 21 Dec 2021 09:49 )   PT: 28.0 sec;   INR: 2.50 ratio    PT/INR - ( 20 Dec 2021 07:19 )   PT: 23.9 sec;   INR: 2.14 ratio    PT/INR - ( 19 Dec 2021 07:20 )   PT: 22.0 sec;   INR: 1.94 ratio    PT/INR - ( 18 Dec 2021 07:23 )   PT: 23.7 sec;   INR: 2.12 ratio    PT/INR - ( 17 Dec 2021 07:59 )   PT: 27.5 sec;   INR: 2.45 ratio      MEDICATIONS  (STANDING):  acetaminophen     Tablet .. 650 milliGRAM(s) Oral every 6 hours  ascorbic acid 500 milliGRAM(s) Oral two times a day  aspirin enteric coated 81 milliGRAM(s) Oral daily  atorvastatin 40 milliGRAM(s) Oral at bedtime  carvedilol 12.5 milliGRAM(s) Oral every 12 hours  digoxin     Tablet 125 MICROGram(s) Oral daily  enalapril 5 milliGRAM(s) Oral daily  folic acid 1 milliGRAM(s) Oral daily  lactated ringers. 1000 milliLiter(s) IV Continuous <Continuous>  lactated ringers. 1000 milliLiter(s) IV Continuous <Continuous>  multivitamin 1 Tablet(s) Oral daily  pantoprazole    Tablet 40 milliGRAM(s) Oral before breakfast  polyethylene glycol 3350 17 Gram(s) Oral at bedtime  polyethylene glycol 3350 17 Gram(s) Oral daily  senna 2 Tablet(s) Oral at bedtime  tranexamic acid IVPB 2000 milliGRAM(s) IV Intermittent once  warfarin 1 milliGRAM(s) Oral daily    **Current DVT Prophylaxis:    LMWH                   (  )  Heparin SQ           (  )  Coumadin             (x  )  Xarelto                  (  )  Eliquis                   (  )  Venodynes           (  x)  Ambulation          ( x )  UFH                       ( )  ECASA                   (  )  Contraindicated  (  )

## 2021-12-23 NOTE — PROGRESS NOTE ADULT - PROVIDER SPECIALTY LIST ADULT
Anticoag Management
Anticoag Management
Orthopedics
Orthopedics
Hospitalist
Orthopedics
Anticoag Management
Hospitalist
Orthopedics

## 2021-12-30 DIAGNOSIS — E78.5 HYPERLIPIDEMIA, UNSPECIFIED: ICD-10-CM

## 2021-12-30 DIAGNOSIS — G47.33 OBSTRUCTIVE SLEEP APNEA (ADULT) (PEDIATRIC): ICD-10-CM

## 2021-12-30 DIAGNOSIS — D62 ACUTE POSTHEMORRHAGIC ANEMIA: ICD-10-CM
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
## 2021-12-30 DIAGNOSIS — Z86.718 PERSONAL HISTORY OF OTHER VENOUS THROMBOSIS AND EMBOLISM: ICD-10-CM

## 2021-12-30 DIAGNOSIS — Z88.0 ALLERGY STATUS TO PENICILLIN: ICD-10-CM

## 2021-12-30 DIAGNOSIS — Z79.01 LONG TERM (CURRENT) USE OF ANTICOAGULANTS: ICD-10-CM

## 2021-12-30 DIAGNOSIS — R79.1 ABNORMAL COAGULATION PROFILE: ICD-10-CM

## 2021-12-30 DIAGNOSIS — I48.20 CHRONIC ATRIAL FIBRILLATION, UNSPECIFIED: ICD-10-CM

## 2021-12-30 DIAGNOSIS — D68.2 HEREDITARY DEFICIENCY OF OTHER CLOTTING FACTORS: ICD-10-CM

## 2021-12-30 DIAGNOSIS — T45.515A ADVERSE EFFECT OF ANTICOAGULANTS, INITIAL ENCOUNTER: ICD-10-CM

## 2021-12-30 DIAGNOSIS — S72.051A UNSPECIFIED FRACTURE OF HEAD OF RIGHT FEMUR, INITIAL ENCOUNTER FOR CLOSED FRACTURE: ICD-10-CM

## 2021-12-30 DIAGNOSIS — I42.9 CARDIOMYOPATHY, UNSPECIFIED: ICD-10-CM

## 2021-12-30 DIAGNOSIS — I25.10 ATHEROSCLEROTIC HEART DISEASE OF NATIVE CORONARY ARTERY WITHOUT ANGINA PECTORIS: ICD-10-CM
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
## 2022-01-01 NOTE — H&P ADULT - NSTOBACCOSCREENHP_GEN_A_CS
Your Child's Health  Two-Month-Old Visit                                                                    Suzette Aguillon  2022    There were no vitals taken for this visit.  Weight:       Your Child's 2 Month-Old Visit                                                       Key points at this age…    Car seat safety is critical! Make sure your baby is safely and properly riding in a rear-facing car seat.    Your baby continues to be safest when sleeping on their back in their own bed (a crib, bassinette or Pack-n-Play) at this age.     It’s normal to have periods of feeling exhausted and overwhelmed with a baby this age. Make sure to ask for help if you experiencing this sort of thing.     NUTRITION: If you are breastfeeding, keep it up! Breast milk or formula will provide all the nutrition your baby needs at this age, except for vitamin D. You should continue to give your baby a vitamin supplement (either a multivitamin or just pure vitamin D) every day if you are breastfeeding. (If you are feeding your baby formula at this age, they are likely getting enough vitamin D from the formula; supplementation with vitamin D is not necessary once babies are taking about 30 ounces of formula daily.)     DEVELOPMENT:  Most babies at this age will smile responsively and  and babble back at you when you talk to them. They will discover their hands and develop stronger neck muscles (which is why tummy time is still important--give your baby tummy time every day!). Talk, read and sing to your baby--hearing your voice often is very beneficial for your child's development (even at this young age!).     EMOTIONAL HEALTH:  Having a  is a joyous and exhausting experience. It is normal to be tired, but be aware that sometimes new moms can experience a true post-partum depression. This is different from “baby blues” which describes moms who are feeling overwhelmed and fatigued for short periods of time.  These feelings shouldn’t last for a long time, and these moms generally know that they love their baby and are looking forward to watching them grow. Post-partum depression, on the other hand, describes moms who are not just exhausted but feel hopeless about the future and are not excited about caring for their baby (and maybe not even able to fully care for their baby). Talk to your doctor if think you are experiencing more than just some “baby blues”. It can be helped, and if it goes on too long, it can have negative effects on you and your baby.     Try to find time to spend alone with your partner, even if it is brief. Likewise, try to spend some brief one-on-one time with your other children each day. Try to find simple, safe tasks that they can do to feel like they are helping.     Having regular routines (as much as possible) for feeding, bathing, etc. may help your household run a little more smoothly and be a little less stressful.     CAR SAFETY: Syair should be secured in a rear-facing infant car safety seat whenever riding in the car for the next several months (until they outgrow the upper weight limits of the seat). This will protect your baby in case of an accident, plus it is the law. If you are shopping for a new car seat or just want to learn more about them, www.safercar.gov is a helpful website, as is the [place] DOT website (search for “car seats”). You can also search for local inspection stations at www.safercar.org or http://www.[employer].com or at www.safekidswi.org. Studies show that car seats are installed incorrectly more often than they are installed correctly, so utilizing a certified car seat  can ensure your baby is as safely secured as possible. Remember that the seat straps need to be very snug to protect your baby in case of an accident (so no thick coats or snowsuits while riding in the car during the winter!).     SAFE SLEEPING: The safest place for a baby to sleep at this  age is in their own crib in their parent’s room. Syair should always be placed on their back to sleep (not on their tummy or their side). This “back to sleep” position decreases the risk of crib death (SIDS). Continue to avoid loose, soft bedding (blankets, comforters, sheepskins, quilts, pillows, pillow-like bumper pads) and soft toys in the baby’s crib because they are a risk for suffocation (and SIDS). “Sleep sacks” and swaddling with thin blankets are okay. Cribs (including Pack-n-Plays) should be certified by [facility] (a safety agency for baby products). Safety criteria for cribs include: slats or bars no more than 2-3/8 inches (6cm) apart, a snug fitting mattress, and a distance of no less than 26 inches from the mattress to the top rail. If your crib has a drop-down side rail, it should always be kept all the way up and locked in place.     If you are frustrated with or worried about your baby’s sleeping, the website “The Period of PURPLE Crying” (search “purple crying”) has helpful (and reassuring) information about infant sleep and crying patterns.    PACIFIER USE: Pacifier use during sleep offers some protection against crib death (SIDS). There are safety standards for pacifiers; you can check at saferproducts.gov or kidsindanger.org to make sure the pacifier you choose has not had any safety recalls. Clean the pacifier often, and never coat it with anything sweet (honey or sugar). Offer it to your baby as they are going off to sleep, and if your child spits it out, don’t put it back in their mouth. And never attach it with a cord or string around your baby’s neck or wrist--this can cause dangerous strangulation injuries.     SAFETY:  1.   Falls:  Babies can start rolling at this age, so be careful not to leave Syair alone on a surface they could roll off of. (If you have to walk away or put your baby down suddenly, the safest place might be on the floor!)     2.   Scalding:  Adjust your hot-water heater to  120-125°F, or use the “low” setting. This will decrease the risk of scald burns (and save money on your utility bills!). It is also important to not carry anything hot (hot drinks, soup, pans) when holding your baby. As Suzette is getting more active, they are more likely to grab at what you are holding and cause a (dangerous!) spill.     3.   Walkers, Swings and Other Things:  Do not plan to buy a walker for your baby. They cause a lot of accidents, and they do not help your baby walk any sooner. Other options (stationary \"saucers\", bouncers or jumpers) may be a better choice when your baby is older. Swings may be appropriate for your baby at this age if you choose one for which you baby meets the recommended size (weight, length) criteria. You should always follow the  guidelines for use and never leave your baby unsupervised in a swing. Also, swings are NOT considered a safe sleeping place for babies.     4.   Toys:  Choose toys that have been approved for Sy’s age and that encourage the development of appropriate motor skills (reaching, batting, grabbing). Make sure that other children in the home are not offering the baby their toys which may be inappropriate (e.g., small pieces that could cause choking, balloons, plastic bags).     5. Water Safety: Don't ever leave your baby alone in a bath or pool, even if they are secured in a seat.     6.   No smoking! Exposure to tobacco smoke puts children at risk for lots of problems (asthma and other severe breathing problems, crib death [SIDS], ear infections and even behavior and learning problems). Consider talking to your doctor about quitting smoking. If you can’t quit, keep all smoking outside the home (not just in another room), and all smokers should change their clothes and wash their hands before handling the baby.      & BABYSITTING: Returning to work is a reality for many moms. If you need some guidance choosing a , the [place]  Department of Children and Families offers tips to help you choose a childcare center. Go to their website and look under “Family Resources” for information.     If you are not planning a return to work, you still may feel ready for a break sometime. When preparing to leave your baby with a sitter, be sure to let them know where you will be going and leave a working phone number where you can be reached.  Make sure that whoever you are leaving your child with knows how to contact emergency resources if needed (911, Poison Control 1-672.195.4725). The [employer] offers training courses to prepare babysitters to provide safe and responsible care for children.     MEDICATION FOR FEVER OR PAIN:   Acetaminophen liquid (e.g., Tylenol or Tempra or store brands) may be given as often every four hours if needed for pain or fever. (Remember this is ONLY a pain and fever medicine. It will not help cold symptoms, congestion, cough, etc.--ONLY give it for pain or fever.) (Ibuprofen should not be given until 6 months of age.)    Child’s Weight: Dose:  06 - 11 pounds:   40 mg (1.25 mL which is the same as 1/4 teaspoon)  12 - 17 pounds:   80 mg (2.5 mL which is the same as 1/2 teaspoon)    If Suzette is outside these weight ranges, call the office for dosing advice.    (Any acetaminophen product--whether it is labelled as for “INFANTS” or “CHILDREN”-- should have a concentration of 160mg/5ml. Check the label to make sure the product you are using has 160mg/5ml; if it does not, call the clinic for dosing instructions.)      Most Recent Immunizations   Administered Date(s) Administered    Hep B, Unspecified Formulation 2022    Hep B, adolescent or pediatric 2022         If uSzette received any immunizations today and they develop any of the following reactions within 72 hours after an immunization, call our clinic or the after hours pediatric phone nurse:  1.   A temperature of 105 degrees or above.  2.   More than 3 hours of  continuous crying.  3.   A shrill, high-pitched cry.  4.   A seizure or a fainting spell.  In this case, you should call 911 or proceed to the emergency room.    NEXT VISIT:  FOUR MONTHS OF AGE      Thank you for entrusting your care to [facility]!    Also, check out “Children’s Health” on the [facility] Blog for updates on timely topics regarding children’s health     No

## 2022-01-07 LAB
INR PPP: 5.16
PT BLD: 56.3

## 2022-01-10 ENCOUNTER — TRANSCRIPTION ENCOUNTER (OUTPATIENT)
Age: 87
End: 2022-01-10

## 2022-01-10 ENCOUNTER — NON-APPOINTMENT (OUTPATIENT)
Age: 87
End: 2022-01-10

## 2022-01-13 ENCOUNTER — NON-APPOINTMENT (OUTPATIENT)
Age: 87
End: 2022-01-13

## 2022-01-13 DIAGNOSIS — Z01.810 ENCOUNTER FOR PREPROCEDURAL CARDIOVASCULAR EXAMINATION: ICD-10-CM

## 2022-01-14 ENCOUNTER — OUTPATIENT (OUTPATIENT)
Dept: OUTPATIENT SERVICES | Facility: HOSPITAL | Age: 87
LOS: 1 days | Discharge: HOME | End: 2022-01-14

## 2022-01-14 DIAGNOSIS — Z90.49 ACQUIRED ABSENCE OF OTHER SPECIFIED PARTS OF DIGESTIVE TRACT: Chronic | ICD-10-CM

## 2022-01-14 DIAGNOSIS — I26.99 OTHER PULMONARY EMBOLISM WITHOUT ACUTE COR PULMONALE: ICD-10-CM

## 2022-01-14 LAB
INR PPP: 5.36
INR PPP: 5.49
INR PPP: 6.69
PROTHROMBIN TIME COMMENT: NORMAL
PT BLD: 58.9
PT BLD: 60.2
PT BLD: 7.27

## 2022-01-17 NOTE — HISTORY OF PRESENT ILLNESS
[de-identified] : 88 y/o male with hx of recurrent LE DVT and heterozygous Factor V leiden ,   A fib  on indefinite AC-  Coumadin  for almost 20 years.\par very sensitive to warfarin- had therapeutic INR with ~ 1-2 mg daily dose and stable.\par \par recent Hospitalization ( [facility]) 12/16- 12/23/21 ) s/p mechanical fall R hip Fx s/p R hip hemiarthroplasty; discharged to  Continued Coumadin.

## 2022-01-17 NOTE — REASON FOR VISIT
[Medical Office: ([facility])___] : at the medical office located in  [Verbal consent obtained from patient] : the patient, [unfilled]

## 2022-01-18 ENCOUNTER — APPOINTMENT (OUTPATIENT)
Dept: HEMATOLOGY ONCOLOGY | Facility: CLINIC | Age: 87
End: 2022-01-18
Payer: MEDICARE

## 2022-01-18 ENCOUNTER — NON-APPOINTMENT (OUTPATIENT)
Age: 87
End: 2022-01-18

## 2022-01-18 ENCOUNTER — APPOINTMENT (OUTPATIENT)
Dept: HEMATOLOGY ONCOLOGY | Facility: CLINIC | Age: 87
End: 2022-01-18

## 2022-01-18 DIAGNOSIS — D68.32 HEMORRHAGIC DISORDER DUE TO EXTRINSIC CIRCULATING ANTICOAGULANTS: ICD-10-CM

## 2022-01-18 DIAGNOSIS — T45.515A HEMORRHAGIC DISORDER DUE TO EXTRINSIC CIRCULATING ANTICOAGULANTS: ICD-10-CM

## 2022-01-18 DIAGNOSIS — Z79.01 LONG TERM (CURRENT) USE OF ANTICOAGULANTS: ICD-10-CM

## 2022-01-18 PROCEDURE — 99442: CPT | Mod: 95

## 2022-01-18 NOTE — REASON FOR VISIT
[Home] : at home, [unfilled] , at the time of the visit. [Medical Office: ([facility])___] : at the medical office located in  [Spouse] : spouse [Verbal consent obtained from patient] : the patient, [unfilled] [Follow-Up Visit] : a follow-up visit for [FreeTextEntry2] : elevated INR

## 2022-01-18 NOTE — HISTORY OF PRESENT ILLNESS
[de-identified] : 88 y/o male with hx of recurrent LE DVT and heterozygous Factor V leiden ,   A fib  on indefinite AC-  Coumadin  for almost 20 years.\par very sensitive to warfarin- had therapeutic INR with ~ 1-2 mg daily dose and stable.\par \par recent Hospitalization ( HH) 12/16- 12/23/21 ) s/p mechanical fall R hip Fx s/p R hip hemiarthroplasty; discharged to  [facility] , now home. Slow recovery, he was having diarrhea in rehab, rashes, did not eat well. now home for few days- feels better, eating well. has PT.\par No excessive bruising or bleeding.\par \par INR monitored by his cardiologist- continues to be elevated ~ 5-6 despite being off Coumadin.

## 2022-01-18 NOTE — RESULTS/DATA
[FreeTextEntry1] : reviewed his recent results   1/13  INR 5.36  ( was > 6 on 1/12)  Mixing studies for PT- INR corrected with 50/50 mix  factor VII activity 10 %   Alb 2.8

## 2022-01-18 NOTE — ASSESSMENT
[FreeTextEntry1] : 86 y/o male with hx of recurrent DVt and Afib on long term Ac with warfarin for > 20 years. At baseline he is very sensitive to warfarin- had therapeutic INR with stable dose of Warfarin of 1 - 1.5 mg daily in the past. Currently monitored by cardiology. \par Recent hospitalization for hip Fx, surgery, next rehab  with some complications, had diarrhea ( resolved).\par Component of malnutrition - he was not eating well in rehab, alb down to 2.3.\par \par Warfarin coagulopathy  in patient on chronic AC but now with malnutrition, possible some degree of vit K deficiency.\par INR < 10, no bleeding- no need for vit K supplementation.\par His INR should start to trend slowly down- resume warfarin when INR < 2 . \par His nutritional status is improving.\par \par He will follow up with cardiology, return here PRN.

## 2022-01-19 LAB
INR PPP: 1.35
PROTHROMBIN TIME COMMENT: NORMAL
PT BLD: 15.7

## 2022-01-25 LAB
INR PPP: 1.14
PROTHROMBIN TIME COMMENT: NORMAL
PT BLD: 13.4

## 2022-01-25 RX ORDER — ENALAPRIL MALEATE 2.5 MG/1
2.5 TABLET ORAL
Qty: 90 | Refills: 3 | Status: DISCONTINUED | COMMUNITY
Start: 2021-11-08 | End: 2022-01-25

## 2022-02-01 LAB
INR PPP: 1.12
PT BLD: 13.2

## 2022-02-08 LAB
INR PPP: 1.22
PROTHROMBIN TIME COMMENT: NORMAL
PT BLD: 14.4

## 2022-02-09 ENCOUNTER — TRANSCRIPTION ENCOUNTER (OUTPATIENT)
Age: 87
End: 2022-02-09

## 2022-02-11 LAB
INR PPP: 1.25
PROTHROMBIN TIME COMMENT: NORMAL
PT BLD: 14.7

## 2022-02-17 LAB
INR PPP: 1.8
PROTHROMBIN TIME COMMENT: NORMAL
PT BLD: 20.7

## 2022-02-24 LAB
INR PPP: 2.77
PROTHROMBIN TIME COMMENT: NORMAL
PT BLD: 32.8

## 2022-02-25 ENCOUNTER — APPOINTMENT (OUTPATIENT)
Dept: CARDIOLOGY | Facility: CLINIC | Age: 87
End: 2022-02-25
Payer: MEDICARE

## 2022-02-25 ENCOUNTER — NON-APPOINTMENT (OUTPATIENT)
Age: 87
End: 2022-02-25

## 2022-02-25 VITALS
WEIGHT: 213.84 LBS | OXYGEN SATURATION: 98 % | BODY MASS INDEX: 28.34 KG/M2 | HEART RATE: 97 BPM | DIASTOLIC BLOOD PRESSURE: 70 MMHG | HEIGHT: 73 IN | SYSTOLIC BLOOD PRESSURE: 126 MMHG

## 2022-02-25 PROCEDURE — 99214 OFFICE O/P EST MOD 30 MIN: CPT | Mod: 25

## 2022-02-25 PROCEDURE — 93000 ELECTROCARDIOGRAM COMPLETE: CPT

## 2022-02-25 NOTE — REASON FOR VISIT
[Follow-Up - Clinic] : a clinic follow-up of [Anticoagulation] : anticoagulation [Aortic Stenosis] : aortic stenosis [Coronary Artery Disease] : coronary artery disease [Medication Management] : Medication management [Arrhythmia/ECG Abnorrmalities] : arrhythmia/ECG abnormalities [Hyperlipidemia] : hyperlipidemia [Hypertension] : hypertension

## 2022-02-28 RX ORDER — ASPIRIN 81 MG/1
81 TABLET, COATED ORAL DAILY
Refills: 0 | Status: ACTIVE | COMMUNITY
Start: 2022-02-28

## 2022-02-28 NOTE — DISCUSSION/SUMMARY
[FreeTextEntry1] : S/P  mechanical fall/ RT hip fracture S/P Right hemiarthroplasty; post op course complicated with renal insufficiency/Anemia elevated creat and low Iron levels ( Followed with PCP ) additionally, developed drug rash 2/2  Pantoprazole \par Has completed rehabilitation and currently receiving Physical therapy\par Here today for Blood pressure check at request of PCP 2/2 " low BP " Enalapril suspended; pressure currently normotensive 126/70 patient claims to be feeling well no CP/SOB/Palpitations \par \par HTN: Adequately controlled on current medications will remain off Enalapril for now and continue with daily Home BP log with F/U OV 2 weeks, labs ordered \par \par CAD: Single vessel CAD moderate LAD disease ( Mild RCA disease )  NL LV FX continue baby ASA/statin \par \par AO 3.8 CM, Ascending 4.1 CM: Monitor periodically US Aorta ordered \par \par Valvular Heart disease: F/U Echo ordered \par \par AF: INR therapeutic, AF rate controlled will continue Coumadin 2 MG daily repeat INR Tuesday 3/1/22\par \par DM: Followed with Endocrinologist  \par \par Hyperlipidemia: Continue statin therapy.  LDL goal of 70 \par \par Plan DW patient

## 2022-02-28 NOTE — PHYSICAL EXAM
[Normal Conjunctiva] : normal conjunctiva [Normal S1, S2] : normal S1, S2 [Soft] : abdomen soft [Non Tender] : non-tender [Normal Gait] : normal gait [No Edema] : no edema [Normal] : moves all extremities, no focal deficits, normal speech [Alert and Oriented] : alert and oriented

## 2022-02-28 NOTE — HISTORY OF PRESENT ILLNESS
[FreeTextEntry1] : 86 Y/O Gentleman PMH:  DM, CAD, AF/recurrent LE DVT and Factor V Leiden on Coumadin, CM, HTN, Hyperlipidemia, ADRY treatment currently on hold, peripheral neuropathy, S/P  mechanical fall with RT hip fracture now S/P Right hemiarthroplasty 2/2 fracture post op course complicated with renal insufficiency/Anemia and low Iron levels followed with PCP,  additionally, developed drug rash 2/2  Pantoprazole \par Has completed rehabilitation and currently receiving Physical therapy \par Here today for Blood pressure check at request of PCP 2/2 " low BP " Enalapril suspended; pressure currently normotensive 126/70 patient claims to be feeling well no CP/SOB/Palpitations \par \par Cardiac Cath 2/14/20 Single vessel CAD Moderate LAD disease \par Echo 3/17/21 NL LV SYS FX EF 60% Moderate LVH, Mild MR moderate AS/Mild AI, mild Pulmonary HTN, AO 3.8 CM, Ascending 4.1 CM\par Carotid US 2018 B/L carotid plaque < 50% stenosis \par \par

## 2022-03-02 ENCOUNTER — NON-APPOINTMENT (OUTPATIENT)
Age: 87
End: 2022-03-02

## 2022-03-02 LAB
INR PPP: 3.85
PROTHROMBIN TIME COMMENT: NORMAL
PT BLD: 46.1

## 2022-03-16 LAB
INR PPP: 2.57
PROTHROMBIN TIME COMMENT: NORMAL
PT BLD: 30.1

## 2022-03-30 LAB
INR PPP: 2.21
PROTHROMBIN TIME COMMENT: NORMAL
PT BLD: 25.8

## 2022-03-31 ENCOUNTER — APPOINTMENT (OUTPATIENT)
Dept: NEUROLOGY | Facility: CLINIC | Age: 87
End: 2022-03-31
Payer: MEDICARE

## 2022-03-31 VITALS
HEART RATE: 55 BPM | BODY MASS INDEX: 28.23 KG/M2 | WEIGHT: 213 LBS | HEIGHT: 73 IN | SYSTOLIC BLOOD PRESSURE: 102 MMHG | TEMPERATURE: 97.8 F | DIASTOLIC BLOOD PRESSURE: 61 MMHG

## 2022-03-31 PROCEDURE — 99215 OFFICE O/P EST HI 40 MIN: CPT

## 2022-03-31 NOTE — REASON FOR VISIT
[Follow-Up: _____] : a [unfilled] follow-up visit [Spouse] : spouse [FreeTextEntry1] : Follow up for Pt with Recent hospital admission for Fall and Fx Hip / Seen in past for Gait instability with unsteady gait and imbalance

## 2022-03-31 NOTE — DISCUSSION/SUMMARY
[FreeTextEntry1] : 87-year-old patient with hypertension, chronic it'll fibrillation, hyperlipidemia, type 2 diabetes with neuropathy, frequent falls with recent fall with fractured hip surgical repair still going for physical therapy.\par Unsteadiness imbalance from neuropathy.\par Recommend patient to continue PT OT as outpatient.\par Compression stockings for lower extremity swelling.\par Recommend using walker or cane for fall prevention.\par Patient education provided discuss the patient and his wife.\par Get copies of medical records from [facility] including CT scan reports.\par Followup evaluation with 6 months with one of the other providers.\par Followup with you and cardiology, endocrinology and hematology.

## 2022-03-31 NOTE — REVIEW OF SYSTEMS
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

## 2022-03-31 NOTE — PHYSICAL EXAM
[General Appearance - Alert] : alert [General Appearance - In No Acute Distress] : in no acute distress [Oriented To Time, Place, And Person] : oriented to person, place, and time [Impaired Insight] : insight and judgment were intact [Affect] : the affect was normal [Person] : oriented to person [Place] : oriented to place [Time] : oriented to time [Concentration Intact] : normal concentrating ability [Visual Intact] : visual attention was ~T not ~L decreased [Naming Objects] : no difficulty naming common objects [Repeating Phrases] : no difficulty repeating a phrase [Writing A Sentence] : no difficulty writing a sentence [Fluency] : fluency intact [Comprehension] : comprehension intact [Reading] : reading intact [Past History] : adequate knowledge of personal past history [Cranial Nerves Optic (II)] : visual acuity intact bilaterally,  visual fields full to confrontation, pupils equal round and reactive to light [Cranial Nerves Oculomotor (III)] : extraocular motion intact [Cranial Nerves Trigeminal (V)] : facial sensation intact symmetrically [Cranial Nerves Facial (VII)] : face symmetrical [Cranial Nerves Vestibulocochlear (VIII)] : hearing was intact bilaterally [Cranial Nerves Accessory (XI - Cranial And Spinal)] : head turning and shoulder shrug symmetric [Cranial Nerves Glossopharyngeal (IX)] : tongue and palate midline [Cranial Nerves Hypoglossal (XII)] : there was no tongue deviation with protrusion [Motor Strength] : muscle strength was normal in all four extremities [No Muscle Atrophy] : normal bulk in all four extremities [Sensation Tactile Decrease] : light touch was intact [Romberg's Sign] : a positive Romberg's sign was present [Limited Balance] : the patient's balance was impaired [1+] : Patella left 1+ [0] : Ankle jerk left 0 [Sclera] : the sclera and conjunctiva were normal [PERRL With Normal Accommodation] : pupils were equal in size, round, reactive to light, with normal accommodation [Extraocular Movements] : extraocular movements were intact [Outer Ear] : the ears and nose were normal in appearance [Oropharynx] : the oropharynx was normal [Neck Appearance] : the appearance of the neck was normal [Neck Cervical Mass (___cm)] : no neck mass was observed [Jugular Venous Distention Increased] : there was no jugular-venous distention [Thyroid Diffuse Enlargement] : the thyroid was not enlarged [Thyroid Nodule] : there were no palpable thyroid nodules [Auscultation Breath Sounds / Voice Sounds] : lungs were clear to auscultation bilaterally [Heart Sounds] : normal S1 and S2 [Heart Sounds Gallop] : no gallops [Murmurs] : no murmurs [Heart Sounds Pericardial Friction Rub] : no pericardial rub [Full Pulse] : the pedal pulses are present [Bowel Sounds] : normal bowel sounds [Abdomen Soft] : soft [Abdomen Tenderness] : non-tender [Abdomen Mass (___ Cm)] : no abdominal mass palpated [No CVA Tenderness] : no ~M costovertebral angle tenderness [No Spinal Tenderness] : no spinal tenderness [Nail Clubbing] : no clubbing  or cyanosis of the fingernails [Musculoskeletal - Swelling] : no joint swelling seen [Motor Tone] : muscle strength and tone were normal [Skin Color & Pigmentation] : normal skin color and pigmentation [Skin Turgor] : normal skin turgor [] : no rash [Past-pointing] : there was no past-pointing [Tremor] : no tremor present [Plantar Reflex Right Only] : normal on the right [Plantar Reflex Left Only] : normal on the left [___] : absent on the right [___] : absent on the left [FreeTextEntry7] : Distal vibration loss [FreeTextEntry8] : Unsteady gait using walker [FreeTextEntry1] : Unsteady and unable to tandem walk/ using walker

## 2022-03-31 NOTE — HISTORY OF PRESENT ILLNESS
[FreeTextEntry1] : He is 87-year-old patient coming here for followup evaluation for gait imbalance and unsteadiness with underlying peripheral neuropathy from diabetes and chronic atrial fibrillation not seen since last February coming here after a recent fall and admitted to [facility] with a fractured hip underwent hip hemiarthroplasty in December and went to rehabilitation developed C. difficile infection and recovering.\par Completing physical therapy currently ambulating with a walker. Coming for a followup evaluation today.\par As per patient's wife no new complaints neurologically except unsteadiness and imbalance persisting. Patient wants to walk with a cane but PT still recommending use of walker.\par Patient sometimes forgets and wants to drive and walk without walker.\par No other recurrent falls noted.\par Recent labs improved. CT scan done in our hospital no reports available.

## 2022-04-05 ENCOUNTER — APPOINTMENT (OUTPATIENT)
Dept: CARDIOLOGY | Facility: CLINIC | Age: 87
End: 2022-04-05
Payer: MEDICARE

## 2022-04-05 PROCEDURE — 93306 TTE W/DOPPLER COMPLETE: CPT

## 2022-04-05 PROCEDURE — 93978 VASCULAR STUDY: CPT

## 2022-04-07 ENCOUNTER — NON-APPOINTMENT (OUTPATIENT)
Age: 87
End: 2022-04-07

## 2022-04-07 ENCOUNTER — APPOINTMENT (OUTPATIENT)
Dept: CARDIOLOGY | Facility: CLINIC | Age: 87
End: 2022-04-07
Payer: MEDICARE

## 2022-04-07 VITALS
DIASTOLIC BLOOD PRESSURE: 64 MMHG | WEIGHT: 212 LBS | HEIGHT: 73 IN | HEART RATE: 86 BPM | SYSTOLIC BLOOD PRESSURE: 106 MMHG | OXYGEN SATURATION: 97 % | BODY MASS INDEX: 28.1 KG/M2

## 2022-04-07 LAB
INR PPP: 1.8
PT BLD: 21.6

## 2022-04-07 PROCEDURE — 99214 OFFICE O/P EST MOD 30 MIN: CPT

## 2022-04-07 PROCEDURE — 93000 ELECTROCARDIOGRAM COMPLETE: CPT

## 2022-04-07 NOTE — REASON FOR VISIT
[Symptom and Test Evaluation] : symptom and test evaluation [Arrhythmia/ECG Abnorrmalities] : arrhythmia/ECG abnormalities [Hyperlipidemia] : hyperlipidemia [Follow-Up - Clinic] : a clinic follow-up of [Anticoagulation] : anticoagulation [Aortic Stenosis] : aortic stenosis [Coronary Artery Disease] : coronary artery disease [Hypertension] : hypertension [Medication Management] : Medication management

## 2022-04-07 NOTE — HISTORY OF PRESENT ILLNESS
[FreeTextEntry1] : 86 Y/O Gentleman PMH:  DM, CAD, AF/recurrent LE DVT and Factor V Leiden on Coumadin, CM, HTN, Hyperlipidemia, ADRY treatment currently on hold, peripheral neuropathy, S/P  mechanical fall with RT hip fracture now S/P Right hemiarthroplasty 2/2 fracture post op course complicated with renal insufficiency/Anemia and low Iron levels followed with PCP,  additionally, developed drug rash 2/2  Pantoprazole \par Has completed rehabilitation and currently receiving Physical therapy as an opt\par \par \par Cardiac Cath 2/14/20 Single vessel CAD Moderate LAD disease \par Echo 3/17/21 NL LV SYS FX EF 60% Moderate LVH, Mild MR moderate AS/Mild AI, mild Pulmonary HTN, AO 3.8 CM, Ascending 4.1 CM\par Carotid US 2018 B/L carotid plaque < 50% stenosis \par Pt will continue to increase activity as tolerated. \par \par

## 2022-04-07 NOTE — DISCUSSION/SUMMARY
[FreeTextEntry1] : S/P  mechanical fall/ RT hip fracture S/P Right hemiarthroplasty; post op course complicated with renal insufficiency/Anemia elevated creat and low Iron levels ( Followed with PCP ) additionally, developed drug rash 2/2  Pantoprazole \par Has completed rehabilitation and currently receiving Physical therapy\par \par \par HTN: Adequately controlled on current medications will remain off Enalapril for now and continue with daily Pt was encouraged to maintain blood pressure log\par CAD: Single vessel CAD moderate LAD disease ( Mild RCA disease )  NL LV FX continue baby ASA/statin \par \par AO 3.8 CM, Ascending 4.1 CM: Monitor periodically US Aorta ordered \par \par Valvular Heart disease:Echo reviewed\par \par AF: INR subtherapeutic today. \par DM: Followed with Endocrinologist  \par \par Hyperlipidemia: Continue statin therapy.  LDL goal of 70 \par \par ECG done to evaluate arrhythmia. Pt will follow up in 6 months.

## 2022-04-12 ENCOUNTER — NON-APPOINTMENT (OUTPATIENT)
Age: 87
End: 2022-04-12

## 2022-04-14 LAB
INR PPP: 2.08
PROTHROMBIN TIME COMMENT: NORMAL
PT BLD: 24.5

## 2022-05-02 LAB
INR PPP: 2.33
PROTHROMBIN TIME COMMENT: NORMAL
PT BLD: 27.3

## 2022-05-13 LAB
INR PPP: 1.9
PROTHROMBIN TIME COMMENT: NORMAL
PT BLD: 22.4

## 2022-05-31 LAB
INR PPP: 2.19
PROTHROMBIN TIME COMMENT: NORMAL
PT BLD: 26.1

## 2022-06-10 LAB
INR PPP: 2.48
PROTHROMBIN TIME COMMENT: NORMAL
PT BLD: 29

## 2022-06-24 LAB
INR PPP: 2.04
PROTHROMBIN TIME COMMENT: NORMAL
PT BLD: 23.8

## 2022-07-10 ENCOUNTER — INPATIENT (INPATIENT)
Facility: HOSPITAL | Age: 87
LOS: 3 days | Discharge: HOME HEALTH | DRG: 189 | End: 2022-07-14
Attending: FAMILY MEDICINE | Admitting: FAMILY MEDICINE
Payer: MEDICARE

## 2022-07-10 VITALS — WEIGHT: 212.08 LBS | HEIGHT: 74 IN

## 2022-07-10 DIAGNOSIS — Z90.49 ACQUIRED ABSENCE OF OTHER SPECIFIED PARTS OF DIGESTIVE TRACT: Chronic | ICD-10-CM

## 2022-07-10 DIAGNOSIS — J18.9 PNEUMONIA, UNSPECIFIED ORGANISM: ICD-10-CM

## 2022-07-10 LAB
ADD ON TEST-SPECIMEN IN LAB: SIGNIFICANT CHANGE UP
ALBUMIN SERPL ELPH-MCNC: 3.5 G/DL — SIGNIFICANT CHANGE UP (ref 3.3–5)
ALP SERPL-CCNC: 58 U/L — SIGNIFICANT CHANGE UP (ref 40–120)
ALT FLD-CCNC: 36 U/L — SIGNIFICANT CHANGE UP (ref 12–78)
ANION GAP SERPL CALC-SCNC: 5 MMOL/L — SIGNIFICANT CHANGE UP (ref 5–17)
APPEARANCE UR: CLEAR — SIGNIFICANT CHANGE UP
APTT BLD: 34.9 SEC — SIGNIFICANT CHANGE UP (ref 27.5–35.5)
AST SERPL-CCNC: 31 U/L — SIGNIFICANT CHANGE UP (ref 15–37)
BASOPHILS # BLD AUTO: 0.01 K/UL — SIGNIFICANT CHANGE UP (ref 0–0.2)
BASOPHILS NFR BLD AUTO: 0.2 % — SIGNIFICANT CHANGE UP (ref 0–2)
BILIRUB SERPL-MCNC: 0.8 MG/DL — SIGNIFICANT CHANGE UP (ref 0.2–1.2)
BILIRUB UR-MCNC: NEGATIVE — SIGNIFICANT CHANGE UP
BUN SERPL-MCNC: 28 MG/DL — HIGH (ref 7–23)
CALCIUM SERPL-MCNC: 9.2 MG/DL — SIGNIFICANT CHANGE UP (ref 8.5–10.1)
CHLORIDE SERPL-SCNC: 105 MMOL/L — SIGNIFICANT CHANGE UP (ref 96–108)
CO2 SERPL-SCNC: 25 MMOL/L — SIGNIFICANT CHANGE UP (ref 22–31)
COLOR SPEC: YELLOW — SIGNIFICANT CHANGE UP
CREAT SERPL-MCNC: 1.16 MG/DL — SIGNIFICANT CHANGE UP (ref 0.5–1.3)
DIFF PNL FLD: ABNORMAL
EGFR: 61 ML/MIN/1.73M2 — SIGNIFICANT CHANGE UP
EOSINOPHIL # BLD AUTO: 0.05 K/UL — SIGNIFICANT CHANGE UP (ref 0–0.5)
EOSINOPHIL NFR BLD AUTO: 0.8 % — SIGNIFICANT CHANGE UP (ref 0–6)
FLUAV AG NPH QL: SIGNIFICANT CHANGE UP
FLUBV AG NPH QL: SIGNIFICANT CHANGE UP
GLUCOSE SERPL-MCNC: 144 MG/DL — HIGH (ref 70–99)
GLUCOSE UR QL: NEGATIVE — SIGNIFICANT CHANGE UP
HCT VFR BLD CALC: 41.7 % — SIGNIFICANT CHANGE UP (ref 39–50)
HGB BLD-MCNC: 13.5 G/DL — SIGNIFICANT CHANGE UP (ref 13–17)
IMM GRANULOCYTES NFR BLD AUTO: 0.3 % — SIGNIFICANT CHANGE UP (ref 0–1.5)
INR BLD: 1.88 RATIO — HIGH (ref 0.88–1.16)
KETONES UR-MCNC: NEGATIVE — SIGNIFICANT CHANGE UP
LACTATE SERPL-SCNC: 1.2 MMOL/L — SIGNIFICANT CHANGE UP (ref 0.7–2)
LEUKOCYTE ESTERASE UR-ACNC: ABNORMAL
LYMPHOCYTES # BLD AUTO: 0.87 K/UL — LOW (ref 1–3.3)
LYMPHOCYTES # BLD AUTO: 14.6 % — SIGNIFICANT CHANGE UP (ref 13–44)
MCHC RBC-ENTMCNC: 32.1 PG — SIGNIFICANT CHANGE UP (ref 27–34)
MCHC RBC-ENTMCNC: 32.4 GM/DL — SIGNIFICANT CHANGE UP (ref 32–36)
MCV RBC AUTO: 99 FL — SIGNIFICANT CHANGE UP (ref 80–100)
MONOCYTES # BLD AUTO: 0.55 K/UL — SIGNIFICANT CHANGE UP (ref 0–0.9)
MONOCYTES NFR BLD AUTO: 9.2 % — SIGNIFICANT CHANGE UP (ref 2–14)
NEUTROPHILS # BLD AUTO: 4.45 K/UL — SIGNIFICANT CHANGE UP (ref 1.8–7.4)
NEUTROPHILS NFR BLD AUTO: 74.9 % — SIGNIFICANT CHANGE UP (ref 43–77)
NITRITE UR-MCNC: NEGATIVE — SIGNIFICANT CHANGE UP
PH UR: 5 — SIGNIFICANT CHANGE UP (ref 5–8)
PLATELET # BLD AUTO: 145 K/UL — LOW (ref 150–400)
POTASSIUM SERPL-MCNC: 4.6 MMOL/L — SIGNIFICANT CHANGE UP (ref 3.5–5.3)
POTASSIUM SERPL-SCNC: 4.6 MMOL/L — SIGNIFICANT CHANGE UP (ref 3.5–5.3)
PROT SERPL-MCNC: 7.9 GM/DL — SIGNIFICANT CHANGE UP (ref 6–8.3)
PROT UR-MCNC: 30 MG/DL
PROTHROM AB SERPL-ACNC: 22 SEC — HIGH (ref 10.5–13.4)
RBC # BLD: 4.21 M/UL — SIGNIFICANT CHANGE UP (ref 4.2–5.8)
RBC # FLD: 13.9 % — SIGNIFICANT CHANGE UP (ref 10.3–14.5)
RSV RNA NPH QL NAA+NON-PROBE: SIGNIFICANT CHANGE UP
SARS-COV-2 RNA SPEC QL NAA+PROBE: SIGNIFICANT CHANGE UP
SODIUM SERPL-SCNC: 135 MMOL/L — SIGNIFICANT CHANGE UP (ref 135–145)
SP GR SPEC: 1.02 — SIGNIFICANT CHANGE UP (ref 1.01–1.02)
UROBILINOGEN FLD QL: 1
WBC # BLD: 5.95 K/UL — SIGNIFICANT CHANGE UP (ref 3.8–10.5)
WBC # FLD AUTO: 5.95 K/UL — SIGNIFICANT CHANGE UP (ref 3.8–10.5)

## 2022-07-10 PROCEDURE — 71250 CT THORAX DX C-: CPT

## 2022-07-10 PROCEDURE — 93306 TTE W/DOPPLER COMPLETE: CPT

## 2022-07-10 PROCEDURE — 97163 PT EVAL HIGH COMPLEX 45 MIN: CPT | Mod: GP

## 2022-07-10 PROCEDURE — 85027 COMPLETE CBC AUTOMATED: CPT

## 2022-07-10 PROCEDURE — 97530 THERAPEUTIC ACTIVITIES: CPT | Mod: GP

## 2022-07-10 PROCEDURE — 70450 CT HEAD/BRAIN W/O DYE: CPT

## 2022-07-10 PROCEDURE — 97116 GAIT TRAINING THERAPY: CPT | Mod: GP

## 2022-07-10 PROCEDURE — 36415 COLL VENOUS BLD VENIPUNCTURE: CPT

## 2022-07-10 PROCEDURE — 94760 N-INVAS EAR/PLS OXIMETRY 1: CPT

## 2022-07-10 PROCEDURE — 99222 1ST HOSP IP/OBS MODERATE 55: CPT

## 2022-07-10 PROCEDURE — 85730 THROMBOPLASTIN TIME PARTIAL: CPT

## 2022-07-10 PROCEDURE — 94640 AIRWAY INHALATION TREATMENT: CPT

## 2022-07-10 PROCEDURE — 93010 ELECTROCARDIOGRAM REPORT: CPT

## 2022-07-10 PROCEDURE — 85025 COMPLETE CBC W/AUTO DIFF WBC: CPT

## 2022-07-10 PROCEDURE — 71045 X-RAY EXAM CHEST 1 VIEW: CPT | Mod: 26

## 2022-07-10 PROCEDURE — 99285 EMERGENCY DEPT VISIT HI MDM: CPT | Mod: CS

## 2022-07-10 PROCEDURE — 84443 ASSAY THYROID STIM HORMONE: CPT

## 2022-07-10 PROCEDURE — 80053 COMPREHEN METABOLIC PANEL: CPT

## 2022-07-10 PROCEDURE — 87070 CULTURE OTHR SPECIMN AEROBIC: CPT

## 2022-07-10 PROCEDURE — 70450 CT HEAD/BRAIN W/O DYE: CPT | Mod: 26

## 2022-07-10 PROCEDURE — 85610 PROTHROMBIN TIME: CPT

## 2022-07-10 PROCEDURE — 80048 BASIC METABOLIC PNL TOTAL CA: CPT

## 2022-07-10 RX ORDER — CARVEDILOL PHOSPHATE 80 MG/1
12.5 CAPSULE, EXTENDED RELEASE ORAL EVERY 12 HOURS
Refills: 0 | Status: DISCONTINUED | OUTPATIENT
Start: 2022-07-10 | End: 2022-07-14

## 2022-07-10 RX ORDER — ALBUTEROL 90 UG/1
2 AEROSOL, METERED ORAL EVERY 6 HOURS
Refills: 0 | Status: DISCONTINUED | OUTPATIENT
Start: 2022-07-10 | End: 2022-07-14

## 2022-07-10 RX ORDER — LACTOBACILLUS ACIDOPHILUS 100MM CELL
1 CAPSULE ORAL DAILY
Refills: 0 | Status: DISCONTINUED | OUTPATIENT
Start: 2022-07-10 | End: 2022-07-14

## 2022-07-10 RX ORDER — ACETAMINOPHEN 500 MG
650 TABLET ORAL EVERY 6 HOURS
Refills: 0 | Status: DISCONTINUED | OUTPATIENT
Start: 2022-07-10 | End: 2022-07-14

## 2022-07-10 RX ORDER — CEFTRIAXONE 500 MG/1
1000 INJECTION, POWDER, FOR SOLUTION INTRAMUSCULAR; INTRAVENOUS ONCE
Refills: 0 | Status: COMPLETED | OUTPATIENT
Start: 2022-07-10 | End: 2022-07-10

## 2022-07-10 RX ORDER — AZITHROMYCIN 500 MG/1
500 TABLET, FILM COATED ORAL ONCE
Refills: 0 | Status: COMPLETED | OUTPATIENT
Start: 2022-07-10 | End: 2022-07-10

## 2022-07-10 RX ORDER — ONDANSETRON 8 MG/1
4 TABLET, FILM COATED ORAL EVERY 8 HOURS
Refills: 0 | Status: DISCONTINUED | OUTPATIENT
Start: 2022-07-10 | End: 2022-07-14

## 2022-07-10 RX ORDER — LANOLIN ALCOHOL/MO/W.PET/CERES
3 CREAM (GRAM) TOPICAL AT BEDTIME
Refills: 0 | Status: DISCONTINUED | OUTPATIENT
Start: 2022-07-10 | End: 2022-07-14

## 2022-07-10 RX ORDER — ATORVASTATIN CALCIUM 80 MG/1
40 TABLET, FILM COATED ORAL AT BEDTIME
Refills: 0 | Status: DISCONTINUED | OUTPATIENT
Start: 2022-07-10 | End: 2022-07-14

## 2022-07-10 RX ORDER — SODIUM CHLORIDE 9 MG/ML
1000 INJECTION INTRAMUSCULAR; INTRAVENOUS; SUBCUTANEOUS
Refills: 0 | Status: DISCONTINUED | OUTPATIENT
Start: 2022-07-10 | End: 2022-07-10

## 2022-07-10 RX ORDER — WARFARIN SODIUM 2.5 MG/1
1 TABLET ORAL DAILY
Refills: 0 | Status: COMPLETED | OUTPATIENT
Start: 2022-07-10 | End: 2022-07-12

## 2022-07-10 RX ORDER — IPRATROPIUM/ALBUTEROL SULFATE 18-103MCG
3 AEROSOL WITH ADAPTER (GRAM) INHALATION EVERY 6 HOURS
Refills: 0 | Status: DISCONTINUED | OUTPATIENT
Start: 2022-07-10 | End: 2022-07-13

## 2022-07-10 RX ORDER — DIGOXIN 250 MCG
125 TABLET ORAL DAILY
Refills: 0 | Status: DISCONTINUED | OUTPATIENT
Start: 2022-07-10 | End: 2022-07-14

## 2022-07-10 RX ORDER — ASPIRIN/CALCIUM CARB/MAGNESIUM 324 MG
81 TABLET ORAL DAILY
Refills: 0 | Status: DISCONTINUED | OUTPATIENT
Start: 2022-07-10 | End: 2022-07-14

## 2022-07-10 RX ADMIN — WARFARIN SODIUM 1 MILLIGRAM(S): 2.5 TABLET ORAL at 23:24

## 2022-07-10 RX ADMIN — SODIUM CHLORIDE 125 MILLILITER(S): 9 INJECTION INTRAMUSCULAR; INTRAVENOUS; SUBCUTANEOUS at 15:39

## 2022-07-10 RX ADMIN — ATORVASTATIN CALCIUM 40 MILLIGRAM(S): 80 TABLET, FILM COATED ORAL at 23:25

## 2022-07-10 RX ADMIN — ALBUTEROL 2 PUFF(S): 90 AEROSOL, METERED ORAL at 23:57

## 2022-07-10 RX ADMIN — AZITHROMYCIN 255 MILLIGRAM(S): 500 TABLET, FILM COATED ORAL at 16:01

## 2022-07-10 RX ADMIN — CARVEDILOL PHOSPHATE 12.5 MILLIGRAM(S): 80 CAPSULE, EXTENDED RELEASE ORAL at 23:25

## 2022-07-10 RX ADMIN — CEFTRIAXONE 100 MILLIGRAM(S): 500 INJECTION, POWDER, FOR SOLUTION INTRAMUSCULAR; INTRAVENOUS at 15:39

## 2022-07-10 RX ADMIN — Medication 3 MILLIGRAM(S): at 23:25

## 2022-07-10 NOTE — H&P ADULT - ASSESSMENT
87 year old male patient with acute respiratory failure          - Admit to [facility]          # Acute Hypoxic Respiratory failure  -Doubt pneumonia  -patient with noticeable wheezing  -reactive airway disease likely a differential  -will discontinue antibiotics and start steroids  -get add on BNP  -give supplemental oxygen  -tessalon prn  -albuterol prn    # Hx of Afib  -on coumadin  -on coreg and digoxin    # HLD  -on statin    # Debility  -PT evaluation    # DVT ppx  -on coumadin

## 2022-07-10 NOTE — PROVIDER CONTACT NOTE (OTHER) - ACTION/TREATMENT ORDERED:
respi treatments to be ordered. no need for IVF- order can be discontinued.
CT head non contrast stat.

## 2022-07-10 NOTE — H&P ADULT - NSHPPHYSICALEXAM_GEN_ALL_CORE
Vital Signs Last 24 Hrs  T(C): 36.7 (10 Jul 2022 18:11), Max: 36.7 (10 Jul 2022 13:24)  T(F): 98.1 (10 Jul 2022 18:11), Max: 98.1 (10 Jul 2022 13:24)  HR: 82 (10 Jul 2022 18:11) (77 - 96)  BP: 114/68 (10 Jul 2022 18:11) (100/78 - 128/76)  BP(mean): 85 (10 Jul 2022 17:00) (73 - 88)  RR: 20 (10 Jul 2022 18:11) (18 - 27)  SpO2: 96% (10 Jul 2022 18:11) (91% - 96%)    Parameters below as of 10 Jul 2022 18:11  Patient On (Oxygen Delivery Method): nasal cannula  O2 Flow (L/min): 2

## 2022-07-10 NOTE — PATIENT PROFILE ADULT - FALL HARM RISK - HARM RISK INTERVENTIONS

## 2022-07-10 NOTE — PROVIDER CONTACT NOTE (OTHER) - ASSESSMENT
wheezing, dyspnea
forehead with laceration from Saturday as per patient. AOx3, forgetful of president. no pain reported at site. no hematoma/bruise/ bleeding noted. pupils equal and reactive.

## 2022-07-10 NOTE — H&P ADULT - HISTORY OF PRESENT ILLNESS
87 year old male patient with pertinent past medical history noted for Afib and CAD presented to the ED complaining of a 3-4 day history of a progressively worsening shortness of breath and exertional dyspnea. Of note, patient recently returned from [facility] where he had been staying for a month. He developed fatigue, weakness, had been eating less, had body aches and developed coughing and audible wheezing. Patient without known sick contacts, chest pain, palpitations, nausea or vomiting.      In the ED patient found to have a pulse of 91 percent on room air. He was given IV antibiotics and IVF hydration.

## 2022-07-10 NOTE — ED PROVIDER NOTE - PROGRESS NOTE DETAILS
Patient with hypoxia on RA (90-91) -> improves to 94-95 on 2L  CXR with increased markings right base - bibiana cover for CAP (last hospitalized in December). Reports PCN allergy from childhood = "swolllen glands".  Ceftriaxone ordered

## 2022-07-10 NOTE — ED ADULT TRIAGE NOTE - CHIEF COMPLAINT QUOTE
pt presents to ED due to SOB with cough and phylem x 3-4 days  as per wife pt O2 Sat 92% at home O2 in triage 91% minimal distressed noted

## 2022-07-10 NOTE — PROVIDER CONTACT NOTE (OTHER) - SITUATION
upon assessment, pt noted to have forehead laceration. as per pt, "slipped at home and must have hit my head on something". pt does not recall hitting head. on CD outpt.
pt with dysnpea with and without exerction and wheezing. on nasal cannula.

## 2022-07-10 NOTE — ED PROVIDER NOTE - CONSTITUTIONAL, MLM
normal... frail elderly male, appears comfortable, awake, alert, oriented to person, place, time/situation and in no apparent distress.

## 2022-07-10 NOTE — ED PROVIDER NOTE - OBJECTIVE STATEMENT
88 y/o male with a PMHx of DVT, afib presents to the ED c/o SOB. Pt c/o productive cough with phlegm for 3-4 days. Had hemiortho surgery in December. Wife reports pt had an adverse reaction to meds. Pt c/o diarrhea, wheezing. Yesterday morning, pt had weakness. Denies fever. Vaccinated for COVID.

## 2022-07-10 NOTE — ED ADULT NURSE NOTE - NSIMPLEMENTINTERV_GEN_ALL_ED
Implemented All Fall with Harm Risk Interventions:  [place] to call system. Call bell, personal items and telephone within reach. Instruct patient to call for assistance. Room bathroom lighting operational. Non-slip footwear when patient is off stretcher. Physically safe environment: no spills, clutter or unnecessary equipment. Stretcher in lowest position, wheels locked, appropriate side rails in place. Provide visual cue, wrist band, yellow gown, etc. Monitor gait and stability. Monitor for mental status changes and reorient to person, place, and time. Review medications for side effects contributing to fall risk. Reinforce activity limits and safety measures with patient and family. Provide visual clues: red socks.

## 2022-07-11 ENCOUNTER — RX RENEWAL (OUTPATIENT)
Age: 87
End: 2022-07-11

## 2022-07-11 DIAGNOSIS — R06.00 DYSPNEA, UNSPECIFIED: ICD-10-CM

## 2022-07-11 LAB
ALBUMIN SERPL ELPH-MCNC: 3.1 G/DL — LOW (ref 3.3–5)
ALP SERPL-CCNC: 59 U/L — SIGNIFICANT CHANGE UP (ref 40–120)
ALT FLD-CCNC: 35 U/L — SIGNIFICANT CHANGE UP (ref 12–78)
ANION GAP SERPL CALC-SCNC: 4 MMOL/L — LOW (ref 5–17)
AST SERPL-CCNC: 25 U/L — SIGNIFICANT CHANGE UP (ref 15–37)
BASOPHILS # BLD AUTO: 0.01 K/UL — SIGNIFICANT CHANGE UP (ref 0–0.2)
BASOPHILS NFR BLD AUTO: 0.2 % — SIGNIFICANT CHANGE UP (ref 0–2)
BILIRUB SERPL-MCNC: 0.6 MG/DL — SIGNIFICANT CHANGE UP (ref 0.2–1.2)
BUN SERPL-MCNC: 21 MG/DL — SIGNIFICANT CHANGE UP (ref 7–23)
CALCIUM SERPL-MCNC: 9.1 MG/DL — SIGNIFICANT CHANGE UP (ref 8.5–10.1)
CHLORIDE SERPL-SCNC: 106 MMOL/L — SIGNIFICANT CHANGE UP (ref 96–108)
CO2 SERPL-SCNC: 28 MMOL/L — SIGNIFICANT CHANGE UP (ref 22–31)
CREAT SERPL-MCNC: 1.09 MG/DL — SIGNIFICANT CHANGE UP (ref 0.5–1.3)
CULTURE RESULTS: SIGNIFICANT CHANGE UP
EGFR: 66 ML/MIN/1.73M2 — SIGNIFICANT CHANGE UP
EOSINOPHIL # BLD AUTO: 0.09 K/UL — SIGNIFICANT CHANGE UP (ref 0–0.5)
EOSINOPHIL NFR BLD AUTO: 1.9 % — SIGNIFICANT CHANGE UP (ref 0–6)
GLUCOSE SERPL-MCNC: 126 MG/DL — HIGH (ref 70–99)
HCT VFR BLD CALC: 41.2 % — SIGNIFICANT CHANGE UP (ref 39–50)
HGB BLD-MCNC: 13.3 G/DL — SIGNIFICANT CHANGE UP (ref 13–17)
IMM GRANULOCYTES NFR BLD AUTO: 0.2 % — SIGNIFICANT CHANGE UP (ref 0–1.5)
INR BLD: 2.13 RATIO — HIGH (ref 0.88–1.16)
LYMPHOCYTES # BLD AUTO: 0.93 K/UL — LOW (ref 1–3.3)
LYMPHOCYTES # BLD AUTO: 19.7 % — SIGNIFICANT CHANGE UP (ref 13–44)
MCHC RBC-ENTMCNC: 32 PG — SIGNIFICANT CHANGE UP (ref 27–34)
MCHC RBC-ENTMCNC: 32.3 GM/DL — SIGNIFICANT CHANGE UP (ref 32–36)
MCV RBC AUTO: 99 FL — SIGNIFICANT CHANGE UP (ref 80–100)
MONOCYTES # BLD AUTO: 0.46 K/UL — SIGNIFICANT CHANGE UP (ref 0–0.9)
MONOCYTES NFR BLD AUTO: 9.7 % — SIGNIFICANT CHANGE UP (ref 2–14)
NEUTROPHILS # BLD AUTO: 3.22 K/UL — SIGNIFICANT CHANGE UP (ref 1.8–7.4)
NEUTROPHILS NFR BLD AUTO: 68.3 % — SIGNIFICANT CHANGE UP (ref 43–77)
PLATELET # BLD AUTO: 132 K/UL — LOW (ref 150–400)
POTASSIUM SERPL-MCNC: 4.7 MMOL/L — SIGNIFICANT CHANGE UP (ref 3.5–5.3)
POTASSIUM SERPL-SCNC: 4.7 MMOL/L — SIGNIFICANT CHANGE UP (ref 3.5–5.3)
PROT SERPL-MCNC: 7.5 GM/DL — SIGNIFICANT CHANGE UP (ref 6–8.3)
PROTHROM AB SERPL-ACNC: 24.9 SEC — HIGH (ref 10.5–13.4)
RBC # BLD: 4.16 M/UL — LOW (ref 4.2–5.8)
RBC # FLD: 13.8 % — SIGNIFICANT CHANGE UP (ref 10.3–14.5)
SODIUM SERPL-SCNC: 138 MMOL/L — SIGNIFICANT CHANGE UP (ref 135–145)
SPECIMEN SOURCE: SIGNIFICANT CHANGE UP
WBC # BLD: 4.72 K/UL — SIGNIFICANT CHANGE UP (ref 3.8–10.5)
WBC # FLD AUTO: 4.72 K/UL — SIGNIFICANT CHANGE UP (ref 3.8–10.5)

## 2022-07-11 PROCEDURE — 99223 1ST HOSP IP/OBS HIGH 75: CPT

## 2022-07-11 PROCEDURE — 99233 SBSQ HOSP IP/OBS HIGH 50: CPT

## 2022-07-11 RX ADMIN — ALBUTEROL 2 PUFF(S): 90 AEROSOL, METERED ORAL at 08:19

## 2022-07-11 RX ADMIN — ATORVASTATIN CALCIUM 40 MILLIGRAM(S): 80 TABLET, FILM COATED ORAL at 21:19

## 2022-07-11 RX ADMIN — Medication 40 MILLIGRAM(S): at 10:38

## 2022-07-11 RX ADMIN — ALBUTEROL 2 PUFF(S): 90 AEROSOL, METERED ORAL at 14:09

## 2022-07-11 RX ADMIN — Medication 1 TABLET(S): at 10:38

## 2022-07-11 RX ADMIN — Medication 81 MILLIGRAM(S): at 10:38

## 2022-07-11 RX ADMIN — Medication 600 MILLIGRAM(S): at 12:31

## 2022-07-11 RX ADMIN — Medication 600 MILLIGRAM(S): at 21:19

## 2022-07-11 RX ADMIN — Medication 3 MILLIGRAM(S): at 21:19

## 2022-07-11 RX ADMIN — CARVEDILOL PHOSPHATE 12.5 MILLIGRAM(S): 80 CAPSULE, EXTENDED RELEASE ORAL at 10:38

## 2022-07-11 RX ADMIN — CARVEDILOL PHOSPHATE 12.5 MILLIGRAM(S): 80 CAPSULE, EXTENDED RELEASE ORAL at 21:19

## 2022-07-11 RX ADMIN — WARFARIN SODIUM 1 MILLIGRAM(S): 2.5 TABLET ORAL at 21:19

## 2022-07-11 RX ADMIN — Medication 3 MILLILITER(S): at 20:23

## 2022-07-11 RX ADMIN — Medication 125 MICROGRAM(S): at 10:38

## 2022-07-11 NOTE — PROGRESS NOTE ADULT - ASSESSMENT
87 year old male patient with pertinent past medical history noted for Afib and CAD admitted for:         1. SOB, Acute Hypoxic respiratory failure Likely related to reactive airway Dz  Pt denies h/o asthma or emphysema, but has remote h/o smoking in the past   CXR: no PNA, R lower lung atelectasis   Monitor Pulse ox, wean off O2 as tolerated   C/w IC Solumedrol  Inhalers: albuterol   Muconex  Will send Sputum Cx   Pulm eval  Also noted to have elevated BNP, no h/o CHF, check ECHO  [facility] eval, d/w cardiac NP       2.  Diarrhea, improved  Possibly viral syndrome?   COVID neg   Infl and RVP neg   F/u BCX x 2: NGTD  UCX: NG   Supportive care         3. Chronic AFIB  Monitor on tele  C/w Digoxin and carvedilol  C/w Coumadin, monitor INR daily, Tx today       4. CAD, HTN   Stable  C/w ASA, lipitor, Carvedilol         5. DVT PPX: Tx INR

## 2022-07-11 NOTE — PHYSICAL THERAPY INITIAL EVALUATION ADULT - MODALITIES TREATMENT COMMENTS
pt left seated in chair post Eval; chair alarm donned; spouse present; callbell in reach; pt instructed not to get up alone; call nursing for assist; yuliana well; denied pain

## 2022-07-11 NOTE — PROGRESS NOTE ADULT - SUBJECTIVE AND OBJECTIVE BOX
CC: Acute Hypoxic Respiratory failure (2022 18:23)    HPI: 87 year old male patient with pertinent past medical history noted for Afib and CAD presented to the ED complaining of a 3-4 day history of a progressively worsening shortness of breath and exertional dyspnea. Of note, patient recently returned from [facility] where he had been staying for a month. He developed fatigue, weakness, had been eating less, +  loose stools,  had body aches and developed coughing and audible wheezing. Patient without known sick contacts, chest pain, palpitations, nausea or vomiting.      In the ED patient found to have a pulse of 91 percent on room air. He was given IV antibiotics and IVF hydration.     INTERVAL HPI/ OVERNIGHT EVENTS: chart reviewed, Pt was seen and examined, confirmed history above, wife added had episode of near syncope at home while had diarrhea, Pt reports that  wheezing and SOB better, still with some cough with light colored Phlegm.  POC d/w Pt and pulm     Vital Signs Last 24 Hrs  T(C): 36.7 (2022 16:12), Max: 36.7 (2022 08:42)  T(F): 98.1 (2022 16:12), Max: 98.1 (2022 08:42)  HR: 68 (2022 16:12) (63 - 77)  BP: 106/68 (2022 16:12) (106/68 - 126/77)  BP(mean): 88 (2022 10:36) (88 - 101)  RR: 18 (2022 16:12) (18 - 18)  SpO2: 92% (2022 16:12) (92% - 98%)      REVIEW OF SYSTEMS:  All other review of systems is negative unless indicated above.      PHYSICAL EXAM:  General: Well developed; in no acute distress, on NC. [place]  Eyes: EOMI; conjunctiva and sclera clear  Head: Normocephalic; atraumatic  ENMT: No nasal discharge; airway clear  Neck: Supple; no JVD   Respiratory: Decreased BS at bases, with mild crackles on R, No wheezes  Cardiovascular: Irregular rate and rhythm. S1 and S2 Normal;   Gastrointestinal: Soft non-tender non-distended; Normal bowel sounds  Genitourinary: No  suprapubic  tenderness  Extremities: No  edema  Vascular: Peripheral pulses palpable 2+ bilaterally  Neurological: Alert and oriented x3, non focal   Skin: Warm and dry. No acute rash  Musculoskeletal: Normal muscle tone, without deformities  Psychiatric: Cooperative and appropriate      LABS:                         13.3   4.72  )-----------( 132      ( 2022 10:49 )             41.2     2022 10:49    138    |  106    |  21     ----------------------------<  126    4.7     |  28     |  1.09     Ca    9.1        2022 10:49    TPro  7.5    /  Alb  3.1    /  TBili  0.6    /  DBili  x      /  AST  25     /  ALT  35     /  AlkPhos  59     2022 10:49    PT/INR - ( 2022 10:49 )   PT: 24.9 sec;   INR: 2.13 ratio         PTT - ( 10 Jul 2022 13:57 )  PTT:34.9 sec  CAPILLARY BLOOD GLUCOSE        LIVER FUNCTIONS - ( 2022 10:49 )  Alb: 3.1 g/dL / Pro: 7.5 gm/dL / ALK PHOS: 59 U/L / ALT: 35 U/L / AST: 25 U/L / GGT: x           Urinalysis Basic - ( 10 Jul 2022 13:57 )    Color: Yellow / Appearance: Clear / S.025 / pH: x  Gluc: x / Ketone: Negative  / Bili: Negative / Urobili: 1   Blood: x / Protein: 30 mg/dL / Nitrite: Negative   Leuk Esterase: Moderate / RBC: 3-5 /HPF / WBC 6-10   Sq Epi: x / Non Sq Epi: Occasional / Bacteria: Few        MEDICATIONS  (STANDING):  albuterol/ipratropium for Nebulization 3 milliLiter(s) Nebulizer every 6 hours  aspirin enteric coated 81 milliGRAM(s) Oral daily  atorvastatin 40 milliGRAM(s) Oral at bedtime  carvedilol 12.5 milliGRAM(s) Oral every 12 hours  digoxin     Tablet 125 MICROGram(s) Oral daily  guaiFENesin  milliGRAM(s) Oral every 12 hours  lactobacillus acidophilus 1 Tablet(s) Oral daily  methylPREDNISolone sodium succinate Injectable 40 milliGRAM(s) IV Push daily  multivitamin 1 Tablet(s) Oral daily  multivitamin 1 Tablet(s) Oral daily  warfarin 1 milliGRAM(s) Oral daily    MEDICATIONS  (PRN):  acetaminophen     Tablet .. 650 milliGRAM(s) Oral every 6 hours PRN Mild Pain (1 - 3)  ALBUTerol    90 MICROgram(s) HFA Inhaler 2 Puff(s) Inhalation every 6 hours PRN Shortness of Breath and/or Wheezing  aluminum hydroxide/magnesium hydroxide/simethicone Suspension 30 milliLiter(s) Oral every 4 hours PRN Dyspepsia  melatonin 3 milliGRAM(s) Oral at bedtime PRN Insomnia  ondansetron Injectable 4 milliGRAM(s) IV Push every 8 hours PRN Nausea and/or Vomiting      RADIOLOGY & ADDITIONAL TESTS:  < from: CT Head No Cont (07.10.22 @ 22:19) >    ACC: [id number] EXAM:  CT BRAIN                          PROCEDURE DATE:  07/10/2022          INTERPRETATION:  CLINICAL INFORMATION: Head strike with laceration.    Patient is anticoagulated with blood thinners.    TECHNIQUE:  Axial CT images were acquired through the head.  Intravenous contrast: None  Two-dimensional reformats were generated.    COMPARISON STUDY: None    FINDINGS:  There is no CT evidence of acute intracranial hemorrhage, subdural   collection, mass effect or acute territorial infarct.    There is moderate generalized cerebral volume loss and mild   periventricular white matter hypoattenuation compatible with   microvascular ischemic disease.      There is minimal ethmoid air cell mucosal thickening..    The mastoids are clear bilaterally..    The calvarium, skull base, and orbits appear within normal limits.    .    IMPRESSION:  No CT evidence of acute intracranial pathology.  The patient's soft   tissue injury is not clearly visualized on the CT scan.        ACC: [id number] EXAM:  XR CHEST PORTABLE URGENT 1V                          PROCEDURE DATE:  07/10/2022          INTERPRETATION:  AP chest on July 10, 2022 at 1:31 PM. Patient has sepsis.    Shallow inspiration crowds the chest.    Heart magnified bytechnique.    Rather mild atelectasis at right base medially again noted and also a   slightly left base laterally.    Chest is similar to 2021.    IMPRESSION: Unchanged chest as above.

## 2022-07-11 NOTE — CONSULT NOTE ADULT - PROBLEM SELECTOR RECOMMENDATION 9
BNP =1500 but no signs of fluid overload.  Likely had diastolic dysfunction but currently w/o CHF.  Symptoms have improved w/ steroid treatment, likely had reactive airway disease. Shortness of breath

## 2022-07-12 LAB
ANION GAP SERPL CALC-SCNC: 5 MMOL/L — SIGNIFICANT CHANGE UP (ref 5–17)
APTT BLD: 34.8 SEC — SIGNIFICANT CHANGE UP (ref 27.5–35.5)
BUN SERPL-MCNC: 26 MG/DL — HIGH (ref 7–23)
CALCIUM SERPL-MCNC: 9.3 MG/DL — SIGNIFICANT CHANGE UP (ref 8.5–10.1)
CHLORIDE SERPL-SCNC: 107 MMOL/L — SIGNIFICANT CHANGE UP (ref 96–108)
CO2 SERPL-SCNC: 25 MMOL/L — SIGNIFICANT CHANGE UP (ref 22–31)
CREAT SERPL-MCNC: 1.1 MG/DL — SIGNIFICANT CHANGE UP (ref 0.5–1.3)
EGFR: 65 ML/MIN/1.73M2 — SIGNIFICANT CHANGE UP
GLUCOSE SERPL-MCNC: 132 MG/DL — HIGH (ref 70–99)
GRAM STN FLD: SIGNIFICANT CHANGE UP
HCT VFR BLD CALC: 39.8 % — SIGNIFICANT CHANGE UP (ref 39–50)
HGB BLD-MCNC: 13 G/DL — SIGNIFICANT CHANGE UP (ref 13–17)
INR BLD: 2.42 RATIO — HIGH (ref 0.88–1.16)
MCHC RBC-ENTMCNC: 32.5 PG — SIGNIFICANT CHANGE UP (ref 27–34)
MCHC RBC-ENTMCNC: 32.7 GM/DL — SIGNIFICANT CHANGE UP (ref 32–36)
MCV RBC AUTO: 99.5 FL — SIGNIFICANT CHANGE UP (ref 80–100)
PLATELET # BLD AUTO: 141 K/UL — LOW (ref 150–400)
POTASSIUM SERPL-MCNC: 4.6 MMOL/L — SIGNIFICANT CHANGE UP (ref 3.5–5.3)
POTASSIUM SERPL-SCNC: 4.6 MMOL/L — SIGNIFICANT CHANGE UP (ref 3.5–5.3)
PROTHROM AB SERPL-ACNC: 28.3 SEC — HIGH (ref 10.5–13.4)
RBC # BLD: 4 M/UL — LOW (ref 4.2–5.8)
RBC # FLD: 13.6 % — SIGNIFICANT CHANGE UP (ref 10.3–14.5)
SODIUM SERPL-SCNC: 137 MMOL/L — SIGNIFICANT CHANGE UP (ref 135–145)
SPECIMEN SOURCE: SIGNIFICANT CHANGE UP
WBC # BLD: 7.17 K/UL — SIGNIFICANT CHANGE UP (ref 3.8–10.5)
WBC # FLD AUTO: 7.17 K/UL — SIGNIFICANT CHANGE UP (ref 3.8–10.5)

## 2022-07-12 PROCEDURE — 71250 CT THORAX DX C-: CPT | Mod: 26

## 2022-07-12 PROCEDURE — 99233 SBSQ HOSP IP/OBS HIGH 50: CPT

## 2022-07-12 PROCEDURE — 93306 TTE W/DOPPLER COMPLETE: CPT | Mod: 26

## 2022-07-12 RX ORDER — CEFUROXIME AXETIL 250 MG
500 TABLET ORAL EVERY 12 HOURS
Refills: 0 | Status: DISCONTINUED | OUTPATIENT
Start: 2022-07-12 | End: 2022-07-14

## 2022-07-12 RX ADMIN — Medication 81 MILLIGRAM(S): at 10:58

## 2022-07-12 RX ADMIN — Medication 125 MICROGRAM(S): at 10:58

## 2022-07-12 RX ADMIN — WARFARIN SODIUM 1 MILLIGRAM(S): 2.5 TABLET ORAL at 21:01

## 2022-07-12 RX ADMIN — ATORVASTATIN CALCIUM 40 MILLIGRAM(S): 80 TABLET, FILM COATED ORAL at 21:01

## 2022-07-12 RX ADMIN — Medication 40 MILLIGRAM(S): at 10:59

## 2022-07-12 RX ADMIN — Medication 1 TABLET(S): at 10:58

## 2022-07-12 RX ADMIN — Medication 3 MILLILITER(S): at 02:13

## 2022-07-12 RX ADMIN — Medication 500 MILLIGRAM(S): at 13:40

## 2022-07-12 RX ADMIN — CARVEDILOL PHOSPHATE 12.5 MILLIGRAM(S): 80 CAPSULE, EXTENDED RELEASE ORAL at 21:01

## 2022-07-12 RX ADMIN — Medication 600 MILLIGRAM(S): at 21:01

## 2022-07-12 RX ADMIN — Medication 3 MILLIGRAM(S): at 21:01

## 2022-07-12 RX ADMIN — CARVEDILOL PHOSPHATE 12.5 MILLIGRAM(S): 80 CAPSULE, EXTENDED RELEASE ORAL at 10:58

## 2022-07-12 RX ADMIN — Medication 500 MILLIGRAM(S): at 21:01

## 2022-07-12 RX ADMIN — Medication 3 MILLILITER(S): at 20:29

## 2022-07-12 RX ADMIN — Medication 600 MILLIGRAM(S): at 10:58

## 2022-07-12 RX ADMIN — Medication 40 MILLIGRAM(S): at 21:02

## 2022-07-12 NOTE — PROGRESS NOTE ADULT - ASSESSMENT
87 year old male patient with pertinent past medical history noted for Afib and CAD admitted for:         1. SOB, Acute Hypoxic respiratory failure Likely related to reactive airway Dz  Pt denies h/o asthma or emphysema, but has remote h/o smoking in the past   CXR: no PNA, R lower lung atelectasis   Monitor Pulse ox, wean off O2 as tolerated   C/w IC Solumedrol  Inhalers: albuterol   Muconex  Will send Sputum Cx   Pulm eval  Also noted to have elevated BNP, no h/o CHF, check ECHO  [facility] eval, d/w cardiac NP       2.  Diarrhea, improved  Possibly viral syndrome?   COVID neg   Infl and RVP neg   F/u BCX x 2: NGTD  UCX: NG   Supportive care         3. Chronic AFIB  Monitor on tele  C/w Digoxin and carvedilol  C/w Coumadin, monitor INR daily, Tx today       4. CAD, HTN   Stable  C/w ASA, lipitor, Carvedilol         5. DVT PPX: Tx INR  87 year old male patient with pertinent past medical history noted for Afib and CAD admitted for:         1. SOB, Acute Hypoxic respiratory failure Likely related to reactive airway Dz. Likely bacterial acute bronchitis   Pt denies h/o asthma or emphysema, but has remote h/o smoking in the past   CXR: no PNA, R lower lung atelectasis   CT chest no PNA, + secretion in airways   Monitor Pulse ox, wean off O2 as tolerated, on RA at rest now  C/w IV Solumedrol, increase to 40mg BID  due to wheezing  Inhalers: albuterol   Mucinex  Sputum Cx; gew GNR  Start PO ceftin   ECHO: EF 50%   D/w DR Wilcox      2.  Diarrhea, resolved   Possibly viral syndrome?   COVID neg   Infl and RVP neg   F/u BCX x 2: NGTD  UCX: NG   Supportive care     3. Chronic AFIB  Monitor on tele  C/w Digoxin and carvedilol  C/w Coumadin, monitor INR daily, Tx today       4. CAD, HTN   Stable  C/w ASA, lipitor, Carvedilol         5. DVT PPX: Tx INR

## 2022-07-12 NOTE — PROGRESS NOTE ADULT - NS ATTEND AMEND GEN_ALL_CORE FT
Patient with hypertensive heart disease , atrial fibrillation   with shortness of breath likely due to exacerbation reactive airway disease continue  IV steroid bronchodilator treatment.   continue anticoagulation

## 2022-07-12 NOTE — PROGRESS NOTE ADULT - PROBLEM SELECTOR PLAN 1
Problem: Dyspnea.   -Possibly pulmonary, possible diastolic dysfunction.       BNP =1500 but no signs of fluid overload.  Symptoms have improved w/ steroid treatment, likely had reactive airway disease.  Echo done this morning, results pending

## 2022-07-12 NOTE — PROGRESS NOTE ADULT - SUBJECTIVE AND OBJECTIVE BOX
CC: Acute Hypoxic Respiratory failure (2022 18:23)    HPI: 87 year old male patient with pertinent past medical history noted for Afib and CAD presented to the ED complaining of a 3-4 day history of a progressively worsening shortness of breath and exertional dyspnea. Of note, patient recently returned from [facility] where he had been staying for a month. He developed fatigue, weakness, had been eating less, +  loose stools,  had body aches and developed coughing and audible wheezing. Patient without known sick contacts, chest pain, palpitations, nausea or vomiting.      In the ED patient found to have a pulse of 91 percent on room air. He was given IV antibiotics and IVF hydration.     INTERVAL HPI/ OVERNIGHT EVENTS: chart reviewed, Pt was seen and examined, confirmed history above, wife added had episode of near syncope at home while had diarrhea, Pt reports that  wheezing and SOB better, still with some cough with light colored Phlegm.  POC d/w Pt and pulm     Vital Signs Last 24 Hrs  T(C): 36.6 (2022 21:30), Max: 36.7 (2022 16:12)  T(F): 97.8 (2022 21:30), Max: 98.1 (2022 16:12)  HR: 78 (2022 02:21) (68 - 81)  BP: 135/69 (2022 21:30) (106/68 - 135/69)  RR: 18 (2022 21:30) (18 - 18)  SpO2: 97% (2022 02:21) (92% - 99%)    Parameters below as of 2022 02:21  Patient On (Oxygen Delivery Method): room air          REVIEW OF SYSTEMS:  All other review of systems is negative unless indicated above.      PHYSICAL EXAM:  General: Well developed; in no acute distress, on NC. [place]  Eyes: EOMI; conjunctiva and sclera clear  Head: Normocephalic; atraumatic  ENMT: No nasal discharge; airway clear  Neck: Supple; no JVD   Respiratory: Decreased BS at bases, with mild crackles on R, No wheezes  Cardiovascular: Irregular rate and rhythm. S1 and S2 Normal;   Gastrointestinal: Soft non-tender non-distended; Normal bowel sounds  Genitourinary: No  suprapubic  tenderness  Extremities: No  edema  Vascular: Peripheral pulses palpable 2+ bilaterally  Neurological: Alert and oriented x3, non focal   Skin: Warm and dry. No acute rash  Musculoskeletal: Normal muscle tone, without deformities  Psychiatric: Cooperative and appropriate      LABS:                         13.3   4.72  )-----------( 132      ( 2022 10:49 )             41.2     2022 10:49    138    |  106    |  21     ----------------------------<  126    4.7     |  28     |  1.09     Ca    9.1        2022 10:49    TPro  7.5    /  Alb  3.1    /  TBili  0.6    /  DBili  x      /  AST  25     /  ALT  35     /  AlkPhos  59     2022 10:49    PT/INR - ( 2022 10:49 )   PT: 24.9 sec;   INR: 2.13 ratio         PTT - ( 10 Jul 2022 13:57 )  PTT:34.9 sec  CAPILLARY BLOOD GLUCOSE        LIVER FUNCTIONS - ( 2022 10:49 )  Alb: 3.1 g/dL / Pro: 7.5 gm/dL / ALK PHOS: 59 U/L / ALT: 35 U/L / AST: 25 U/L / GGT: x           Urinalysis Basic - ( 10 Jul 2022 13:57 )    Color: Yellow / Appearance: Clear / S.025 / pH: x  Gluc: x / Ketone: Negative  / Bili: Negative / Urobili: 1   Blood: x / Protein: 30 mg/dL / Nitrite: Negative   Leuk Esterase: Moderate / RBC: 3-5 /HPF / WBC 6-10   Sq Epi: x / Non Sq Epi: Occasional / Bacteria: Few        MEDICATIONS  (STANDING):  albuterol/ipratropium for Nebulization 3 milliLiter(s) Nebulizer every 6 hours  aspirin enteric coated 81 milliGRAM(s) Oral daily  atorvastatin 40 milliGRAM(s) Oral at bedtime  carvedilol 12.5 milliGRAM(s) Oral every 12 hours  digoxin     Tablet 125 MICROGram(s) Oral daily  guaiFENesin  milliGRAM(s) Oral every 12 hours  lactobacillus acidophilus 1 Tablet(s) Oral daily  methylPREDNISolone sodium succinate Injectable 40 milliGRAM(s) IV Push daily  multivitamin 1 Tablet(s) Oral daily  multivitamin 1 Tablet(s) Oral daily  warfarin 1 milliGRAM(s) Oral daily    MEDICATIONS  (PRN):  acetaminophen     Tablet .. 650 milliGRAM(s) Oral every 6 hours PRN Mild Pain (1 - 3)  ALBUTerol    90 MICROgram(s) HFA Inhaler 2 Puff(s) Inhalation every 6 hours PRN Shortness of Breath and/or Wheezing  aluminum hydroxide/magnesium hydroxide/simethicone Suspension 30 milliLiter(s) Oral every 4 hours PRN Dyspepsia  melatonin 3 milliGRAM(s) Oral at bedtime PRN Insomnia  ondansetron Injectable 4 milliGRAM(s) IV Push every 8 hours PRN Nausea and/or Vomiting      RADIOLOGY & ADDITIONAL TESTS:  < from: CT Head No Cont (07.10.22 @ 22:19) >    ACC: [id number] EXAM:  CT BRAIN                          PROCEDURE DATE:  07/10/2022          INTERPRETATION:  CLINICAL INFORMATION: Head strike with laceration.    Patient is anticoagulated with blood thinners.    TECHNIQUE:  Axial CT images were acquired through the head.  Intravenous contrast: None  Two-dimensional reformats were generated.    COMPARISON STUDY: None    FINDINGS:  There is no CT evidence of acute intracranial hemorrhage, subdural   collection, mass effect or acute territorial infarct.    There is moderate generalized cerebral volume loss and mild   periventricular white matter hypoattenuation compatible with   microvascular ischemic disease.      There is minimal ethmoid air cell mucosal thickening..    The mastoids are clear bilaterally..    The calvarium, skull base, and orbits appear within normal limits.    .    IMPRESSION:  No CT evidence of acute intracranial pathology.  The patient's soft   tissue injury is not clearly visualized on the CT scan.        ACC: [id number] EXAM:  XR CHEST PORTABLE URGENT 1V                          PROCEDURE DATE:  07/10/2022          INTERPRETATION:  AP chest on July 10, 2022 at 1:31 PM. Patient has sepsis.    Shallow inspiration crowds the chest.    Heart magnified bytechnique.    Rather mild atelectasis at right base medially again noted and also a   slightly left base laterally.    Chest is similar to 2021.    IMPRESSION: Unchanged chest as above.       CC: Acute Hypoxic Respiratory failure (2022 18:23)    HPI: 87 year old male patient with pertinent past medical history noted for Afib and CAD presented to the ED complaining of a 3-4 day history of a progressively worsening shortness of breath and exertional dyspnea. Of note, patient recently returned from [facility] where he had been staying for a month. He developed fatigue, weakness, had been eating less, +  loose stools,  had body aches and developed coughing and audible wheezing. Patient without known sick contacts, chest pain, palpitations, nausea or vomiting.      In the ED patient found to have a pulse of 91 percent on room air. He was given IV antibiotics and IVF hydration.     INTERVAL HPI/ OVERNIGHT EVENTS:  Pt was seen and examined, reports feels better, denies SOB, cough and wheezing improved       Vital Signs Last 24 Hrs  T(C): 36.6 (2022 21:30), Max: 36.7 (2022 16:12)  T(F): 97.8 (2022 21:30), Max: 98.1 (2022 16:12)  HR: 78 (2022 02:21) (68 - 81)  BP: 135/69 (2022 21:30) (106/68 - 135/69)  RR: 18 (2022 21:30) (18 - 18)  SpO2: 97% (2022 02:21) (92% - 99%)    Parameters below as of 2022 02:21  Patient On (Oxygen Delivery Method): room air        REVIEW OF SYSTEMS:  All other review of systems is negative unless indicated above.      PHYSICAL EXAM:  General: Well developed; in no acute distress, on NC. [place]  Eyes: EOMI; conjunctiva and sclera clear  Head: Normocephalic; atraumatic  ENMT: No nasal discharge; airway clear  Neck: Supple; no JVD   Respiratory: b/l wheezes and coarse BS   Cardiovascular: Irregular rate and rhythm. S1 and S2 Normal;   Gastrointestinal: Soft non-tender non-distended; Normal bowel sounds  Genitourinary: No  suprapubic  tenderness  Extremities: No  edema  Vascular: Peripheral pulses palpable 2+ bilaterally  Neurological: Alert and oriented x3, non focal   Skin: Warm and dry. No acute rash  Musculoskeletal: Normal muscle tone, without deformities  Psychiatric: Cooperative and appropriate      LABS:                         13.0   7.17  )-----------( 141      ( 2022 08:29 )             39.8     07-12    137  |  107  |  26<H>  ----------------------------<  132<H>  4.6   |  25  |  1.10    Ca    9.3      2022 08:29    TPro  7.5  /  Alb  3.1<L>  /  TBili  0.6  /  DBili  x   /  AST  25  /  ALT  35  /  AlkPhos  59  07-11        LIVER FUNCTIONS - ( 2022 10:49 )  Alb: 3.1 g/dL / Pro: 7.5 gm/dL / ALK PHOS: 59 U/L / ALT: 35 U/L / AST: 25 U/L / GGT: x           PT/INR - ( 2022 08:29 )   PT: 28.3 sec;   INR: 2.42 ratio         PTT - ( 2022 08:29 )  PTT:34.8 sec                            13.3   4.72  )-----------( 132      ( 2022 10:49 )             41.2     2022 10:49    138    |  106    |  21     ----------------------------<  126    4.7     |  28     |  1.09     Ca    9.1        2022 10:49    TPro  7.5    /  Alb  3.1    /  TBili  0.6    /  DBili  x      /  AST  25     /  ALT  35     /  AlkPhos  59     2022 10:49    PT/INR - ( 2022 10:49 )   PT: 24.9 sec;   INR: 2.13 ratio         PTT - ( 10 Jul 2022 13:57 )  PTT:34.9 sec        LIVER FUNCTIONS - ( 2022 10:49 )  Alb: 3.1 g/dL / Pro: 7.5 gm/dL / ALK PHOS: 59 U/L / ALT: 35 U/L / AST: 25 U/L / GGT: x           Urinalysis Basic - ( 10 Jul 2022 13:57 )    Color: Yellow / Appearance: Clear / S.025 / pH: x  Gluc: x / Ketone: Negative  / Bili: Negative / Urobili: 1   Blood: x / Protein: 30 mg/dL / Nitrite: Negative   Leuk Esterase: Moderate / RBC: 3-5 /HPF / WBC 6-10   Sq Epi: x / Non Sq Epi: Occasional / Bacteria: Few    Culture - Sputum . (22 @ 21:10)   Gram Stain:   Few polymorphonuclear leukocytes per low power field   Few Squamous epithelial cells per low power field   Few Gram Negative Rods per oil power field   Specimen Source: .Sputum Sputum     MEDICATIONS  (STANDING):  albuterol/ipratropium for Nebulization 3 milliLiter(s) Nebulizer every 6 hours  aspirin enteric coated 81 milliGRAM(s) Oral daily  atorvastatin 40 milliGRAM(s) Oral at bedtime  carvedilol 12.5 milliGRAM(s) Oral every 12 hours  digoxin     Tablet 125 MICROGram(s) Oral daily  guaiFENesin  milliGRAM(s) Oral every 12 hours  lactobacillus acidophilus 1 Tablet(s) Oral daily  methylPREDNISolone sodium succinate Injectable 40 milliGRAM(s) IV Push daily  multivitamin 1 Tablet(s) Oral daily  multivitamin 1 Tablet(s) Oral daily  warfarin 1 milliGRAM(s) Oral daily    MEDICATIONS  (PRN):  acetaminophen     Tablet .. 650 milliGRAM(s) Oral every 6 hours PRN Mild Pain (1 - 3)  ALBUTerol    90 MICROgram(s) HFA Inhaler 2 Puff(s) Inhalation every 6 hours PRN Shortness of Breath and/or Wheezing  aluminum hydroxide/magnesium hydroxide/simethicone Suspension 30 milliLiter(s) Oral every 4 hours PRN Dyspepsia  melatonin 3 milliGRAM(s) Oral at bedtime PRN Insomnia  ondansetron Injectable 4 milliGRAM(s) IV Push every 8 hours PRN Nausea and/or Vomiting      RADIOLOGY & ADDITIONAL TESTS:  < from: CT Head No Cont (07.10.22 @ 22:19) >    ACC: [id number] EXAM:  CT BRAIN                          PROCEDURE DATE:  07/10/2022          INTERPRETATION:  CLINICAL INFORMATION: Head strike with laceration.    Patient is anticoagulated with blood thinners.    TECHNIQUE:  Axial CT images were acquired through the head.  Intravenous contrast: None  Two-dimensional reformats were generated.    COMPARISON STUDY: None    FINDINGS:  There is no CT evidence of acute intracranial hemorrhage, subdural   collection, mass effect or acute territorial infarct.    There is moderate generalized cerebral volume loss and mild   periventricular white matter hypoattenuation compatible with   microvascular ischemic disease.      There is minimal ethmoid air cell mucosal thickening..    The mastoids are clear bilaterally..    The calvarium, skull base, and orbits appear within normal limits.    .    IMPRESSION:  No CT evidence of acute intracranial pathology.  The patient's soft   tissue injury is not clearly visualized on the CT scan.        ACC: [id number] EXAM:  XR CHEST PORTABLE URGENT 1V                          PROCEDURE DATE:  07/10/2022          INTERPRETATION:  AP chest on July 10, 2022 at 1:31 PM. Patient has sepsis.    Shallow inspiration crowds the chest.    Heart magnified bytechnique.    Rather mild atelectasis at right base medially again noted and also a   slightly left base laterally.    Chest is similar to 2021.    IMPRESSION: Unchanged chest as above.

## 2022-07-12 NOTE — CONSULT NOTE ADULT - ASSESSMENT
87 year old male patient with pertinent past medical history noted for Afib and CAD presented to the ED complaining of a 3-4 day history of a progressively worsening shortness of breath and exertional dyspnea. Of note, patient recently returned from [facility] where he had been staying for a month. He developed fatigue, weakness, had been eating less, had body aches and developed coughing and audible wheezing. Patient without known sick contacts, chest pain, palpitations, nausea or vomiting.      In the ED patient found to have a pulse of 91 percent on room air. He was given IV antibiotics and IVF hydration. (10 Jul 2022 18:58)    pt is known to me from outpt eval  seen and examined data discussed  no cp  pos cough and congestion    Assessment / plan:  Acute bronchitis / bronchiolitis with bronchospasm  Mucus plugging as cont factor  diastolic CHF  basilar atelectasis due to above  adventitious sounds on exam bilateral lower lobes, r/o pneumonia  Hx DVT and Afib on AC / therapeutic INR    agree with steroids  bronchodilator rx  ct scan chest for eval  fu sputum cx / may consider adding antibiotics if persistent cough or pos cultures/ infiltrate on ct scan  walk test for eval hypoxemia on exertion

## 2022-07-12 NOTE — CONSULT NOTE ADULT - SUBJECTIVE AND OBJECTIVE BOX
Patient is a 87y old  Male who presents with a chief complaint of Acute Hypoxic Respiratory failure (2022 18:23)      HPI:  87 year old male patient with pertinent past medical history noted for Afib and CAD presented to the ED complaining of a 3-4 day history of a progressively worsening shortness of breath and exertional dyspnea. Of note, patient recently returned from [facility] where he had been staying for a month. He developed fatigue, weakness, had been eating less, had body aches and developed coughing and audible wheezing. Patient without known sick contacts, chest pain, palpitations, nausea or vomiting.      In the ED patient found to have a pulse of 91 percent on room air. He was given IV antibiotics and IVF hydration. (10 Jul 2022 18:58)    pt is known to me from outpt eval  seen and examined data discussed  no cp  pos cough and congestion  PAST MEDICAL & SURGICAL HISTORY:  Afib      DVT (deep venous thrombosis)      Hyperlipemia      H/O cardiomyopathy      Factor V Leiden      History of appendectomy          PREVIOUS DIAGNOSTIC TESTING:      MEDICATIONS  (STANDING):  albuterol/ipratropium for Nebulization 3 milliLiter(s) Nebulizer every 6 hours  aspirin enteric coated 81 milliGRAM(s) Oral daily  atorvastatin 40 milliGRAM(s) Oral at bedtime  carvedilol 12.5 milliGRAM(s) Oral every 12 hours  digoxin     Tablet 125 MICROGram(s) Oral daily  guaiFENesin  milliGRAM(s) Oral every 12 hours  lactobacillus acidophilus 1 Tablet(s) Oral daily  methylPREDNISolone sodium succinate Injectable 40 milliGRAM(s) IV Push daily  multivitamin 1 Tablet(s) Oral daily  multivitamin 1 Tablet(s) Oral daily  warfarin 1 milliGRAM(s) Oral daily    MEDICATIONS  (PRN):  acetaminophen     Tablet .. 650 milliGRAM(s) Oral every 6 hours PRN Mild Pain (1 - 3)  ALBUTerol    90 MICROgram(s) HFA Inhaler 2 Puff(s) Inhalation every 6 hours PRN Shortness of Breath and/or Wheezing  aluminum hydroxide/magnesium hydroxide/simethicone Suspension 30 milliLiter(s) Oral every 4 hours PRN Dyspepsia  melatonin 3 milliGRAM(s) Oral at bedtime PRN Insomnia  ondansetron Injectable 4 milliGRAM(s) IV Push every 8 hours PRN Nausea and/or Vomiting      FAMILY HISTORY:  FH: diabetes mellitus    FH: breast cancer        SOCIAL HISTORY:  ***    REVIEW OF SYSTEM:  Pertinent items are noted in HPI.      Vital Signs Last 24 Hrs  T(C): 36.6 (2022 21:30), Max: 36.7 (2022 08:42)  T(F): 97.8 (2022 21:30), Max: 98.1 (2022 08:42)  HR: 78 (2022 02:21) (63 - 81)  BP: 135/69 (2022 21:30) (106/68 - 135/69)  BP(mean): 88 (2022 10:36) (88 - 88)  RR: 18 (2022 21:30) (18 - 18)  SpO2: 97% (2022 02:21) (92% - 99%)    Parameters below as of 2022 02:21  Patient On (Oxygen Delivery Method): room air        I&O's Summary    2022 07:01  -  2022 07:00  --------------------------------------------------------  IN: 0 mL / OUT: 300 mL / NET: -300 mL      PHYSICAL EXAM  General Appearance: cooperative, no acute distress,   HEENT: PERRL, conjunctiva clear, EOM's intact, non injected pharynx, no exudate, TM   normal  Neck: Supple, , no adenopathy, thyroid: not enlarged, no carotid bruit or JVD  Back: Symmetric, no  tenderness,no soft tissue tenderness  Lungs: bilateral lower lobe rhonchi and exp wheeze  Heart: Regular rate and rhythm, S1, S2 normal, no murmur, rub or gallop  Abdomen: Soft, non-tender, bowel sounds active , no hepatosplenomegaly  Extremities: no cyanosis or edema, no joint swelling  Skin: Skin color, texture normal, no rashes   Neurologic: Alert and oriented X3 , cranial nerves intact, sensory and motor normal,    ECG:    LABS:                          13.3   4.72  )-----------( 132      ( 2022 10:49 )             41.2     07-11    138  |  106  |  21  ----------------------------<  126<H>  4.7   |  28  |  1.09    Ca    9.1      2022 10:49    TPro  7.5  /  Alb  3.1<L>  /  TBili  0.6  /  DBili  x   /  AST  25  /  ALT  35  /  AlkPhos  59  07-11          Pro BNP  1637 07-10 @ 13:57  D Dimer  -- 07-10 @ 13:57    PT/INR - ( 2022 10:49 )   PT: 24.9 sec;   INR: 2.13 ratio         PTT - ( 10 Jul 2022 13:57 )  PTT:34.9 sec  Urinalysis Basic - ( 10 Jul 2022 13:57 )    Color: Yellow / Appearance: Clear / S.025 / pH: x  Gluc: x / Ketone: Negative  / Bili: Negative / Urobili: 1   Blood: x / Protein: 30 mg/dL / Nitrite: Negative   Leuk Esterase: Moderate / RBC: 3-5 /HPF / WBC 6-10   Sq Epi: x / Non Sq Epi: Occasional / Bacteria: Few            RADIOLOGY & ADDITIONAL STUDIES:  < from: CT Head No Cont (07.10.22 @ 22:19) >  COMPARISON STUDY: None    FINDINGS:  There is no CT evidence of acute intracranial hemorrhage, subdural   collection, mass effect or acute territorial infarct.    There is moderate generalized cerebral volume loss and mild   periventricular white matter hypoattenuation compatible with   microvascular ischemic disease.      There is minimal ethmoid air cell mucosal thickening..    The mastoids are clear bilaterally..    The calvarium, skull base, and orbits appear within normal limits.    .    IMPRESSION:  No CT evidence of acute intracranial pathology.  The patient's soft   tissue injury is not clearly visualized on the CT scan.    < end of copied text >  < from: Xray Chest 1 View- PORTABLE-Urgent (07.10.22 @ 13:38) >  PROCEDURE DATE:  07/10/2022          INTERPRETATION:  AP chest on July 10, 2022 at 1:31 PM. Patient has sepsis.    Shallow inspiration crowds the chest.    Heart magnified bytechnique.    Rather mild atelectasis at right base medially again noted and also a   slightly left base laterally.    Chest is similar to 2021.    IMPRESSION: Unchanged chest as above.    < end of copied text >    
  87 year old male patient with pertinent past medical history noted for Afib and CAD   presented to the ED complaining of a 3-4 day history of a progressively worsening shortness of breath and exertional dyspnea. Of note, patient recently returned from [facility] where he had been staying for a month. He developed fatigue, weakness, had been eating less, had body aches and developed coughing and audible wheezing. Patient without known sick contacts, chest pain, palpitations, nausea or vomiting.      In the ED patient found to have a pulse of 91 percent on room air. He was given IV antibiotics and IVF hydration. (10 Jul 2022 18:58)    Admitted for acute hypoxic respiratory vailure  suspicious for reactive airway disease.  Started on steroids.    BNP checked 1,500.  pt has dyspnea but no symptoms specific for CHF:  no PND, orthopnea, LE edema.  no chest pain    Reviewed last note in clinic by Dr. Leigh  pt's cardiologist.  Last seen Feb '22.    ********Cardiac Cath 2/14/20 Single vessel CAD Moderate LAD disease   ********Echo 3/17/21 NL LV SYS FX EF 60% Moderate LVH, Mild MR moderate AS/Mild AI, mild Pulmonary HTN, AO 3.8 CM    PAST MEDICAL AND SURGICAL HISTORY:  PAST MEDICAL & SURGICAL HISTORY:  Afib      DVT (deep venous thrombosis)      Hyperlipemia      H/O cardiomyopathy      Factor V Leiden      History of appendectomy          ALLERGIES:  Allergies    pantoprazole (Unknown)  penicillin (Unknown)    Intolerances        SOCIAL HISTORY:  Social History:  . Denies smoking, alcohol or drug use (10 Jul 2022 18:58)      FAMILY  HISTORY:  FAMILY HISTORY:  FH: diabetes mellitus    FH: breast cancer        MEDICATIONS:  OUTPATIENT:  Home Medications:  Aspirin Enteric Coated 81 mg oral delayed release tablet: 1 tab(s) orally once a day (10 Jul 2022 15:27)  carvedilol 12.5 mg oral tablet: 1 tab(s) orally 2 times a day (10 Jul 2022 15:27)  digoxin 125 mcg (0.125 mg) oral tablet: 1 tab(s) orally once a day (10 Jul 2022 15:27)  Multiple Vitamins oral tablet: 1 tab(s) orally once a day (10 Jul 2022 15:27)  Probiotic Formula oral capsule: 1 cap(s) orally once a day (10 Jul 2022 15:27)  rosuvastatin 10 mg oral tablet: 1 tab(s) orally once a day (10 Jul 2022 15:27)  Super B Complex oral tablet: 1 tab(s) orally once a day (10 Jul 2022 15:27)  warfarin 1 mg oral tablet: 1 tab(s) orally once a day (at bedtime), monitor INR at rehab (10 Jul 2022 15:27)      INPATIENT:  MEDICATIONS  (STANDING):  albuterol/ipratropium for Nebulization 3 milliLiter(s) Nebulizer every 6 hours  aspirin enteric coated 81 milliGRAM(s) Oral daily  atorvastatin 40 milliGRAM(s) Oral at bedtime  carvedilol 12.5 milliGRAM(s) Oral every 12 hours  digoxin     Tablet 125 MICROGram(s) Oral daily  guaiFENesin  milliGRAM(s) Oral every 12 hours  lactobacillus acidophilus 1 Tablet(s) Oral daily  methylPREDNISolone sodium succinate Injectable 40 milliGRAM(s) IV Push daily  multivitamin 1 Tablet(s) Oral daily  multivitamin 1 Tablet(s) Oral daily  warfarin 1 milliGRAM(s) Oral daily    MEDICATIONS  (PRN):  acetaminophen     Tablet .. 650 milliGRAM(s) Oral every 6 hours PRN Mild Pain (1 - 3)  ALBUTerol    90 MICROgram(s) HFA Inhaler 2 Puff(s) Inhalation every 6 hours PRN Shortness of Breath and/or Wheezing  aluminum hydroxide/magnesium hydroxide/simethicone Suspension 30 milliLiter(s) Oral every 4 hours PRN Dyspepsia  melatonin 3 milliGRAM(s) Oral at bedtime PRN Insomnia  ondansetron Injectable 4 milliGRAM(s) IV Push every 8 hours PRN Nausea and/or Vomiting    MEDICATIONS  (PRN):  acetaminophen     Tablet .. 650 milliGRAM(s) Oral every 6 hours PRN Mild Pain (1 - 3)  ALBUTerol    90 MICROgram(s) HFA Inhaler 2 Puff(s) Inhalation every 6 hours PRN Shortness of Breath and/or Wheezing  aluminum hydroxide/magnesium hydroxide/simethicone Suspension 30 milliLiter(s) Oral every 4 hours PRN Dyspepsia  melatonin 3 milliGRAM(s) Oral at bedtime PRN Insomnia  ondansetron Injectable 4 milliGRAM(s) IV Push every 8 hours PRN Nausea and/or Vomiting      REVIEW OF SYSTEMS:  ===============================  ===============================  CONSTITUTIONAL: No weakness, fevers or chills  EYES/ENT: No visual changes;  No vertigo or throat pain   NECK: No pain or stiffness  RESPIRATORY: No cough, wheezing, hemoptysis; No shortness of breath  CARDIOVASCULAR: No chest pain or palpitations  GASTROINTESTINAL: No abdominal or epigastric pain. No nausea, vomiting, or hematemesis;   No diarrhea or constipation. No melena or hematochezia.  GENITOURINARY: No dysuria, frequency or hematuria  NEUROLOGICAL: No numbness or weakness  SKIN: No itching, burning, rashes, or lesions   All other review of systems is negative unless indicated above    Vital Signs Last 24 Hrs  T(C): 36.7 (11 Jul 2022 16:12), Max: 36.7 (11 Jul 2022 08:42)  T(F): 98.1 (11 Jul 2022 16:12), Max: 98.1 (11 Jul 2022 08:42)  HR: 68 (11 Jul 2022 16:12) (63 - 77)  BP: 106/68 (11 Jul 2022 16:12) (106/68 - 126/77)  BP(mean): 88 (11 Jul 2022 10:36) (88 - 101)  RR: 18 (11 Jul 2022 16:12) (18 - 18)  SpO2: 92% (11 Jul 2022 16:12) (92% - 98%)    Parameters below as of 11 Jul 2022 16:12  Patient On (Oxygen Delivery Method): room air        I&O's Summary    11 Jul 2022 07:01  -  11 Jul 2022 18:23  --------------------------------------------------------  IN: 0 mL / OUT: 300 mL / NET: -300 mL        I&O's Detail    11 Jul 2022 07:01  -  11 Jul 2022 18:23  --------------------------------------------------------  IN:  Total IN: 0 mL    OUT:    Voided (mL): 300 mL  Total OUT: 300 mL    Total NET: -300 mL          PHYSICAL EXAM:    Constitutional: NAD, awake and alert, well-developed  HEENT: PERR, EOMI,  No oral cyananosis.  Neck:  supple,  No JVD  Respiratory: Breath sounds are clear bilaterally, No wheezing, rales or rhonchi  Cardiovascular: S1 and S2, regular rate and rhythm, no Murmurs, gallops or rubs  Gastrointestinal: Bowel Sounds present, soft, nontender.   Extremities: No peripheral edema. No clubbing or cyanosis.  Vascular: 2+ peripheral pulses  Neurological: A/O x 3, no focal deficits  Musculoskeletal: no calf tenderness.  Skin: No rashes.    ===============================  ===============================  LABS:                         13.3   4.72  )-----------( 132      ( 11 Jul 2022 10:49 )             41.2                         13.5   5.95  )-----------( 145      ( 10 Jul 2022 13:57 )             41.7     11 Jul 2022 10:49    138    |  106    |  21     ----------------------------<  126    4.7     |  28     |  1.09   10 Jul 2022 13:57    135    |  105    |  28     ----------------------------<  144    4.6     |  25     |  1.16     Ca    9.1        11 Jul 2022 10:49  Ca    9.2        10 Jul 2022 13:57    TPro  7.5    /  Alb  3.1    /  TBili  0.6    /  DBili  x      /  AST  25     /  ALT  35     /  AlkPhos  59     11 Jul 2022 10:49  TPro  7.9    /  Alb  3.5    /  TBili  0.8    /  DBili  x      /  AST  31     /  ALT  36     /  AlkPhos  58     10 Jul 2022 13:57    PT/INR - ( 11 Jul 2022 10:49 )   PT: 24.9 sec;   INR: 2.13 ratio         PTT - ( 10 Jul 2022 13:57 )  PTT:34.9 sec    THYROID STUDIES:    ===============================  ===============================  CARDIAC BIOMARKERS:  -------  -BNP VALUES:  07-10 @ 13:57  Pro Bnp 1637    -------  -TROPONIN VALUES:       ===============================  ===============================  EKG: afib 90s no ischemia    TELE: afib, rate controlled.  ===============================  RADIOLOGY:    ACC: [id number] EXAM:  XR CHEST PORTABLE URGENT 1V                          PROCEDURE DATE:  07/10/2022          INTERPRETATION:  AP chest on July 10, 2022 at 1:31 PM. Patient has sepsis.    Shallow inspiration crowds the chest.    Heart magnified bytechnique.    Rather mild atelectasis at right base medially again noted and also a   slightly left base laterally.    Chest is similar to December 16, 2021.    IMPRESSION: Unchanged chest as above.    --- End of Report ---            BHAVNA ENGEL MD;Attending Radiologist  This document has been electronically signed. Jul 11 2022  2:25PM            ===============================    Brendon Barbosa M.D.  Cardiology, [facility]  Cell: [phone]  Office:   [phone] ([facility] Office)  [phone] ([facility] Office)    ===============================

## 2022-07-12 NOTE — PROGRESS NOTE ADULT - SUBJECTIVE AND OBJECTIVE BOX
87 year old male patient with pertinent past medical history noted for Afib and CAD   presented to the ED complaining of a 3-4 day history of a progressively worsening shortness of breath and exertional dyspnea. Of note, patient recently returned from [facility] where he had been staying for a month. He developed fatigue, weakness, had been eating less, had body aches and developed coughing and audible wheezing. Patient without known sick contacts, chest pain, palpitations, nausea or vomiting.      In the ED patient found to have a pulse of 91 percent on room air. He was given IV antibiotics and IVF hydration. (10 Jul 2022 18:58)    Admitted for acute hypoxic respiratory vailure  suspicious for reactive airway disease.  Started on steroids.    BNP checked 1,500.  pt has dyspnea but no symptoms specific for CHF:  no PND, orthopnea, LE edema.  no chest pain    Reviewed last note in clinic by Dr. Leigh  pt's cardiologist.  Last seen Feb '22.    ********Cardiac Cath 2/14/20 Single vessel CAD Moderate LAD disease   ********Echo 3/17/21 NL LV SYS FX EF 60% Moderate LVH, Mild MR moderate AS/Mild AI, mild Pulmonary HTN, AO 3.8 CM    7/12/22: Patient denies chest pain or shortness of breath. Just returned from echo.  Had coughing spell last night. Gave sputum specimen      ALLERGIES:  Allergies    pantoprazole (Unknown)  penicillin (Unknown)    Intolerances    MEDICATIONS:  OUTPATIENT:  Home Medications:  Aspirin Enteric Coated 81 mg oral delayed release tablet: 1 tab(s) orally once a day (10 Jul 2022 15:27)  carvedilol 12.5 mg oral tablet: 1 tab(s) orally 2 times a day (10 Jul 2022 15:27)  digoxin 125 mcg (0.125 mg) oral tablet: 1 tab(s) orally once a day (10 Jul 2022 15:27)  Multiple Vitamins oral tablet: 1 tab(s) orally once a day (10 Jul 2022 15:27)  Probiotic Formula oral capsule: 1 cap(s) orally once a day (10 Jul 2022 15:27)  rosuvastatin 10 mg oral tablet: 1 tab(s) orally once a day (10 Jul 2022 15:27)  Super B Complex oral tablet: 1 tab(s) orally once a day (10 Jul 2022 15:27)  warfarin 1 mg oral tablet: 1 tab(s) orally once a day (at bedtime), monitor INR at rehab (10 Jul 2022 15:27)      MEDICATIONS  (STANDING):  albuterol/ipratropium for Nebulization 3 milliLiter(s) Nebulizer every 6 hours  aspirin enteric coated 81 milliGRAM(s) Oral daily  atorvastatin 40 milliGRAM(s) Oral at bedtime  carvedilol 12.5 milliGRAM(s) Oral every 12 hours  digoxin     Tablet 125 MICROGram(s) Oral daily  guaiFENesin  milliGRAM(s) Oral every 12 hours  lactobacillus acidophilus 1 Tablet(s) Oral daily  methylPREDNISolone sodium succinate Injectable 40 milliGRAM(s) IV Push daily  multivitamin 1 Tablet(s) Oral daily  multivitamin 1 Tablet(s) Oral daily  warfarin 1 milliGRAM(s) Oral daily    MEDICATIONS  (PRN):  acetaminophen     Tablet .. 650 milliGRAM(s) Oral every 6 hours PRN Mild Pain (1 - 3)  ALBUTerol    90 MICROgram(s) HFA Inhaler 2 Puff(s) Inhalation every 6 hours PRN Shortness of Breath and/or Wheezing  aluminum hydroxide/magnesium hydroxide/simethicone Suspension 30 milliLiter(s) Oral every 4 hours PRN Dyspepsia  melatonin 3 milliGRAM(s) Oral at bedtime PRN Insomnia  ondansetron Injectable 4 milliGRAM(s) IV Push every 8 hours PRN Nausea and/or Vomiting      Vital Signs Last 24 Hrs  T(C): 36.6 (11 Jul 2022 21:30), Max: 36.7 (11 Jul 2022 16:12)  T(F): 97.8 (11 Jul 2022 21:30), Max: 98.1 (11 Jul 2022 16:12)  HR: 78 (12 Jul 2022 02:21) (68 - 81)  BP: 135/69 (11 Jul 2022 21:30) (106/68 - 135/69)  BP(mean): 88 (11 Jul 2022 10:36) (88 - 88)  RR: 18 (11 Jul 2022 21:30) (18 - 18)  SpO2: 97% (12 Jul 2022 02:21) (92% - 99%)    Parameters below as of 12 Jul 2022 02:21  Patient On (Oxygen Delivery Method): room air        Parameters below as of 11 Jul 2022 16:12  Patient On (Oxygen Delivery Method): room air        I&O's Summary    11 Jul 2022 07:01  -  11 Jul 2022 18:23  --------------------------------------------------------  IN: 0 mL / OUT: 300 mL / NET: -300 mL        I&O's Detail    11 Jul 2022 07:01  -  11 Jul 2022 18:23  --------------------------------------------------------  IN:  Total IN: 0 mL    OUT:    Voided (mL): 300 mL  Total OUT: 300 mL    Total NET: -300 mL    PHYSICAL EXAM:    Constitutional: NAD, awake and alert, well-developed  HEENT: PERR, EOMI,  No oral cyananosis.  Neck:  supple,  No JVD  Respiratory: Breath sounds are clear bilaterally, No wheezing, rales or rhonchi  Cardiovascular: S1 and S2, regular rate and rhythm, no Murmurs, gallops or rubs  Gastrointestinal: Bowel Sounds present, soft, nontender.   Extremities: No peripheral edema. No clubbing or cyanosis.  Vascular: 2+ peripheral pulses  Neurological: A/O x 3, no focal deficits  Musculoskeletal: no calf tenderness.  Skin: No rashes.    ===============================  ===============================  LABS:                           13.0   7.17  )-----------( 141      ( 12 Jul 2022 08:29 )             39.8                           13.3   4.72  )-----------( 132      ( 11 Jul 2022 10:49 )             41.2                         13.5   5.95  )-----------( 145      ( 10 Jul 2022 13:57 )             41.7     07-12    137  |  107  |  26<H>  ----------------------------<  132<H>  4.6   |  25  |  1.10    Ca    9.3      12 Jul 2022 08:29    TPro  7.5  /  Alb  3.1<L>  /  TBili  0.6  /  DBili  x   /  AST  25  /  ALT  35  /  AlkPhos  59  07-11      11 Jul 2022 10:49    138    |  106    |  21     ----------------------------<  126    4.7     |  28     |  1.09   10 Jul 2022 13:57    135    |  105    |  28     ----------------------------<  144    4.6     |  25     |  1.16     Ca    9.1        11 Jul 2022 10:49  Ca    9.2        10 Jul 2022 13:57    TPro  7.5    /  Alb  3.1    /  TBili  0.6    /  DBili  x      /  AST  25     /  ALT  35     /  AlkPhos  59     11 Jul 2022 10:49  TPro  7.9    /  Alb  3.5    /  TBili  0.8    /  DBili  x      /  AST  31     /  ALT  36     /  AlkPhos  58     10 Jul 2022 13:57    PT/INR - ( 11 Jul 2022 10:49 )   PT: 24.9 sec;   INR: 2.13 ratio         PTT - ( 10 Jul 2022 13:57 )  PTT:34.9 sec    THYROID STUDIES:    Thyroid Stimulating Hormone, Serum (07.11.22 @ 10:49)   Thyroid Stimulating Hormone, Serum: 2.04 uU/mL     ===============================  ===============================  CARDIAC BIOMARKERS:  -------  -BNP VALUES:  07-10 @ 13:57  Pro Bnp 1637    -------  -TROPONIN VALUES:       ===============================  ===============================  EKG: afib 90s no ischemia    TELE: afib, rate controlled.  ===============================  RADIOLOGY:    ACC: [id number] EXAM:  XR CHEST PORTABLE URGENT 1V                          PROCEDURE DATE:  07/10/2022          INTERPRETATION:  AP chest on July 10, 2022 at 1:31 PM. Patient has sepsis.    Shallow inspiration crowds the chest.    Heart magnified bytechnique.    Rather mild atelectasis at right base medially again noted and also a   slightly left base laterally.    Chest is similar to December 16, 2021.    IMPRESSION: Unchanged chest as above.      ===============================   87 year old male patient with pertinent past medical history noted for Afib and CAD   presented to the ED complaining of a 3-4 day history of a progressively worsening shortness of breath and exertional dyspnea. Of note, patient recently returned from [facility] where he had been staying for a month. He developed fatigue, weakness, had been eating less, had body aches and developed coughing and audible wheezing. Patient without known sick contacts, chest pain, palpitations, nausea or vomiting.      In the ED patient found to have a pulse of 91 percent on room air. He was given IV antibiotics and IVF hydration. (10 Jul 2022 18:58)    Admitted for acute hypoxic respiratory vailure  suspicious for reactive airway disease.  Started on steroids.    BNP checked 1,500.  pt has dyspnea but no symptoms specific for CHF:  no PND, orthopnea, LE edema.  no chest pain    Reviewed last note in clinic by Dr. Leigh  pt's cardiologist.  Last seen Feb '22.    ********Cardiac Cath 2/14/20 Single vessel CAD Moderate LAD disease   ********Echo 3/17/21 NL LV SYS FX EF 60% Moderate LVH, Mild MR moderate AS/Mild AI, mild Pulmonary HTN, AO 3.8 CM    7/12/22: Patient denies chest pain or shortness of breath. Just returned from echo.  Had coughing spell last night. Gave sputum specimen      ALLERGIES:  Allergies    pantoprazole (Unknown)  penicillin (Unknown)    Intolerances    MEDICATIONS:  OUTPATIENT:  Home Medications:  Aspirin Enteric Coated 81 mg oral delayed release tablet: 1 tab(s) orally once a day (10 Jul 2022 15:27)  carvedilol 12.5 mg oral tablet: 1 tab(s) orally 2 times a day (10 Jul 2022 15:27)  digoxin 125 mcg (0.125 mg) oral tablet: 1 tab(s) orally once a day (10 Jul 2022 15:27)  Multiple Vitamins oral tablet: 1 tab(s) orally once a day (10 Jul 2022 15:27)  Probiotic Formula oral capsule: 1 cap(s) orally once a day (10 Jul 2022 15:27)  rosuvastatin 10 mg oral tablet: 1 tab(s) orally once a day (10 Jul 2022 15:27)  Super B Complex oral tablet: 1 tab(s) orally once a day (10 Jul 2022 15:27)  warfarin 1 mg oral tablet: 1 tab(s) orally once a day (at bedtime), monitor INR at rehab (10 Jul 2022 15:27)      MEDICATIONS  (STANDING):  albuterol/ipratropium for Nebulization 3 milliLiter(s) Nebulizer every 6 hours  aspirin enteric coated 81 milliGRAM(s) Oral daily  atorvastatin 40 milliGRAM(s) Oral at bedtime  carvedilol 12.5 milliGRAM(s) Oral every 12 hours  digoxin     Tablet 125 MICROGram(s) Oral daily  guaiFENesin  milliGRAM(s) Oral every 12 hours  lactobacillus acidophilus 1 Tablet(s) Oral daily  methylPREDNISolone sodium succinate Injectable 40 milliGRAM(s) IV Push daily  multivitamin 1 Tablet(s) Oral daily  multivitamin 1 Tablet(s) Oral daily  warfarin 1 milliGRAM(s) Oral daily    MEDICATIONS  (PRN):  acetaminophen     Tablet .. 650 milliGRAM(s) Oral every 6 hours PRN Mild Pain (1 - 3)  ALBUTerol    90 MICROgram(s) HFA Inhaler 2 Puff(s) Inhalation every 6 hours PRN Shortness of Breath and/or Wheezing  aluminum hydroxide/magnesium hydroxide/simethicone Suspension 30 milliLiter(s) Oral every 4 hours PRN Dyspepsia  melatonin 3 milliGRAM(s) Oral at bedtime PRN Insomnia  ondansetron Injectable 4 milliGRAM(s) IV Push every 8 hours PRN Nausea and/or Vomiting      Vital Signs Last 24 Hrs  T(C): 36.6 (11 Jul 2022 21:30), Max: 36.7 (11 Jul 2022 16:12)  T(F): 97.8 (11 Jul 2022 21:30), Max: 98.1 (11 Jul 2022 16:12)  HR: 78 (12 Jul 2022 02:21) (68 - 81)  BP: 135/69 (11 Jul 2022 21:30) (106/68 - 135/69)  BP(mean): --  RR: 18 (11 Jul 2022 21:30) (18 - 18)  SpO2: 97% (12 Jul 2022 02:21) (92% - 99%)    Parameters below as of 12 Jul 2022 02:21  Patient On (Oxygen Delivery Method): room air        I&O's Summary    11 Jul 2022 07:01  -  12 Jul 2022 07:00  --------------------------------------------------------  IN: 0 mL / OUT: 300 mL / NET: -300 mL            PHYSICAL EXAM:    Constitutional: NAD, awake and alert, well-developed  HEENT: PERR, EOMI,  No oral cyananosis.  Neck:  supple,  No JVD  Respiratory: Breath sounds are clear bilaterally, No wheezing, rales or rhonchi  Cardiovascular: S1 and S2, regular rate and rhythm, no Murmurs, gallops or rubs  Gastrointestinal: Bowel Sounds present, soft, nontender.   Extremities: No peripheral edema. No clubbing or cyanosis.  Vascular: 2+ peripheral pulses  Neurological: A/O x 3, no focal deficits  Musculoskeletal: no calf tenderness.  Skin: No rashes.    ===============================  ===============================  LABS:                           13.0   7.17  )-----------( 141      ( 12 Jul 2022 08:29 )             39.8                           13.3   4.72  )-----------( 132      ( 11 Jul 2022 10:49 )             41.2                         13.5   5.95  )-----------( 145      ( 10 Jul 2022 13:57 )             41.7     07-12    137  |  107  |  26<H>  ----------------------------<  132<H>  4.6   |  25  |  1.10    Ca    9.3      12 Jul 2022 08:29    TPro  7.5  /  Alb  3.1<L>  /  TBili  0.6  /  DBili  x   /  AST  25  /  ALT  35  /  AlkPhos  59  07-11      11 Jul 2022 10:49    138    |  106    |  21     ----------------------------<  126    4.7     |  28     |  1.09   10 Jul 2022 13:57    135    |  105    |  28     ----------------------------<  144    4.6     |  25     |  1.16     Ca    9.1        11 Jul 2022 10:49  Ca    9.2        10 Jul 2022 13:57    TPro  7.5    /  Alb  3.1    /  TBili  0.6    /  DBili  x      /  AST  25     /  ALT  35     /  AlkPhos  59     11 Jul 2022 10:49  TPro  7.9    /  Alb  3.5    /  TBili  0.8    /  DBili  x      /  AST  31     /  ALT  36     /  AlkPhos  58     10 Jul 2022 13:57    PT/INR - ( 11 Jul 2022 10:49 )   PT: 24.9 sec;   INR: 2.13 ratio         PTT - ( 10 Jul 2022 13:57 )  PTT:34.9 sec    THYROID STUDIES:    Thyroid Stimulating Hormone, Serum (07.11.22 @ 10:49)   Thyroid Stimulating Hormone, Serum: 2.04 uU/mL     ===============================  ===============================  CARDIAC BIOMARKERS:  -------  -BNP VALUES:  07-10 @ 13:57  Pro Bnp 1637    -------  -TROPONIN VALUES:       ===============================  ===============================  EKG: afib 90s no ischemia    TELE: afib, rate controlled.  ===============================  RADIOLOGY:    ACC: [id number] EXAM:  XR CHEST PORTABLE URGENT 1V                          PROCEDURE DATE:  07/10/2022          INTERPRETATION:  AP chest on July 10, 2022 at 1:31 PM. Patient has sepsis.    Shallow inspiration crowds the chest.    Heart magnified bytechnique.    Rather mild atelectasis at right base medially again noted and also a   slightly left base laterally.    Chest is similar to December 16, 2021.    IMPRESSION: Unchanged chest as above.      ===============================

## 2022-07-13 ENCOUNTER — NON-APPOINTMENT (OUTPATIENT)
Age: 87
End: 2022-07-13

## 2022-07-13 LAB
ANION GAP SERPL CALC-SCNC: 6 MMOL/L — SIGNIFICANT CHANGE UP (ref 5–17)
APTT BLD: 34.3 SEC — SIGNIFICANT CHANGE UP (ref 27.5–35.5)
BUN SERPL-MCNC: 28 MG/DL — HIGH (ref 7–23)
CALCIUM SERPL-MCNC: 9.1 MG/DL — SIGNIFICANT CHANGE UP (ref 8.5–10.1)
CHLORIDE SERPL-SCNC: 107 MMOL/L — SIGNIFICANT CHANGE UP (ref 96–108)
CO2 SERPL-SCNC: 26 MMOL/L — SIGNIFICANT CHANGE UP (ref 22–31)
CREAT SERPL-MCNC: 1.15 MG/DL — SIGNIFICANT CHANGE UP (ref 0.5–1.3)
EGFR: 62 ML/MIN/1.73M2 — SIGNIFICANT CHANGE UP
GLUCOSE SERPL-MCNC: 186 MG/DL — HIGH (ref 70–99)
HCT VFR BLD CALC: 41 % — SIGNIFICANT CHANGE UP (ref 39–50)
HGB BLD-MCNC: 13.4 G/DL — SIGNIFICANT CHANGE UP (ref 13–17)
INR BLD: 2.85 RATIO — HIGH (ref 0.88–1.16)
MCHC RBC-ENTMCNC: 32.1 PG — SIGNIFICANT CHANGE UP (ref 27–34)
MCHC RBC-ENTMCNC: 32.7 GM/DL — SIGNIFICANT CHANGE UP (ref 32–36)
MCV RBC AUTO: 98.3 FL — SIGNIFICANT CHANGE UP (ref 80–100)
PLATELET # BLD AUTO: 142 K/UL — LOW (ref 150–400)
POTASSIUM SERPL-MCNC: 3.8 MMOL/L — SIGNIFICANT CHANGE UP (ref 3.5–5.3)
POTASSIUM SERPL-SCNC: 3.8 MMOL/L — SIGNIFICANT CHANGE UP (ref 3.5–5.3)
PROTHROM AB SERPL-ACNC: 33.4 SEC — HIGH (ref 10.5–13.4)
RBC # BLD: 4.17 M/UL — LOW (ref 4.2–5.8)
RBC # FLD: 13.4 % — SIGNIFICANT CHANGE UP (ref 10.3–14.5)
SODIUM SERPL-SCNC: 139 MMOL/L — SIGNIFICANT CHANGE UP (ref 135–145)
WBC # BLD: 9.43 K/UL — SIGNIFICANT CHANGE UP (ref 3.8–10.5)
WBC # FLD AUTO: 9.43 K/UL — SIGNIFICANT CHANGE UP (ref 3.8–10.5)

## 2022-07-13 PROCEDURE — 99233 SBSQ HOSP IP/OBS HIGH 50: CPT

## 2022-07-13 PROCEDURE — 99232 SBSQ HOSP IP/OBS MODERATE 35: CPT

## 2022-07-13 RX ORDER — WARFARIN SODIUM 2.5 MG/1
1 TABLET ORAL DAILY
Refills: 0 | Status: DISCONTINUED | OUTPATIENT
Start: 2022-07-13 | End: 2022-07-14

## 2022-07-13 RX ORDER — LIDOCAINE 4 G/100G
1 CREAM TOPICAL DAILY
Refills: 0 | Status: DISCONTINUED | OUTPATIENT
Start: 2022-07-13 | End: 2022-07-14

## 2022-07-13 RX ORDER — BUDESONIDE AND FORMOTEROL FUMARATE DIHYDRATE 160; 4.5 UG/1; UG/1
2 AEROSOL RESPIRATORY (INHALATION)
Refills: 0 | Status: DISCONTINUED | OUTPATIENT
Start: 2022-07-13 | End: 2022-07-14

## 2022-07-13 RX ORDER — SENNA PLUS 8.6 MG/1
2 TABLET ORAL AT BEDTIME
Refills: 0 | Status: DISCONTINUED | OUTPATIENT
Start: 2022-07-13 | End: 2022-07-14

## 2022-07-13 RX ORDER — POLYETHYLENE GLYCOL 3350 17 G/17G
17 POWDER, FOR SOLUTION ORAL DAILY
Refills: 0 | Status: DISCONTINUED | OUTPATIENT
Start: 2022-07-13 | End: 2022-07-14

## 2022-07-13 RX ADMIN — CARVEDILOL PHOSPHATE 12.5 MILLIGRAM(S): 80 CAPSULE, EXTENDED RELEASE ORAL at 09:19

## 2022-07-13 RX ADMIN — Medication 600 MILLIGRAM(S): at 21:24

## 2022-07-13 RX ADMIN — Medication 1 TABLET(S): at 09:19

## 2022-07-13 RX ADMIN — WARFARIN SODIUM 1 MILLIGRAM(S): 2.5 TABLET ORAL at 21:24

## 2022-07-13 RX ADMIN — Medication 81 MILLIGRAM(S): at 09:18

## 2022-07-13 RX ADMIN — Medication 40 MILLIGRAM(S): at 21:24

## 2022-07-13 RX ADMIN — Medication 1 TABLET(S): at 09:18

## 2022-07-13 RX ADMIN — ALBUTEROL 2 PUFF(S): 90 AEROSOL, METERED ORAL at 15:47

## 2022-07-13 RX ADMIN — Medication 600 MILLIGRAM(S): at 09:19

## 2022-07-13 RX ADMIN — BUDESONIDE AND FORMOTEROL FUMARATE DIHYDRATE 2 PUFF(S): 160; 4.5 AEROSOL RESPIRATORY (INHALATION) at 20:26

## 2022-07-13 RX ADMIN — ALBUTEROL 2 PUFF(S): 90 AEROSOL, METERED ORAL at 09:11

## 2022-07-13 RX ADMIN — Medication 500 MILLIGRAM(S): at 09:18

## 2022-07-13 RX ADMIN — POLYETHYLENE GLYCOL 3350 17 GRAM(S): 17 POWDER, FOR SOLUTION ORAL at 17:01

## 2022-07-13 RX ADMIN — Medication 500 MILLIGRAM(S): at 21:23

## 2022-07-13 RX ADMIN — Medication 3 MILLIGRAM(S): at 21:24

## 2022-07-13 RX ADMIN — CARVEDILOL PHOSPHATE 12.5 MILLIGRAM(S): 80 CAPSULE, EXTENDED RELEASE ORAL at 21:24

## 2022-07-13 RX ADMIN — LIDOCAINE 1 PATCH: 4 CREAM TOPICAL at 09:17

## 2022-07-13 RX ADMIN — ATORVASTATIN CALCIUM 40 MILLIGRAM(S): 80 TABLET, FILM COATED ORAL at 21:23

## 2022-07-13 RX ADMIN — Medication 40 MILLIGRAM(S): at 09:19

## 2022-07-13 RX ADMIN — Medication 125 MICROGRAM(S): at 09:18

## 2022-07-13 NOTE — PROGRESS NOTE ADULT - PROBLEM SELECTOR PLAN 1
-Possibly pulmonary, possible diastolic dysfunction.       BNP =1500 but no signs of fluid overload.  -Symptoms have improved w/ steroid treatment, likely had reactive airway disease.  -Reviewed echo EF reported as "lower limits of normal" but  by my assessment EF is normal.  No RWMA. No significant changes c/w prior OP echo.    OK to d/c from cardiac standpoint.  Will sign off please call if questions.

## 2022-07-13 NOTE — PROGRESS NOTE ADULT - SUBJECTIVE AND OBJECTIVE BOX
Patient is a 87y old  Male who presents with a chief complaint of Acute Hypoxic Respiratory failure (2022 18:23)      HPI:  87 year old male patient with pertinent past medical history noted for Afib and CAD presented to the ED complaining of a 3-4 day history of a progressively worsening shortness of breath and exertional dyspnea. Of note, patient recently returned from [facility] where he had been staying for a month. He developed fatigue, weakness, had been eating less, had body aches and developed coughing and audible wheezing. Patient without known sick contacts, chest pain, palpitations, nausea or vomiting.      In the ED patient found to have a pulse of 91 percent on room air. He was given IV antibiotics and IVF hydration. (10 Jul 2022 18:58)    pt is known to me from outpt eval  seen and examined data discussed  no cp  pos cough and congestion      pos residual cough and congestion  no distress  data discussed with hospitalist  overall feels better    PAST MEDICAL & SURGICAL HISTORY:  Afib      DVT (deep venous thrombosis)      Hyperlipemia      H/O cardiomyopathy      Factor V Leiden      History of appendectomy          MEDICATIONS  (STANDING):  albuterol/ipratropium for Nebulization 3 milliLiter(s) Nebulizer every 6 hours  aspirin enteric coated 81 milliGRAM(s) Oral daily  atorvastatin 40 milliGRAM(s) Oral at bedtime  carvedilol 12.5 milliGRAM(s) Oral every 12 hours  cefuroxime   Tablet 500 milliGRAM(s) Oral every 12 hours  digoxin     Tablet 125 MICROGram(s) Oral daily  guaiFENesin  milliGRAM(s) Oral every 12 hours  lactobacillus acidophilus 1 Tablet(s) Oral daily  methylPREDNISolone sodium succinate Injectable 40 milliGRAM(s) IV Push two times a day  multivitamin 1 Tablet(s) Oral daily  multivitamin 1 Tablet(s) Oral daily    MEDICATIONS  (PRN):  acetaminophen     Tablet .. 650 milliGRAM(s) Oral every 6 hours PRN Mild Pain (1 - 3)  ALBUTerol    90 MICROgram(s) HFA Inhaler 2 Puff(s) Inhalation every 6 hours PRN Shortness of Breath and/or Wheezing  aluminum hydroxide/magnesium hydroxide/simethicone Suspension 30 milliLiter(s) Oral every 4 hours PRN Dyspepsia  melatonin 3 milliGRAM(s) Oral at bedtime PRN Insomnia  ondansetron Injectable 4 milliGRAM(s) IV Push every 8 hours PRN Nausea and/or Vomiting      FAMILY HISTORY:  FH: diabetes mellitus    FH: breast cancer        SOCIAL HISTORY:  ***    REVIEW OF SYSTEM:  Pertinent items are noted in HPI.      Vital Signs Last 24 Hrs  T(C): 36.4 (2022 20:16), Max: 36.4 (2022 15:27)  T(F): 97.5 (2022 20:16), Max: 97.5 (2022 15:27)  HR: 93 (2022 20:29) (62 - 93)  BP: 150/82 (2022 20:16) (114/84 - 150/82)  BP(mean): --  RR: 18 (2022 20:16) (18 - 18)  SpO2: 92% (2022 20:29) (92% - 96%)    Parameters below as of 2022 21:01  Patient On (Oxygen Delivery Method): room air        I&O's Detail    PHYSICAL EXAM  General Appearance: cooperative, no acute distress,   HEENT: PERRL, conjunctiva clear, EOM's intact, non injected pharynx, no exudate, TM   normal  Neck: Supple, , no adenopathy, thyroid: not enlarged, no carotid bruit or JVD  Back: Symmetric, no  tenderness,no soft tissue tenderness  Lungs: bilateral lower lobe rhonchi and exp wheeze  Heart: Regular rate and rhythm, S1, S2 normal, no murmur, rub or gallop  Abdomen: Soft, non-tender, bowel sounds active , no hepatosplenomegaly  Extremities: no cyanosis or edema, no joint swelling  Skin: Skin color, texture normal, no rashes   Neurologic: Alert and oriented X3 , cranial nerves intact, sensory and motor normal,    ECG:    LABS:                          13.3   4.72  )-----------( 132      ( 2022 10:49 )             41.2     07-11    138  |  106  |  21  ----------------------------<  126<H>  4.7   |  28  |  1.09    Ca    9.1      2022 10:49    TPro  7.5  /  Alb  3.1<L>  /  TBili  0.6  /  DBili  x   /  AST  25  /  ALT  35  /  AlkPhos  59  07-11          Pro BNP  1637 07-10 @ 13:57  D Dimer  -- 07-10 @ 13:57    PT/INR - ( 2022 10:49 )   PT: 24.9 sec;   INR: 2.13 ratio         PTT - ( 10 Jul 2022 13:57 )  PTT:34.9 sec  Urinalysis Basic - ( 10 Jul 2022 13:57 )    Color: Yellow / Appearance: Clear / S.025 / pH: x  Gluc: x / Ketone: Negative  / Bili: Negative / Urobili: 1   Blood: x / Protein: 30 mg/dL / Nitrite: Negative   Leuk Esterase: Moderate / RBC: 3-5 /HPF / WBC 6-10   Sq Epi: x / Non Sq Epi: Occasional / Bacteria: Few            RADIOLOGY & ADDITIONAL STUDIES:  < from: CT Head No Cont (07.10.22 @ 22:19) >  COMPARISON STUDY: None    FINDINGS:  There is no CT evidence of acute intracranial hemorrhage, subdural   collection, mass effect or acute territorial infarct.    There is moderate generalized cerebral volume loss and mild   periventricular white matter hypoattenuation compatible with   microvascular ischemic disease.      There is minimal ethmoid air cell mucosal thickening..    The mastoids are clear bilaterally..    The calvarium, skull base, and orbits appear within normal limits.    .    IMPRESSION:  No CT evidence of acute intracranial pathology.  The patient's soft   tissue injury is not clearly visualized on the CT scan.    < end of copied text >  < from: Xray Chest 1 View- PORTABLE-Urgent (07.10.22 @ 13:38) >  PROCEDURE DATE:  07/10/2022          INTERPRETATION:  AP chest on July 10, 2022 at 1:31 PM. Patient has sepsis.    Shallow inspiration crowds the chest.    Heart magnified bytechnique.    Rather mild atelectasis at right base medially again noted and also a   slightly left base laterally.    Chest is similar to 2021.    IMPRESSION: Unchanged chest as above.    < end of copied text >

## 2022-07-13 NOTE — PROGRESS NOTE ADULT - ASSESSMENT
87 year old male patient with pertinent past medical history noted for Afib and CAD admitted for:         1. SOB, Acute Hypoxic respiratory failure Likely related to reactive airway Dz. Likely bacterial acute bronchitis   Pt denies h/o asthma or emphysema, but has remote h/o smoking in the past   CXR: no PNA, R lower lung atelectasis   CT chest no PNA, + secretion in airways   Monitor Pulse ox, wean off O2 as tolerated, on RA at rest now  C/w IV Solumedrol, on 40mg BID  due to wheezing  Inhalers: albuterol add synbicort  Mucinex  Sputum Cx; gew GNR  Start PO ceftin   ECHO: EF 50%, mild AS/AR/TR, mild Pulm HTN   D/w DR Barbosa, will review ECHO      2.  Diarrhea, resolved   Possibly viral syndrome?   COVID neg   Infl and RVP neg   F/u BCX x 2: NGTD  UCX: NG   Supportive care     3. Chronic AFIB   C/w Digoxin and carvedilol  C/w Coumadin, monitor INR daily, Tx today       4. CAD, HTN   Stable  C/w ASA, lipitor, Carvedilol         5. DVT PPX: Tx INR     Dispo; check O2 on ambulation, d/c planning id continues to improve

## 2022-07-13 NOTE — PROGRESS NOTE ADULT - SUBJECTIVE AND OBJECTIVE BOX
CC: Acute Hypoxic Respiratory failure (2022 18:23)    HPI: 87 year old male patient with pertinent past medical history noted for Afib and CAD presented to the ED complaining of a 3-4 day history of a progressively worsening shortness of breath and exertional dyspnea. Of note, patient recently returned from [facility] where he had been staying for a month. He developed fatigue, weakness, had been eating less, +  loose stools,  had body aches and developed coughing and audible wheezing. Patient without known sick contacts, chest pain, palpitations, nausea or vomiting.      In the ED patient found to have a pulse of 91 percent on room air. He was given IV antibiotics and IVF hydration.     INTERVAL HPI/ OVERNIGHT EVENTS:  Pt was seen and examined,  feels better still with some wheezing. C/o constipation       Vital Signs Last 24 Hrs  T(C): 36.3 (2022 15:37), Max: 36.4 (2022 20:16)  T(F): 97.3 (2022 15:37), Max: 97.5 (2022 20:16)  HR: 62 (2022 15:47) (62 - 93)  BP: 135/76 (2022 15:37) (135/76 - 150/82)  RR: 18 (2022 15:37) (18 - 18)  SpO2: 96% (2022 15:47) (92% - 96%)    Parameters below as of 2022 15:47  Patient On (Oxygen Delivery Method): room air      REVIEW OF SYSTEMS:  All other review of systems is negative unless indicated above.      PHYSICAL EXAM:  General: Well developed; in no acute distress, on NC. [place]  Eyes: EOMI; conjunctiva and sclera clear  Head: Normocephalic; atraumatic  ENMT: No nasal discharge; airway clear  Neck: Supple; no JVD   Respiratory: b/l wheezes and coarse BS at bases, wheezing resolves with deep breathing and cough   Cardiovascular: Irregular rate and rhythm. S1 and S2 Normal;   Gastrointestinal: Soft non-tender non-distended; Normal bowel sounds  Genitourinary: No  suprapubic  tenderness  Extremities: No  edema  Vascular: Peripheral pulses palpable 2+ bilaterally  Neurological: Alert and oriented x3, non focal   Skin: Warm and dry. No acute rash  Musculoskeletal: Normal muscle tone, without deformities  Psychiatric: Cooperative and appropriate      LABS:                         13.4   9.43  )-----------( 142      ( 2022 07:11 )             41.0     07-13    139  |  107  |  28<H>  ----------------------------<  186<H>  3.8   |  26  |  1.15    Ca    9.1      2022 07:11        PT/INR - ( 2022 07:11 )   PT: 33.4 sec;   INR: 2.85 ratio    PTT - ( 2022 07:11 )  PTT:34.3 sec                          13.0   7.17  )-----------( 141      ( 2022 08:29 )             39.8     07-12    137  |  107  |  26<H>  ----------------------------<  132<H>  4.6   |  25  |  1.10    Ca    9.3      2022 08:29    TPro  7.5  /  Alb  3.1<L>  /  TBili  0.6  /  DBili  x   /  AST  25  /  ALT  35  /  AlkPhos  59  07-11    LIVER FUNCTIONS - ( 2022 10:49 )  Alb: 3.1 g/dL / Pro: 7.5 gm/dL / ALK PHOS: 59 U/L / ALT: 35 U/L / AST: 25 U/L / GGT: x           PT/INR - ( 2022 08:29 )   PT: 28.3 sec;   INR: 2.42 ratio    PTT - ( 2022 08:29 )  PTT:34.8 sec                            13.3   4.72  )-----------( 132      ( 2022 10:49 )             41.2     2022 10:49    138    |  106    |  21     ----------------------------<  126    4.7     |  28     |  1.09     Ca    9.1        2022 10:49    TPro  7.5    /  Alb  3.1    /  TBili  0.6    /  DBili  x      /  AST  25     /  ALT  35     /  AlkPhos  59     2022 10:49  PT/INR - ( 2022 10:49 )   PT: 24.9 sec;   INR: 2.13 ratio    PTT - ( 10 Jul 2022 13:57 )  PTT:34.9 sec        LIVER FUNCTIONS - ( 2022 10:49 )  Alb: 3.1 g/dL / Pro: 7.5 gm/dL / ALK PHOS: 59 U/L / ALT: 35 U/L / AST: 25 U/L / GGT: x           Urinalysis Basic - ( 10 Jul 2022 13:57 )    Color: Yellow / Appearance: Clear / S.025 / pH: x  Gluc: x / Ketone: Negative  / Bili: Negative / Urobili: 1   Blood: x / Protein: 30 mg/dL / Nitrite: Negative   Leuk Esterase: Moderate / RBC: 3-5 /HPF / WBC 6-10   Sq Epi: x / Non Sq Epi: Occasional / Bacteria: Few    Culture - Sputum . (22 @ 21:10)   Gram Stain:   Few polymorphonuclear leukocytes per low power field   Few Squamous epithelial cells per low power field   Few Gram Negative Rods per oil power field   Specimen Source: .Sputum Sputum     MEDICATIONS  (STANDING):  aspirin enteric coated 81 milliGRAM(s) Oral daily  atorvastatin 40 milliGRAM(s) Oral at bedtime  budesonide  80 MICROgram(s)/formoterol 4.5 MICROgram(s) Inhaler 2 Puff(s) Inhalation two times a day  carvedilol 12.5 milliGRAM(s) Oral every 12 hours  cefuroxime   Tablet 500 milliGRAM(s) Oral every 12 hours  digoxin     Tablet 125 MICROGram(s) Oral daily  guaiFENesin  milliGRAM(s) Oral every 12 hours  lactobacillus acidophilus 1 Tablet(s) Oral daily  lidocaine   4% Patch 1 Patch Transdermal daily  methylPREDNISolone sodium succinate Injectable 40 milliGRAM(s) IV Push two times a day  multivitamin 1 Tablet(s) Oral daily  multivitamin 1 Tablet(s) Oral daily  polyethylene glycol 3350 17 Gram(s) Oral daily  warfarin 1 milliGRAM(s) Oral daily    MEDICATIONS  (PRN):  acetaminophen     Tablet .. 650 milliGRAM(s) Oral every 6 hours PRN Mild Pain (1 - 3)  ALBUTerol    90 MICROgram(s) HFA Inhaler 2 Puff(s) Inhalation every 6 hours PRN Shortness of Breath and/or Wheezing  aluminum hydroxide/magnesium hydroxide/simethicone Suspension 30 milliLiter(s) Oral every 4 hours PRN Dyspepsia  melatonin 3 milliGRAM(s) Oral at bedtime PRN Insomnia  ondansetron Injectable 4 milliGRAM(s) IV Push every 8 hours PRN Nausea and/or Vomiting  senna 2 Tablet(s) Oral at bedtime PRN Constipation        RADIOLOGY & ADDITIONAL TESTS:  < from: CT Head No Cont (07.10.22 @ 22:19) >    ACC: [id number] EXAM:  CT BRAIN                          PROCEDURE DATE:  07/10/2022          INTERPRETATION:  CLINICAL INFORMATION: Head strike with laceration.    Patient is anticoagulated with blood thinners.    TECHNIQUE:  Axial CT images were acquired through the head.  Intravenous contrast: None  Two-dimensional reformats were generated.    COMPARISON STUDY: None    FINDINGS:  There is no CT evidence of acute intracranial hemorrhage, subdural   collection, mass effect or acute territorial infarct.    There is moderate generalized cerebral volume loss and mild   periventricular white matter hypoattenuation compatible with   microvascular ischemic disease.      There is minimal ethmoid air cell mucosal thickening..    The mastoids are clear bilaterally..    The calvarium, skull base, and orbits appear within normal limits.    .    IMPRESSION:  No CT evidence of acute intracranial pathology.  The patient's soft   tissue injury is not clearly visualized on the CT scan.        ACC: [id number] EXAM:  XR CHEST PORTABLE URGENT 1V                          PROCEDURE DATE:  07/10/2022          INTERPRETATION:  AP chest on July 10, 2022 at 1:31 PM. Patient has sepsis.    Shallow inspiration crowds the chest.    Heart magnified bytechnique.    Rather mild atelectasis at right base medially again noted and also a   slightly left base laterally.    Chest is similar to 2021.    IMPRESSION: Unchanged chest as above.        ACC: [id number] EXAM:  ECHO TTE WO CON COMP W DOPP                          PROCEDURE DATE:  2022      < from: TTE Echo Complete w/o Contrast w/ Doppler (22 @ 09:36) >     Summary     Mild mitral annular calcification is present.   The mitral valve leaflets appear thickened.   Mild (1+) mitral regurgitation is present.   The aortic root is at the upper limits of normal in size.   The ascending aorta appears yousuf minimally dilated.   The aortic valve appears moderately calcified. Valve opening seems to be   restricted.   Peak and mean transaortic gradients are 25 and 16 mmHg respectively; this   finding is consistent with mild aortic stenosis.   Mild (1+) aortic regurgitation is present.   The tricuspid valve leaflets are thin and pliable; valve motion is   normal.   Mild (1+) tricuspid valve regurgitation is present.   Mild pulmonary hypertension.   Mild concentric left ventricular hypertrophy is present.   Left ventricle systolic function appears to be a t the lower limits of   normal in the presence of a cardiac arrhythmia. Visual estimation of left   ventricle ejection fraction is 50 %.   The right ventricle is at the upper limits of normal in size.

## 2022-07-13 NOTE — PROGRESS NOTE ADULT - SUBJECTIVE AND OBJECTIVE BOX
Quality 431: Preventive Care And Screening: Unhealthy Alcohol Use - Screening: Patient screened for unhealthy alcohol use using a single question and scores less than 2 times per year Detail Level: Detailed Quality 130: Documentation Of Current Medications In The Medical Record: Current Medications Documented Quality 226: Preventive Care And Screening: Tobacco Use: Screening And Cessation Intervention: Patient screened for tobacco use and is an ex/non-smoker 87 year old male patient with pertinent past medical history noted for Afib and CAD   presented to the ED complaining of a 3-4 day history of a progressively worsening shortness of breath and exertional dyspnea. Of note, patient recently returned from [facility] where he had been staying for a month. He developed fatigue, weakness, had been eating less, had body aches and developed coughing and audible wheezing. Patient without known sick contacts, chest pain, palpitations, nausea or vomiting.      In the ED patient found to have a pulse of 91 percent on room air. He was given IV antibiotics and IVF hydration. (10 Jul 2022 18:58)    Admitted for acute hypoxic respiratory vailure  suspicious for reactive airway disease.  Started on steroids.    BNP checked 1,500.  pt has dyspnea but no symptoms specific for CHF:  no PND, orthopnea, LE edema.  no chest pain    Reviewed last note in clinic by Dr. Legih  pt's cardiologist.  Last seen Feb '22.    ********Cardiac Cath 2/14/20 Single vessel CAD Moderate LAD disease   ********Echo 3/17/21 NL LV SYS FX EF 60% Moderate LVH, Mild MR moderate AS/Mild AI, mild Pulmonary HTN, AO 3.8 CM    7/12/22: Patient denies chest pain or shortness of breath. Just returned from echo.  Had coughing spell last night. Gave sputum specimen  7/13/'22: no complaints.    MEDICATIONS:  OUTPATIENT  Home Medications:  Aspirin Enteric Coated 81 mg oral delayed release tablet: 1 tab(s) orally once a day (10 Jul 2022 15:27)  carvedilol 12.5 mg oral tablet: 1 tab(s) orally 2 times a day (10 Jul 2022 15:27)  digoxin 125 mcg (0.125 mg) oral tablet: 1 tab(s) orally once a day (10 Jul 2022 15:27)  Multiple Vitamins oral tablet: 1 tab(s) orally once a day (10 Jul 2022 15:27)  Probiotic Formula oral capsule: 1 cap(s) orally once a day (10 Jul 2022 15:27)  rosuvastatin 10 mg oral tablet: 1 tab(s) orally once a day (10 Jul 2022 15:27)  Super B Complex oral tablet: 1 tab(s) orally once a day (10 Jul 2022 15:27)  warfarin 1 mg oral tablet: 1 tab(s) orally once a day (at bedtime), monitor INR at rehab (10 Jul 2022 15:27)      INPATIENT  MEDICATIONS  (STANDING):  aspirin enteric coated 81 milliGRAM(s) Oral daily  atorvastatin 40 milliGRAM(s) Oral at bedtime  budesonide  80 MICROgram(s)/formoterol 4.5 MICROgram(s) Inhaler 2 Puff(s) Inhalation two times a day  carvedilol 12.5 milliGRAM(s) Oral every 12 hours  cefuroxime   Tablet 500 milliGRAM(s) Oral every 12 hours  digoxin     Tablet 125 MICROGram(s) Oral daily  guaiFENesin  milliGRAM(s) Oral every 12 hours  lactobacillus acidophilus 1 Tablet(s) Oral daily  lidocaine   4% Patch 1 Patch Transdermal daily  methylPREDNISolone sodium succinate Injectable 40 milliGRAM(s) IV Push two times a day  multivitamin 1 Tablet(s) Oral daily  multivitamin 1 Tablet(s) Oral daily  polyethylene glycol 3350 17 Gram(s) Oral daily  warfarin 1 milliGRAM(s) Oral daily    MEDICATIONS  (PRN):  acetaminophen     Tablet .. 650 milliGRAM(s) Oral every 6 hours PRN Mild Pain (1 - 3)  ALBUTerol    90 MICROgram(s) HFA Inhaler 2 Puff(s) Inhalation every 6 hours PRN Shortness of Breath and/or Wheezing  aluminum hydroxide/magnesium hydroxide/simethicone Suspension 30 milliLiter(s) Oral every 4 hours PRN Dyspepsia  melatonin 3 milliGRAM(s) Oral at bedtime PRN Insomnia  ondansetron Injectable 4 milliGRAM(s) IV Push every 8 hours PRN Nausea and/or Vomiting  senna 2 Tablet(s) Oral at bedtime PRN Constipation          Vital Signs Last 24 Hrs  T(C): 36.3 (13 Jul 2022 15:37), Max: 36.4 (12 Jul 2022 20:16)  T(F): 97.3 (13 Jul 2022 15:37), Max: 97.5 (12 Jul 2022 20:16)  HR: 62 (13 Jul 2022 15:47) (62 - 93)  BP: 135/76 (13 Jul 2022 15:37) (135/76 - 150/82)  BP(mean): --  RR: 18 (13 Jul 2022 15:37) (18 - 18)  SpO2: 96% (13 Jul 2022 15:47) (92% - 96%)    Parameters below as of 13 Jul 2022 15:47  Patient On (Oxygen Delivery Method): room air    Daily     Daily I&O's Summary      I&O's Detail      I&O's Summary      PHYSICAL EXAM:    Constitutional: NAD, awake and alert, well-developed  HEENT: PERR, EOMI,  No oral cyananosis.  Neck:  supple,  No JVD  Respiratory: Breath sounds are clear bilaterally, No wheezing, rales or rhonchi  Cardiovascular: S1 and S2, regular rate and rhythm, no Murmurs, gallops or rubs  Gastrointestinal: Bowel Sounds present, soft, nontender.   Extremities: No peripheral edema. No clubbing or cyanosis.  Vascular: 2+ peripheral pulses  Neurological: A/O x 3, no focal deficits  Musculoskeletal: no calf tenderness.  Skin: No rashes.      ===============================  ===============================  LABS:                         13.4   9.43  )-----------( 142      ( 13 Jul 2022 07:11 )             41.0                         13.0   7.17  )-----------( 141      ( 12 Jul 2022 08:29 )             39.8                         13.3   4.72  )-----------( 132      ( 11 Jul 2022 10:49 )             41.2     13 Jul 2022 07:11    139    |  107    |  28     ----------------------------<  186    3.8     |  26     |  1.15   12 Jul 2022 08:29    137    |  107    |  26     ----------------------------<  132    4.6     |  25     |  1.10   11 Jul 2022 10:49    138    |  106    |  21     ----------------------------<  126    4.7     |  28     |  1.09     Ca    9.1        13 Jul 2022 07:11  Ca    9.3        12 Jul 2022 08:29  Ca    9.1        11 Jul 2022 10:49    TPro  7.5    /  Alb  3.1    /  TBili  0.6    /  DBili  x      /  AST  25     /  ALT  35     /  AlkPhos  59     11 Jul 2022 10:49    PT/INR - ( 13 Jul 2022 07:11 )   PT: 33.4 sec;   INR: 2.85 ratio         PTT - ( 13 Jul 2022 07:11 )  PTT:34.3 sec  ===============================  ===============================  CARDIAC BIOMARKERS:  BNP  Serum Pro-Brain Natriuretic Peptide: 1637 pg/mL *H* [0 - 450] (07-10-22 @ 13:57)      ===============================  ===============================  BLOOD CULTURES:    Blood Culture: Organism --  Gram Stain Blood -- Gram Stain   Few polymorphonuclear leukocytes per low power field  Few Squamous epithelial cells per low power field  Few Gram Negative Rods per oil power field  Specimen Source .Sputum Sputum  Culture-Blood --    Organism --  Gram Stain Blood -- Gram Stain --  Specimen Source .Blood None  Culture-Blood --        07-11 @ 10:49  TSH: 2.04      ===============================  ===============================  EKG: afib 90s no ischemia    TELE: afib, rate controlled.  ===============================            PROCEDURE DATE:  07/10/2022          INTERPRETATION:  AP chest on July 10, 2022 at 1:31 PM. Patient has sepsis.    Shallow inspiration crowds the chest.    Heart magnified bytechnique.    Rather mild atelectasis at right base medially again noted and also a   slightly left base laterally.    Chest is similar to December 16, 2021.    IMPRESSION: Unchanged chest as above.  ===============================    ECHO TTE WO CON COMP W DOP - 7/12/'22     BP: 135/69 mmHg     Study Location: [address] [place] [address] Quality: Fair    Indications   1) R06.00 - Dyspnea    M-Mode Measurements (cm)     LVEDd: 4.95 cm            LVESd: 4.03 cm   IVSEd: 1.18 cm   LVPWd: 1.2 cm             AO Root Dimension: 3.7 cm                             ACS: 1 cm                             LA: 4.9 cm                             LVOT: 2.2 cm    Doppler Measurements:     AV Velocity:252 cm/s                 MV Peak E-Wave: 104 cm/s   AV Peak Gradient: 25.4 mmHg   AV Mean Gradient: 16 mmHg            MV Peak Gradient: 4.33 mmHg   AV Area (Continuity):1.48 cm^2   TR Velocity:286 cm/s   TR Gradient:32.7184 mmHg   Estimated RAP:3 mmHg   RVSP:36 mmHg     Findings     Mitral Valve   Mild mitral annular calcification is present.   The mitral valve leaflets appear thickened.   Mild (1+) mitral regurgitation is present.     Aortic Valve   The aortic root is at the upper limits of normal in size.   The ascending aorta appearsto be minimally dilated.   The aortic valve appears moderately calcified. Valve opening seems to be   restricted.   Peak and mean transaortic gradients are 25 and 16 mmHg respectively; this   finding is consistent with mild aortic stenosis.   Mild (1+) aortic regurgitation is present.     Tricuspid Valve   The tricuspid valve leaflets are thin and pliable; valve motion is   normal.   Mild (1+) tricuspid valve regurgitation is present.   Mild pulmonary hypertension.     Pulmonic Valve   Normal appearing pulmonic valve structure.   Trace pulmonic valvular regurgitation is present.     Left Atrium   The left atrium is severely dilated.     Left Ventricle   Mild concentric left ventricular hypertrophy is present.   Left ventricle systolic function appears to be a t the lower limits of   normal in the presence of a cardiac arrhythmia. Visual estimation of left   ventricle ejection fraction is 50 %.     Right Atrium   The right atrium appears moderately dilated.     Right Ventricle   The right ventricle is at the upper limits of normal in size.     Pericardial Effusion   No evidence of pericardial effusion.     Pleural Effusion   No evidence of pleural effusion.     Miscellaneous   The IVC is at the upper limits of normal in size.     Impression     Summary     Mild mitral annular calcification is present.   The mitral valve leaflets appear thickened.   Mild (1+) mitral regurgitation is present.   The aortic root is at the upper limits of normal in size.   The ascending aorta appears yousuf minimally dilated.   The aortic valve appears moderately calcified. Valve opening seems to be   restricted.   Peak and mean transaortic gradients are 25 and 16 mmHg respectively; this   finding is consistent with mild aortic stenosis.   Mild (1+) aortic regurgitation is present.   The tricuspid valve leaflets are thin and pliable; valve motion is   normal.   Mild (1+) tricuspid valve regurgitation is present.   Mild pulmonary hypertension.   Mild concentric left ventricular hypertrophy is present.   Left ventricle systolic function appears to be a t the lower limits of   normal in the presence of a cardiac arrhythmia. Visual estimation of left   ventricle ejection fraction is 50 %.   The right ventricle is at the upper limits of normal in size.     Signature     ----------------------------------------------------------------   Electronically signed by Joe Tong MD(Interpreting   physician) on 07/12/2022 03:49 PM   ----------------------------------------------------------------    =================================    Echo 3/17/21 NL LV SYS FX EF 60% Moderate LVH, Mild MR moderate AS/Mild AI, mild Pulmonary HTN, AO 3.8 CM    ===============================      Brendon Barbosa M.D.  Cardiology, [facility]  Cell: [phone]  Offices:   [phone] ([facility] Office)  [phone] ([facility] Office)

## 2022-07-13 NOTE — PROGRESS NOTE ADULT - ASSESSMENT
87 year old male patient with pertinent past medical history noted for Afib and CAD presented to the ED complaining of a 3-4 day history of a progressively worsening shortness of breath and exertional dyspnea. Of note, patient recently returned from [facility] where he had been staying for a month. He developed fatigue, weakness, had been eating less, had body aches and developed coughing and audible wheezing. Patient without known sick contacts, chest pain, palpitations, nausea or vomiting.      In the ED patient found to have a pulse of 91 percent on room air. He was given IV antibiotics and IVF hydration. (10 Jul 2022 18:58)    pt is known to me from outpt eval  seen and examined data discussed  no cp  pos cough and congestion    Assessment / plan:  Acute bronchitis / bronchiolitis with bronchospasm  Mucus plugging as cont factor  diastolic CHF  basilar atelectasis due to above  adventitious sounds on exam bilateral lower lobes, r/o pneumonia  Hx DVT and Afib on AC / therapeutic INR    agree with steroids  bronchodilator rx  ct scan chest for eval  fu sputum cx / may consider adding antibiotics if persistent cough or pos cultures/ infiltrate on ct scan  walk test for eval hypoxemia on exertion  7/13  pos residual cough and congestion  no distress  data discussed with hospitalist  overall feels better

## 2022-07-14 ENCOUNTER — TRANSCRIPTION ENCOUNTER (OUTPATIENT)
Age: 87
End: 2022-07-14

## 2022-07-14 VITALS
RESPIRATION RATE: 18 BRPM | OXYGEN SATURATION: 93 % | SYSTOLIC BLOOD PRESSURE: 125 MMHG | TEMPERATURE: 97 F | HEART RATE: 86 BPM | DIASTOLIC BLOOD PRESSURE: 83 MMHG

## 2022-07-14 LAB
APTT BLD: 39.6 SEC — HIGH (ref 27.5–35.5)
CULTURE RESULTS: SIGNIFICANT CHANGE UP
INR BLD: 4.21 RATIO — HIGH (ref 0.88–1.16)
PROTHROM AB SERPL-ACNC: 49.5 SEC — HIGH (ref 10.5–13.4)
SPECIMEN SOURCE: SIGNIFICANT CHANGE UP

## 2022-07-14 PROCEDURE — 99239 HOSP IP/OBS DSCHRG MGMT >30: CPT

## 2022-07-14 RX ORDER — CEFUROXIME AXETIL 250 MG
1 TABLET ORAL
Qty: 6 | Refills: 0
Start: 2022-07-14 | End: 2022-07-16

## 2022-07-14 RX ORDER — LIDOCAINE 4 G/100G
1 CREAM TOPICAL
Qty: 1 | Refills: 0
Start: 2022-07-14 | End: 2022-07-18

## 2022-07-14 RX ORDER — BUDESONIDE AND FORMOTEROL FUMARATE DIHYDRATE 160; 4.5 UG/1; UG/1
2 AEROSOL RESPIRATORY (INHALATION)
Qty: 1 | Refills: 0
Start: 2022-07-14 | End: 2022-08-12

## 2022-07-14 RX ORDER — POLYETHYLENE GLYCOL 3350 17 G/17G
17 POWDER, FOR SOLUTION ORAL
Qty: 0 | Refills: 0 | DISCHARGE
Start: 2022-07-14

## 2022-07-14 RX ORDER — WARFARIN SODIUM 2.5 MG/1
1 TABLET ORAL
Qty: 0 | Refills: 0 | DISCHARGE

## 2022-07-14 RX ORDER — ALBUTEROL 90 UG/1
2 AEROSOL, METERED ORAL
Qty: 1 | Refills: 0
Start: 2022-07-14 | End: 2022-08-12

## 2022-07-14 RX ADMIN — Medication 40 MILLIGRAM(S): at 10:59

## 2022-07-14 RX ADMIN — CARVEDILOL PHOSPHATE 12.5 MILLIGRAM(S): 80 CAPSULE, EXTENDED RELEASE ORAL at 10:58

## 2022-07-14 RX ADMIN — Medication 81 MILLIGRAM(S): at 10:58

## 2022-07-14 RX ADMIN — Medication 1 TABLET(S): at 10:59

## 2022-07-14 RX ADMIN — Medication 500 MILLIGRAM(S): at 10:58

## 2022-07-14 RX ADMIN — Medication 600 MILLIGRAM(S): at 10:58

## 2022-07-14 RX ADMIN — BUDESONIDE AND FORMOTEROL FUMARATE DIHYDRATE 2 PUFF(S): 160; 4.5 AEROSOL RESPIRATORY (INHALATION) at 09:25

## 2022-07-14 RX ADMIN — Medication 125 MICROGRAM(S): at 10:58

## 2022-07-14 RX ADMIN — Medication 1 TABLET(S): at 10:58

## 2022-07-14 NOTE — DISCHARGE NOTE PROVIDER - PROVIDER TOKENS
PROVIDER:[TOKEN:[[id number]],FOLLOWUP:[1-3 days]],PROVIDER:[TOKEN:[[id number]],FOLLOWUP:[1 week]],PROVIDER:[TOKEN:[[id number]],FOLLOWUP:[2 weeks]]

## 2022-07-14 NOTE — DISCHARGE NOTE PROVIDER - NSDCMRMEDTOKEN_GEN_ALL_CORE_FT
albuterol 90 mcg/inh inhalation aerosol: 2 puff(s) inhaled every 6 hours, As Needed -Shortness of Breath and/or Wheezing - for bronchospasm   Aspirin Enteric Coated 81 mg oral delayed release tablet: 1 tab(s) orally once a day  budesonide-formoterol 80 mcg-4.5 mcg/inh inhalation aerosol: 2 puff(s) inhaled 2 times a day   carvedilol 12.5 mg oral tablet: 1 tab(s) orally 2 times a day  cefuroxime 500 mg oral tablet: 1 tab(s) orally 2 times a day   digoxin 125 mcg (0.125 mg) oral tablet: 1 tab(s) orally once a day  guaiFENesin 600 mg oral tablet, extended release: 1 tab(s) orally every 12 hours, As Needed -for cough   lidocaine 4% topical film: Apply topically to affected area once a day, As Needed  -for moderate pain   Multiple Vitamins oral tablet: 1 tab(s) orally once a day  polyethylene glycol 3350 oral powder for reconstitution: 17 gram(s) orally once a day, As Needed for constipation   predniSONE 10 mg oral tablet: 4 tab(s) PO QD x 2 days  3 tab(s) PO QD x 2 days  2 tab(s) PO QD  x 2 days  1 tab(s) PO QD x 2 days  Probiotic Formula oral capsule: 1 cap(s) orally once a day  rosuvastatin 10 mg oral tablet: 1 tab(s) orally once a day  Super B Complex oral tablet: 1 tab(s) orally once a day  warfarin 1 mg oral tablet: Hold coumadin tonight and tomorrow night, check INR in 2 days and discuss results with cardio for further recommendations

## 2022-07-14 NOTE — DISCHARGE NOTE PROVIDER - NSDCFUSCHEDAPPT_GEN_ALL_CORE_FT
Riri Winn  [facility] Physician [facility]  Neurology [address]  Scheduled Appointment: 09/23/2022

## 2022-07-14 NOTE — DISCHARGE NOTE PROVIDER - HOSPITAL COURSE
87 year old male patient with past medical history of  Chronic Afib, CAD, Pre Diabetes, HLD, DVT, Factor V Laden  presented to the ED complaining of a 3-4 day history of a progressively worsening shortness of breath and exertional dyspnea. Of note, patient recently returned from [facility] where he had been staying for a month. He developed fatigue, weakness, had been eating less, +  loose stools,  dizziness and near syncopal episode, + body aches, he then further developed coughing and audible wheezing. Patient without known sick contacts, chest pain, palpitations, nausea or vomiting.  In the ED: VS stable,  pulse ox noted to be at 91% at rest on RA, improved with NC. Pt was wheezing on exam.  Pt was   given IV antibiotics and IVF hydration. Pt was admitted for further management. Pts CXR was neg, CT chest was done, mo PNA but + bronchiolar impaction. no PNA. Pt was Tx with IV Steroids, inhalers and Po abxs. Followed by Pulm, likely acute bronchitis and Bronchiolitis. Pt was also evaluated by cardio, unlikely CHF. ECHO with preserved EF. Mild valvular Dz, mild Pulm HTN. Respiratory status improved, on RA, was evaluated for home O2 by RT but did not qualify.   Today Pt reports feeling better, no SOB at rest or ambulation, no wheezing, some residual cough, D/c planning and outPt f/u discussed     Vital Signs Last 24 Hrs  T(C): 36.3 (14 Jul 2022 08:28), Max: 36.4 (13 Jul 2022 20:33)  T(F): 97.4 (14 Jul 2022 08:28), Max: 97.5 (13 Jul 2022 20:33)  HR: 76 (14 Jul 2022 08:46) (62 - 90)  BP: 125/83 (14 Jul 2022 08:28) (125/83 - 142/83)  RR: 18 (14 Jul 2022 08:28) (18 - 18)  SpO2: 93% (14 Jul 2022 08:28) (93% - 96%)    Parameters below as of 14 Jul 2022 08:28  Patient On (Oxygen Delivery Method): room air      PHYSICAL EXAM:  General: Well developed; in no acute distress, on RA. [place]  Eyes: EOMI; conjunctiva and sclera clear  Head: Normocephalic; atraumatic  ENMT: No nasal discharge; airway clear  Neck: Supple; no JVD   Respiratory: Decreased BS LLL, no wheezing   Cardiovascular: Irregular rate and rhythm. S1 and S2 Normal;   Gastrointestinal: Soft non-tender non-distended; Normal bowel sounds  Genitourinary: No  suprapubic  tenderness  Extremities: No  edema  Vascular: Peripheral pulses palpable 2+ bilaterally  Neurological: Alert and oriented x3, non focal   Skin: Warm and dry. No acute rash  Musculoskeletal: Normal muscle tone, without deformities  Psychiatric: Cooperative and appropriate    87 year old male patient with pertinent past medical history  Chronic Afib, CAD, Pre Diabetes, HLD, DVT, Factor V Laden admitted for:       1. SOB, Acute Hypoxic respiratory failure, resolved.  Likely related to reactive airway Dz. Likely bacterial acute bronchitis   CXR: no PNA, R lower lung atelectasis   CT chest no PNA, + secretion in airways   Sputum Cx; Few GNR  Monitor Pulse ox,>92 on RA    On IV Solumedrol, will switch to Po Prednisone taper   C/w  albuterol and Symbicort  Mucinex  C/w PO ceftin   ECHO: EF 50%, mild AS/AR/TR, mild Pulm HTN   D/w DR Barbosa  ECHO results, as per his assessment normal EF, no changes from past   Pt to f/u with Dr Wilcox outPt     2.  Diarrhea, resolved   Possibly viral syndrome?   COVID neg   Infl and RVP neg   F/u BCX x 2: NGTD  UCX: NG   Supportive care     3. Chronic AFIB   C/w Digoxin and carvedilol  C/w Coumadin, monitor INR daily,  supraTx today   Hold Coumadin x 2 days, check INR in 2 days, f/u with cardio for further recs        4. CAD, HTN   Stable  C/w ASA, lipitor, Carvedilol     5. DVT PPX: Tx INR     Dispo:  stable for d/c home today with HC   Total time 40 min   Fax d/c summary to PCP

## 2022-07-14 NOTE — PROGRESS NOTE ADULT - ASSESSMENT
87 year old male patient with pertinent past medical history noted for Afib and CAD presented to the ED complaining of a 3-4 day history of a progressively worsening shortness of breath and exertional dyspnea. Of note, patient recently returned from [facility] where he had been staying for a month. He developed fatigue, weakness, had been eating less, had body aches and developed coughing and audible wheezing. Patient without known sick contacts, chest pain, palpitations, nausea or vomiting.      In the ED patient found to have a pulse of 91 percent on room air. He was given IV antibiotics and IVF hydration. (10 Jul 2022 18:58)    pt is known to me from outpt eval  seen and examined data discussed  no cp  pos cough and congestion    Assessment / plan:  Acute bronchitis / bronchiolitis with bronchospasm  Mucus plugging as cont factor  diastolic CHF  basilar atelectasis due to above  adventitious sounds on exam bilateral lower lobes, r/o pneumonia  Hx DVT and Afib on AC / therapeutic INR    agree with steroids  bronchodilator rx  ct scan chest for eval  fu sputum cx / may consider adding antibiotics if persistent cough or pos cultures/ infiltrate on ct scan  walk test for eval hypoxemia on exertion  7/13  pos residual cough and congestion  no distress  data discussed with hospitalist  overall feels better  7/14  no hypoxemia on exertion  doing better clinically  no distress  DC planning as per primary team  taper steroids off as outpt  fu with dr COUCH  repeat ct chest in 3 months  will discuss with his wife

## 2022-07-14 NOTE — PROGRESS NOTE ADULT - SUBJECTIVE AND OBJECTIVE BOX
Patient is a 87y old  Male who presents with a chief complaint of Acute Hypoxic Respiratory failure (2022 18:23)      HPI:  87 year old male patient with pertinent past medical history noted for Afib and CAD presented to the ED complaining of a 3-4 day history of a progressively worsening shortness of breath and exertional dyspnea. Of note, patient recently returned from [facility] where he had been staying for a month. He developed fatigue, weakness, had been eating less, had body aches and developed coughing and audible wheezing. Patient without known sick contacts, chest pain, palpitations, nausea or vomiting.      In the ED patient found to have a pulse of 91 percent on room air. He was given IV antibiotics and IVF hydration. (10 Jul 2022 18:58)    pt is known to me from outpt eval  seen and examined data discussed  no cp  pos cough and congestion      pos residual cough and congestion  no distress  data discussed with hospitalist  overall feels better    no hypoxemia on exertion  doing better clinically  no distress  DC planning as per primary team  taper steroids off as outpt  fu with dr COUCH  repeat ct chest in 3 months  will discuss with his wife  PAST MEDICAL & SURGICAL HISTORY:  Afib      DVT (deep venous thrombosis)      Hyperlipemia      H/O cardiomyopathy      Factor V Leiden      History of appendectomy          MEDICATIONS  (STANDING):  albuterol/ipratropium for Nebulization 3 milliLiter(s) Nebulizer every 6 hours  aspirin enteric coated 81 milliGRAM(s) Oral daily  atorvastatin 40 milliGRAM(s) Oral at bedtime  carvedilol 12.5 milliGRAM(s) Oral every 12 hours  cefuroxime   Tablet 500 milliGRAM(s) Oral every 12 hours  digoxin     Tablet 125 MICROGram(s) Oral daily  guaiFENesin  milliGRAM(s) Oral every 12 hours  lactobacillus acidophilus 1 Tablet(s) Oral daily  methylPREDNISolone sodium succinate Injectable 40 milliGRAM(s) IV Push two times a day  multivitamin 1 Tablet(s) Oral daily  multivitamin 1 Tablet(s) Oral daily    MEDICATIONS  (PRN):  acetaminophen     Tablet .. 650 milliGRAM(s) Oral every 6 hours PRN Mild Pain (1 - 3)  ALBUTerol    90 MICROgram(s) HFA Inhaler 2 Puff(s) Inhalation every 6 hours PRN Shortness of Breath and/or Wheezing  aluminum hydroxide/magnesium hydroxide/simethicone Suspension 30 milliLiter(s) Oral every 4 hours PRN Dyspepsia  melatonin 3 milliGRAM(s) Oral at bedtime PRN Insomnia  ondansetron Injectable 4 milliGRAM(s) IV Push every 8 hours PRN Nausea and/or Vomiting      FAMILY HISTORY:  FH: diabetes mellitus    FH: breast cancer        SOCIAL HISTORY:  ***    REVIEW OF SYSTEM:  Pertinent items are noted in HPI.    Vital Signs Last 24 Hrs  T(C): 36.4 (2022 20:33), Max: 36.4 (2022 20:33)  T(F): 97.5 (2022 20:33), Max: 97.5 (2022 20:33)  HR: 83 (:33) (62 - 90)  BP: 142/83 (2022 20:33) (135/76 - 144/84)  BP(mean): --  RR: 18 (2022 20:33) (18 - 18)  SpO2: 94% (:33) (93% - 96%)    Parameters below as of 2022 20:33  Patient On (Oxygen Delivery Method): room air      Parameters below as of 2022 21:01  Patient On (Oxygen Delivery Method): room air        I&O's Detail    PHYSICAL EXAM  General Appearance: cooperative, no acute distress,   HEENT: PERRL, conjunctiva clear, EOM's intact, non injected pharynx, no exudate, TM   normal  Neck: Supple, , no adenopathy, thyroid: not enlarged, no carotid bruit or JVD  Back: Symmetric, no  tenderness,no soft tissue tenderness  Lungs: bilateral lower lobe rhonchi and exp wheeze  Heart: Regular rate and rhythm, S1, S2 normal, no murmur, rub or gallop  Abdomen: Soft, non-tender, bowel sounds active , no hepatosplenomegaly  Extremities: no cyanosis or edema, no joint swelling  Skin: Skin color, texture normal, no rashes   Neurologic: Alert and oriented X3 , cranial nerves intact, sensory and motor normal,    ECG:    LABS:                          13.3   4.72  )-----------( 132      ( 2022 10:49 )             41.2     07-11    138  |  106  |  21  ----------------------------<  126<H>  4.7   |  28  |  1.09    Ca    9.1      2022 10:49    TPro  7.5  /  Alb  3.1<L>  /  TBili  0.6  /  DBili  x   /  AST  25  /  ALT  35  /  AlkPhos  59  07-11          Pro BNP  1637 07-10 @ 13:57  D Dimer  -- 07-10 @ 13:57    PT/INR - ( 2022 10:49 )   PT: 24.9 sec;   INR: 2.13 ratio         PTT - ( 10 Jul 2022 13:57 )  PTT:34.9 sec  Urinalysis Basic - ( 10 Jul 2022 13:57 )    Color: Yellow / Appearance: Clear / S.025 / pH: x  Gluc: x / Ketone: Negative  / Bili: Negative / Urobili: 1   Blood: x / Protein: 30 mg/dL / Nitrite: Negative   Leuk Esterase: Moderate / RBC: 3-5 /HPF / WBC 6-10   Sq Epi: x / Non Sq Epi: Occasional / Bacteria: Few            RADIOLOGY & ADDITIONAL STUDIES:  < from: CT Head No Cont (07.10.22 @ 22:19) >  COMPARISON STUDY: None    FINDINGS:  There is no CT evidence of acute intracranial hemorrhage, subdural   collection, mass effect or acute territorial infarct.    There is moderate generalized cerebral volume loss and mild   periventricular white matter hypoattenuation compatible with   microvascular ischemic disease.      There is minimal ethmoid air cell mucosal thickening..    The mastoids are clear bilaterally..    The calvarium, skull base, and orbits appear within normal limits.    .    IMPRESSION:  No CT evidence of acute intracranial pathology.  The patient's soft   tissue injury is not clearly visualized on the CT scan.    < end of copied text >  < from: Xray Chest 1 View- PORTABLE-Urgent (07.10.22 @ 13:38) >  PROCEDURE DATE:  07/10/2022          INTERPRETATION:  AP chest on July 10, 2022 at 1:31 PM. Patient has sepsis.    Shallow inspiration crowds the chest.    Heart magnified bytechnique.    Rather mild atelectasis at right base medially again noted and also a   slightly left base laterally.    Chest is similar to 2021.    IMPRESSION: Unchanged chest as above.    < end of copied text >

## 2022-07-14 NOTE — PROGRESS NOTE ADULT - REASON FOR ADMISSION
Acute Hypoxic Respiratory failure

## 2022-07-14 NOTE — DISCHARGE NOTE PROVIDER - CARE PROVIDER_API CALL
Vinny Loja)  Cardiovascular Disease  [address], [address]  [place] [address]  Phone: [phone]  Fax: [phone]  Follow Up Time: 1-3 days    Arina Javed)  Internal Medicine  [address] [place] [address]  Phone: [phone]  Fax: [phone]  Follow Up Time: 1 week    LISSETTE Wilcox ()  Pulmonary Disease; Sleep Medicine  [address] [place] [address]  Phone: [phone]  Fax: [phone]  Follow Up Time: 2 weeks

## 2022-07-14 NOTE — DISCHARGE NOTE NURSING/CASE MANAGEMENT/SOCIAL WORK - NSDCPEFALRISK_GEN_ALL_CORE
For information on Fall & Injury Prevention, visit: [email or web address] OR  [email or web address] OR  https://www.cdc.gov/steadi/patient.html

## 2022-07-14 NOTE — DISCHARGE NOTE PROVIDER - NSDCCPCAREPLAN_GEN_ALL_CORE_FT
PRINCIPAL DISCHARGE DIAGNOSIS  Diagnosis: Acute bronchitis and bronchiolitis  Assessment and Plan of Treatment: improving  C/w Oral prednisone taper   C/w Oral Abxs   C/w Inhalers   F/u with  Pulm, will need to repeat CT chest in few months      SECONDARY DISCHARGE DIAGNOSES  Diagnosis: Chronic atrial fibrillation  Assessment and Plan of Treatment: C/w Carvedilol  Hold coumadin for 2 days and repeat INR on 7/16, f/u wirg Dr nunez for further recommendations.

## 2022-07-14 NOTE — DISCHARGE NOTE NURSING/CASE MANAGEMENT/SOCIAL WORK - PATIENT PORTAL LINK FT
You can access the FollowMyHealth Patient Portal offered by [facility] by registering at the following website: [email or web address]. By joining [email or web address] FollowMyHealth portal, you will also be able to view your health information using other applications (apps) compatible with our system.

## 2022-07-14 NOTE — DISCHARGE NOTE PROVIDER - CARE PROVIDERS DIRECT ADDRESSES
[email or web address]

## 2022-07-15 ENCOUNTER — APPOINTMENT (OUTPATIENT)
Dept: CARE COORDINATION | Facility: HOME HEALTH | Age: 87
End: 2022-07-15

## 2022-07-15 LAB
CULTURE RESULTS: SIGNIFICANT CHANGE UP
CULTURE RESULTS: SIGNIFICANT CHANGE UP
SPECIMEN SOURCE: SIGNIFICANT CHANGE UP
SPECIMEN SOURCE: SIGNIFICANT CHANGE UP

## 2022-07-18 ENCOUNTER — APPOINTMENT (OUTPATIENT)
Dept: CARE COORDINATION | Facility: HOME HEALTH | Age: 87
End: 2022-07-18

## 2022-07-18 ENCOUNTER — LABORATORY RESULT (OUTPATIENT)
Age: 87
End: 2022-07-18

## 2022-07-18 VITALS
OXYGEN SATURATION: 96 % | HEART RATE: 62 BPM | SYSTOLIC BLOOD PRESSURE: 128 MMHG | DIASTOLIC BLOOD PRESSURE: 80 MMHG | RESPIRATION RATE: 18 BRPM

## 2022-07-18 DIAGNOSIS — J20.9 ACUTE BRONCHITIS, UNSPECIFIED: ICD-10-CM

## 2022-07-18 PROCEDURE — 99496 TRANSJ CARE MGMT HIGH F2F 7D: CPT

## 2022-07-18 RX ORDER — ROSUVASTATIN CALCIUM 10 MG/1
10 TABLET, FILM COATED ORAL DAILY
Refills: 0 | Status: ACTIVE | COMMUNITY

## 2022-07-18 RX ORDER — CLINDAMYCIN HYDROCHLORIDE 300 MG/1
300 CAPSULE ORAL
Qty: 20 | Refills: 0 | Status: COMPLETED | COMMUNITY
Start: 2022-03-28

## 2022-07-18 RX ORDER — CEFUROXIME AXETIL 500 MG/1
500 TABLET ORAL
Qty: 6 | Refills: 0 | Status: COMPLETED | COMMUNITY
Start: 2022-07-14

## 2022-07-19 LAB
INR PPP: 3.17
PROTHROMBIN TIME COMMENT: NORMAL
PT BLD: 37.8

## 2022-07-19 NOTE — ASSESSMENT
[FreeTextEntry1] : Pt is being seen after discharge home from [facility]. He was admitted on 07/10/2022 for SOB/Diarrhea and discharged home on 07/14/2022. Discharge medications were reviewed and reconciled with the current medication list and medications in home.\par \par CC:\par Pt is seen at home s/p recent admission for Acute Bronchitis. Pt is temporarily unable to leave home as it requires a considerable and taxing effort, exhibits unsteady gait and needs assistive device to leave home.\par HPI:\par Pt Is a 86 y/o male enrolled in the [facility] program seen at home s/p a recent admission for Acute Bronchitis. Pt was contacted by TCM and med rec was done within 48 hours of DC.\par \par Hospital Course	\par PMH: Chronic Afib, CAD, Pre Diabetes, HLD, DVT, Factor V Laden  presented to the ED complaining of a 3-4 day history of a progressively worsening shortness of breath and exertional dyspnea. Of note, patient recently returned from [facility] where he had been staying for a month. He developed fatigue, weakness, had been eating less, +  loose stools,  dizziness and near syncopal episode, + body aches, he then further developed coughing and audible wheezing. Patient without known sick contacts, chest pain, palpitations, nausea or vomiting.\par In the ED: VS stable,  pulse ox noted to be at 91% at rest on RA, improved with NC. Pt was wheezing on exam.  Pt was   given IV antibiotics and IVF hydration. Pt was admitted for further management. Pts CXR was neg, CT chest was done, no PNA but + bronchiolar impaction. . Pt was Tx with IV Steroids, inhalers and PO abxs. Followed by Pulm, likely acute bronchitis and Bronchiolitis. Pt was also evaluated by cardio, unlikely CHF. ECHO with preserved EF. Mild valvular Dz, mild Pulm HTN. Respiratory status improved, on RA, was evaluated for home O2 by RT but did not qualify. \par \par Upon examination A&O x 3 NAD. Denies CP, SOB, headache, dizziness, pain, abd discomfort or difficulty with bowel or bladder. Wife present for exam. No further diarrhea, patient completed po ceftin on discharge and continues on prednisone taper. Has f/u appt with Pulm Dr. Wilcox on 7/22. [facility] services in home.

## 2022-07-19 NOTE — HISTORY OF PRESENT ILLNESS
[Post-hospitalization from ___ Hospital] : Post-hospitalization from [unfilled] Hospital [Admitted on: ___] : The patient was admitted on [unfilled] [Discharged on ___] : discharged on [unfilled] [Discharge Summary] : discharge summary [Discharge Med List] : discharge medication list [Med Reconciliation] : medication reconciliation has been completed [Patient Contacted By: ____] : and contacted by [unfilled] [FreeTextEntry2] : [facility]par Discharge Note Provider [Charted Location: [facility] [address] [place] [address] 02] [Authored: 14-Jul-2022 13:32]- for Visit: [id number], Complete, Entered, Signed in Full, Available to Patient\par \par \par  Hospital Course:\par Discharge Date	14-Jul-2022\par Admission Date	10-Jul-2022 14:54\par Reason for Admission	Acute Hypoxic Respiratory failure\par Hospital Course	\par 87 year old male patient with past medical history of  Chronic Afib, CAD, Pre Diabetes, HLD, DVT, Factor V Laden  presented to the ED complaining of a 3-4 day history of a progressively worsening shortness of breath and exertional dyspnea. Of note, patient recently returned from [facility] where he had been staying for a month. He developed fatigue, weakness, had been eating less, +  loose stools,  dizziness and near syncopal episode, + body aches, he then further developed coughing and audible wheezing. Patient without known sick contacts, chest pain, palpitations, nausea or vomiting.\par In the ED: VS stable,  pulse ox noted to be at 91% at rest on RA, improved with NC. Pt was wheezing on exam.  Pt was   given IV antibiotics and IVF hydration. Pt was admitted for further management. Pts CXR was neg, CT chest was done, mo PNA but + bronchiolar impaction. no PNA. Pt was Tx with IV Steroids, inhalers and Po abxs. Followed by Pulm, likely acute bronchitis and Bronchiolitis. Pt was also evaluated by cardio, unlikely CHF. ECHO with preserved EF. Mild valvular Dz, mild Pulm HTN. Respiratory status improved, on RA, was evaluated for home O2 by RT but did not qualify. \par Today Pt reports feeling better, no SOB at rest or ambulation, no wheezing, some residual cough, D/c planning and outPt f/u discussed \par \par Vital Signs Last 24 Hrs\par T(C): 36.3 (14 Jul 2022 08:28), Max: 36.4 (13 Jul 2022 20:33)\par T(F): 97.4 (14 Jul 2022 08:28), Max: 97.5 (13 Jul 2022 20:33)\par HR: 76 (14 Jul 2022 08:46) (62 - 90)\par BP: 125/83 (14 Jul 2022 08:28) (125/83 - 142/83)\par RR: 18 (14 Jul 2022 08:28) (18 - 18)\par SpO2: 93% (14 Jul 2022 08:28) (93% - 96%)\par \par Parameters below as of 14 Jul 2022 08:28\par Patient On (Oxygen Delivery Method): room air\par \par \par PHYSICAL EXAM:\par General: Well developed; in no acute distress, on RA. [place]\par Eyes: EOMI; conjunctiva and sclera clear\par Head: Normocephalic; atraumatic\par ENMT: No nasal discharge; airway clear\par Neck: Supple; no JVD \par Respiratory: Decreased BS LLL, no wheezing \par Cardiovascular: Irregular rate and rhythm. S1 and S2 Normal; \par Gastrointestinal: Soft non-tender non-distended; Normal bowel sounds\par Genitourinary: No  suprapubic  tenderness\par Extremities: No  edema\par Vascular: Peripheral pulses palpable 2+ bilaterally\par Neurological: Alert and oriented x3, non focal \par Skin: Warm and dry. No acute rash\par Musculoskeletal: Normal muscle tone, without deformities\par Psychiatric: Cooperative and appropriate\par \par 87 year old male patient with pertinent past medical history  Chronic Afib, CAD, Pre Diabetes, HLD, DVT, Factor V Laden admitted for: \par \par \par 1. SOB, Acute Hypoxic respiratory failure, resolved.  Likely related to reactive airway Dz. Likely bacterial acute bronchitis \par CXR: no PNA, R lower lung atelectasis \par CT chest no PNA, + secretion in airways \par Sputum Cx; Few GNR\par Monitor Pulse ox,>92 on RA  \par On IV Solumedrol, will switch to Po Prednisone taper \par C/w  albuterol and Symbicort\par Mucinex\par C/w PO ceftin \par ECHO: EF 50%, mild AS/AR/TR, mild Pulm HTN \par D/w DR Barbosa  ECHO results, as per his assessment normal EF, no changes from past \par Pt to f/u with Dr Wilcox outPt \par \par 2.  Diarrhea, resolved \par Possibly viral syndrome? \par COVID neg \par Infl and RVP neg \par F/u BCX x 2: NGTD\par UCX: NG \par Supportive care \par \par 3. Chronic AFIB \par C/w Digoxin and carvedilol\par C/w Coumadin, monitor INR daily,  supraTx today \par Hold Coumadin x 2 days, check INR in 2 days, f/u with cardio for further recs  \par \par \par 4. CAD, HTN \par Stable\par C/w ASA, lipitor, Carvedilol \par \par 5. DVT PPX: Tx INR \par \par Dispo:  stable for d/c home today with HC \par Total time 40 min \par Fax d/c summary to PCP\par \par  Med Reconciliation:\par Medication Reconciliation Status	Admission Reconciliation is Completed\par Discharge Reconciliation is Completed\par Discharge Medications	albuterol 90 mcg/inh inhalation aerosol: 2 puff(s) inhaled every 6 hours, As Needed -Shortness of Breath and/or Wheezing - for bronchospasm \par Aspirin Enteric Coated 81 mg oral delayed release tablet: 1 tab(s) orally once a day\par budesonide-formoterol 80 mcg-4.5 mcg/inh inhalation aerosol: 2 puff(s) inhaled 2 times a day \par carvedilol 12.5 mg oral tablet: 1 tab(s) orally 2 times a day\par cefuroxime 500 mg oral tablet: 1 tab(s) orally 2 times a day \par digoxin 125 mcg (0.125 mg) oral tablet: 1 tab(s) orally once a day\par guaiFENesin 600 mg oral tablet, extended release: 1 tab(s) orally every 12 hours, As Needed -for cough \par lidocaine 4% topical film: Apply topically to affected area once a day, As Needed  -for moderate pain \par Multiple Vitamins oral tablet: 1 tab(s) orally once a day\par polyethylene glycol 3350 oral powder for reconstitution: 17 gram(s) orally once a day, As Needed for constipation \par predniSONE 10 mg oral tablet: 4 tab(s) PO QD x 2 days\par 3 tab(s) PO QD x 2 days\par 2 tab(s) PO QD  x 2 days\par 1 tab(s) PO QD x 2 days\par Probiotic Formula oral capsule: 1 cap(s) orally once a day\par rosuvastatin 10 mg oral tablet: 1 tab(s) orally once a day\par Super B Complex oral tablet: 1 tab(s) orally once a day\par warfarin 1 mg oral tablet: Hold coumadin tonight and tomorrow night, check INR in 2 days and discuss results with cardio for further recommendations\par ,\par ,\par \par  Care Plan/Procedures:\par Discharge Diagnoses, Assessment and Plan of Treatment	PRINCIPAL DISCHARGE DIAGNOSIS\par Diagnosis: Acute bronchitis and bronchiolitis\par Assessment and Plan of Treatment: improving\par C/w Oral prednisone taper \par C/w Oral Abxs \par C/w Inhalers \par F/u with  Pulm, will need to repeat CT chest in few months\par \par \par SECONDARY DISCHARGE DIAGNOSES\par Diagnosis: Chronic atrial fibrillation\par Assessment and Plan of Treatment: C/w Carvedilol\par Hold coumadin for 2 days and repeat INR on 7/16, f/u wirg Dr nunez for further recommendations.\par Goal(s)	To get better and follow your care plan as instructed.\par \par  Follow Up:\par Care Providers for Follow up (PCP/Outpatient Provider)	Vinny Nunez)\par Cardiovascular Disease\par [address]\[place] [address]\par Phone: [phone]\par Fax: [phone]\par Follow Up Time: 1-3 days\par \par Arina Javed)\par Internal Medicine\par [address]\[place] [address]\par Phone: [phone]\par Fax: [phone]\par Follow Up Time: 1 week\par \par LISSETTE Wilcox ()\par Pulmonary Disease; Sleep Medicine\par [address]\[place] [address]\par Phone: [phone]\par Fax: [phone]\par Follow Up Time: 2 weeks\par Patient's Scheduled Appointments	Riri Winn\par [facility]\par Neurology [address]\par Scheduled Appointment: 09/23/2022\par Discharge Diet	Consistent Carbohydrate Diabetic Diets\par Activity	as tolerated\par \par  Quality Measures:\par Patient Condition	Stable\par Hospice Patient	No\par Tobacco Usage Within the Last Year	No\par Does the patient have difficulty running errands alone like visiting a doctor’s office or shopping?	Yes\par Does the patient have difficulty climbing stairs?	No\par Cognition: The patient has	No difficulties\par Does the patient have a principal diagnosis of ischemic stroke, hemorrhagic stroke, or TIA?	No\par Does the patient have a principal diagnosis of Acute Myocardial Infarction?	No\par Has the patient had a Percutaneous Coronary Intervention?	No\par \par  Home Health:\par Discharged with Home Health Care Services?	Yes\par Face-To-Face Contact	As certified below, I, or a nurse practitioner or physician assistant working with me, had a face-to-face encounter that meets the physician face-to-face encounter requirements.\par Need for Skilled Services	Medication teaching and assessment  Observation and assessment\par Based on the above findings, the following intermittent skilled services are medically necessary home health services:	Nursing\par Home Bound Status	Fall risk\par Patient Needs Assistance to Leave Residence...\par Requires assistance of another person to leave home due to:	Fall risk\par Attending Certification	My signature below certifies that the above stated patient is homebound and upon completion of the Face-To-Face encounter, has the need for intermittent skilled nursing, physical therapy and/or speech or occupational therapy services in their home for their current diagnosis as outlined in their initial plan of care. These services will continue to be monitored by myself or another physician.\par Encounter Date	14-Jul-2022\par \par  Document Complete:\par Care Provider Seen in Hospital	Jennifer Leslie\par Physician Section Complete	This document is complete and the patient is ready for discharge.\par For questions about your prescriptions, please call:	[phone]\par Is this contact telephone number correct?	Yes\par Attending Attestation Statement	I have personally seen and examined the patient. I have collaborated with and supervised the\par .	on the discharge service for the patient. I have reviewed and made amendments to the documentation where necessary.\par \par \par \par Electronic Signatures:\par Jennifer Leslie)  (Signed 14-Jul-2022 14:29)\par 	Authored: Hospital Course, Med Reconciliation, Care Plan/Procedures, Follow Up, Quality Measures, Home Health, Document Complete\par \par \par Last Updated: 14-Jul-2022 14:29 by Jennifer Leslie)\par \par \par

## 2022-07-19 NOTE — PHYSICAL EXAM
[No Acute Distress] : no acute distress [Well Nourished] : well nourished [Well Developed] : well developed [Well-Appearing] : well-appearing [Normal Sclera/Conjunctiva] : normal sclera/conjunctiva [Normal Outer Ear/Nose] : the outer ears and nose were normal in appearance [Normal Oropharynx] : the oropharynx was normal [Supple] : supple [No Respiratory Distress] : no respiratory distress  [Clear to Auscultation] : lungs were clear to auscultation bilaterally [No Accessory Muscle Use] : no accessory muscle use [Pedal Pulses Present] : the pedal pulses are present [No Extremity Clubbing/Cyanosis] : no extremity clubbing/cyanosis [Soft] : abdomen soft [Non Tender] : non-tender [Non-distended] : non-distended [Normal Bowel Sounds] : normal bowel sounds [No Spinal Tenderness] : no spinal tenderness [Grossly Normal Strength/Tone] : grossly normal strength/tone [No Rash] : no rash [Coordination Grossly Intact] : coordination grossly intact [No Focal Deficits] : no focal deficits [Normal Affect] : the affect was normal [Alert and Oriented x3] : oriented to person, place, and time [Normal Insight/Judgement] : insight and judgment were intact [de-identified] : No thrush [de-identified] : afib irreg [de-identified] : trace R pedal edema [de-identified] : sactum/heels intact

## 2022-07-19 NOTE — PLAN
[FreeTextEntry1] : A/P:\par 1. S/P hospitalization for Acute Bronchitis:\par - s/p IV solumedrol, completed course po ceftin on DC\par - con't symbicort BID, albuterol/mucinex prn, prednisone taper\par - call for any SOB/Wheeze/decreased O2 sat\par - f/u Pullm on 7/22\par \par 2. Afib:\par - rate controlled on current meds\par - con't coreg, digoxin, coumadin\par - f/u cardiology \par \par 3. CAD:\par - No reports of CP\par - con't ASA, statin.\par - report any CP/SOB not relieved with rest\par - f/u cardiologyfor routine management\par \par 4. Hypercoagulable State:\par - + Factor V Leiden\par - con't coumadin for AC

## 2022-07-20 DIAGNOSIS — E78.5 HYPERLIPIDEMIA, UNSPECIFIED: ICD-10-CM

## 2022-07-20 DIAGNOSIS — I27.20 PULMONARY HYPERTENSION, UNSPECIFIED: ICD-10-CM

## 2022-07-20 DIAGNOSIS — D68.51 ACTIVATED PROTEIN C RESISTANCE: ICD-10-CM

## 2022-07-20 DIAGNOSIS — J20.9 ACUTE BRONCHITIS, UNSPECIFIED: ICD-10-CM

## 2022-07-20 DIAGNOSIS — I50.32 CHRONIC DIASTOLIC (CONGESTIVE) HEART FAILURE: ICD-10-CM

## 2022-07-20 DIAGNOSIS — J21.9 ACUTE BRONCHIOLITIS, UNSPECIFIED: ICD-10-CM

## 2022-07-20 DIAGNOSIS — I08.2 RHEUMATIC DISORDERS OF BOTH AORTIC AND TRICUSPID VALVES: ICD-10-CM

## 2022-07-20 DIAGNOSIS — I25.10 ATHEROSCLEROTIC HEART DISEASE OF NATIVE CORONARY ARTERY WITHOUT ANGINA PECTORIS: ICD-10-CM

## 2022-07-20 DIAGNOSIS — I48.20 CHRONIC ATRIAL FIBRILLATION, UNSPECIFIED: ICD-10-CM

## 2022-07-20 DIAGNOSIS — J96.01 ACUTE RESPIRATORY FAILURE WITH HYPOXIA: ICD-10-CM

## 2022-07-26 ENCOUNTER — LABORATORY RESULT (OUTPATIENT)
Age: 87
End: 2022-07-26

## 2022-07-27 LAB
INR PPP: 2.47
PROTHROMBIN TIME COMMENT: NORMAL
PT BLD: 28.9

## 2022-08-01 ENCOUNTER — RX RENEWAL (OUTPATIENT)
Age: 87
End: 2022-08-01

## 2022-08-04 ENCOUNTER — NON-APPOINTMENT (OUTPATIENT)
Age: 87
End: 2022-08-04

## 2022-08-09 ENCOUNTER — LABORATORY RESULT (OUTPATIENT)
Age: 87
End: 2022-08-09

## 2022-08-10 LAB — PROTHROMBIN TIME COMMENT: NORMAL

## 2022-08-30 ENCOUNTER — LABORATORY RESULT (OUTPATIENT)
Age: 87
End: 2022-08-30

## 2022-09-13 ENCOUNTER — LABORATORY RESULT (OUTPATIENT)
Age: 87
End: 2022-09-13

## 2022-09-22 ENCOUNTER — LABORATORY RESULT (OUTPATIENT)
Age: 87
End: 2022-09-22

## 2022-09-23 ENCOUNTER — APPOINTMENT (OUTPATIENT)
Dept: NEUROLOGY | Facility: CLINIC | Age: 87
End: 2022-09-23

## 2022-09-23 VITALS
WEIGHT: 211 LBS | BODY MASS INDEX: 27.96 KG/M2 | TEMPERATURE: 97.8 F | HEIGHT: 73 IN | HEART RATE: 97 BPM | SYSTOLIC BLOOD PRESSURE: 105 MMHG | DIASTOLIC BLOOD PRESSURE: 70 MMHG

## 2022-09-23 DIAGNOSIS — R29.3 ABNORMAL POSTURE: ICD-10-CM

## 2022-09-23 PROCEDURE — 99214 OFFICE O/P EST MOD 30 MIN: CPT

## 2022-09-23 RX ORDER — GUAIFENESIN 600 MG/1
600 TABLET ORAL
Refills: 0 | Status: DISCONTINUED | COMMUNITY
Start: 2022-07-18 | End: 2022-09-23

## 2022-09-23 RX ORDER — PREDNISONE 10 MG/1
10 TABLET ORAL DAILY
Refills: 0 | Status: DISCONTINUED | COMMUNITY
Start: 2022-07-14 | End: 2022-09-23

## 2022-09-23 NOTE — DATA REVIEWED
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

## 2022-09-23 NOTE — DISCUSSION/SUMMARY
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

## 2022-09-23 NOTE — HISTORY OF PRESENT ILLNESS
[FreeTextEntry1] : Patient is here for a follow-up visit today, well-known to Dr. Casey for years, last seen on 3/31/2022.  Is accompanied by his wife, he reports he continues to have unstable gait, he is using a tripod cane for getting around, he reports he is unable to play golf as he cannot swing.  He denies focal motor weakness, paresthesias of upper/lower extremities, he does admit that he continues to have discomfort in the right hip region.\par \par As per patient's wife, he sustained injury to right hip in December 2021, underwent hemiarthroplasty, spent several weeks in rehab, has attended physical therapy, she reports since this fall he continues to have unsteady gait and is not able to walk without assistance.  She has not noted any other change in his behavior, cognition -she admits he has slight age-related changes which are not significant, he had increased urinary frequency/occasional incontinence following hip surgery, it has resolved fully since past 4 months.  Does get up at night a couple of times to urinate but denies any urinary incontinence.  Patient denies any change in cognition or short-term memory issues, he is active, pays his bills.  Of note, patient had MRI of brain previously that was suggestive of communicating hydrocephalus, he has been monitored for NPH by Dr. Casey.\par \par Patient was admitted to [facility] in July, treated for bronchitis, he had CT brain on 7/10/2022 it showed no CT evidence of acute intracranial pathology, moderate generalized cerebral volume loss and mild periventricular white matter hypoattenuation compatible with microvascular ischemic change

## 2022-09-23 NOTE — REVIEW OF SYSTEMS
[Poor Coordination] : poor coordination [Loss Of Hearing] : hearing loss [Lower Ext Edema] : lower extremity edema [Negative] : Heme/Lymph [As Noted in HPI] : as noted in HPI [Memory Lapses or Loss] : no memory loss [Leg Weakness] : leg weakness [Difficulty Walking] : difficulty walking [Frequent Falls] : not falling [Incontinence] : no incontinence [Joint Pain] : joint pain [FreeTextEntry4] : [place] improved with new hearing aids

## 2022-09-23 NOTE — PHYSICAL EXAM
[General Appearance - Alert] : alert [General Appearance - In No Acute Distress] : in no acute distress [Oriented To Time, Place, And Person] : oriented to person, place, and time [Impaired Insight] : insight and judgment were intact [Affect] : the affect was normal [Person] : oriented to person [Place] : oriented to place [Time] : oriented to time [Concentration Intact] : normal concentrating ability [Naming Objects] : no difficulty naming common objects [Repeating Phrases] : no difficulty repeating a phrase [Fluency] : fluency intact [Comprehension] : comprehension intact [Past History] : adequate knowledge of personal past history [Cranial Nerves Optic (II)] : visual acuity intact bilaterally,  visual fields full to confrontation, pupils equal round and reactive to light [Cranial Nerves Oculomotor (III)] : extraocular motion intact [Cranial Nerves Trigeminal (V)] : facial sensation intact symmetrically [Cranial Nerves Facial (VII)] : face symmetrical [Cranial Nerves Vestibulocochlear (VIII)] : hearing was intact bilaterally [Cranial Nerves Glossopharyngeal (IX)] : tongue and palate midline [Cranial Nerves Accessory (XI - Cranial And Spinal)] : head turning and shoulder shrug symmetric [Cranial Nerves Hypoglossal (XII)] : there was no tongue deviation with protrusion [Motor Tone] : muscle tone was normal in all four extremities [Motor Strength] : muscle strength was normal in all four extremities [No Muscle Atrophy] : normal bulk in all four extremities [Sensation Tactile Decrease] : light touch was intact [1+] : Patella left 1+ [0] : Ankle jerk left 0 [PERRL With Normal Accommodation] : pupils were equal in size, round, reactive to light, with normal accommodation [Extraocular Movements] : extraocular movements were intact [Outer Ear] : the ears and nose were normal in appearance [Oropharynx] : the oropharynx was normal [Neck Cervical Mass (___cm)] : no neck mass was observed [Auscultation Breath Sounds / Voice Sounds] : lungs were clear to auscultation bilaterally [Heart Sounds] : normal S1 and S2 [Full Pulse] : the pedal pulses are present [Registration Intact] : recent registration memory intact [Full Visual Field] : full visual field [] : the neck was supple [Arterial Pulses Carotid] : carotid pulses were normal with no bruits [Edema] : there was no peripheral edema [Involuntary Movements] : no involuntary movements were seen [Romberg's Sign] : Romberg's sign was negtive [Past-pointing] : there was no past-pointing [Tremor] : no tremor present [Plantar Reflex Right Only] : normal on the right [Plantar Reflex Left Only] : normal on the left [___] : absent on the right [___] : absent on the left [FreeTextEntry7] : Distal vibration loss [FreeTextEntry8] : Gait/Balance: Mildly antalgic gait, favors left leg -uses a tripod cane [FreeTextEntry1] : Right leg edema

## 2022-10-06 ENCOUNTER — LABORATORY RESULT (OUTPATIENT)
Age: 87
End: 2022-10-06

## 2022-10-07 ENCOUNTER — LABORATORY RESULT (OUTPATIENT)
Age: 87
End: 2022-10-07

## 2022-10-09 ENCOUNTER — RX RENEWAL (OUTPATIENT)
Age: 87
End: 2022-10-09

## 2022-10-10 ENCOUNTER — LABORATORY RESULT (OUTPATIENT)
Age: 87
End: 2022-10-10

## 2022-10-20 ENCOUNTER — APPOINTMENT (OUTPATIENT)
Dept: CARDIOLOGY | Facility: CLINIC | Age: 87
End: 2022-10-20

## 2022-10-20 VITALS
SYSTOLIC BLOOD PRESSURE: 138 MMHG | HEART RATE: 83 BPM | DIASTOLIC BLOOD PRESSURE: 82 MMHG | WEIGHT: 219.4 LBS | OXYGEN SATURATION: 94 % | BODY MASS INDEX: 28.95 KG/M2

## 2022-10-20 LAB
INR PPP: 2.5 RATIO
POCT-PROTHROMBIN TIME: 30.5 SECS
QUALITY CONTROL: YES

## 2022-10-20 PROCEDURE — 85610 PROTHROMBIN TIME: CPT | Mod: QW

## 2022-10-20 PROCEDURE — 99214 OFFICE O/P EST MOD 30 MIN: CPT

## 2022-10-20 PROCEDURE — 93000 ELECTROCARDIOGRAM COMPLETE: CPT

## 2022-10-20 NOTE — HISTORY OF PRESENT ILLNESS
[FreeTextEntry1] : 89 Y/O Gentleman PMH:  DM, CAD, AF/recurrent LE DVT and Factor V Leiden on Coumadin, CM, HTN, Hyperlipidemia, ADRY treatment currently on hold, peripheral neuropathy, S/P  mechanical fall with RT hip fracture now S/P Right hemiarthroplasty 2/2 fracture post op course complicated with renal insufficiency/Anemia and low Iron levels followed with PCP,  additionally, developed drug rash 2/2  Pantoprazole \par Has completed rehabilitation and currently receiving Physical therapy as an opt\par \par \par Cardiac Cath 2/14/20 Single vessel CAD Moderate LAD disease \par Echo 3/17/21 NL LV SYS FX EF 60% Moderate LVH, Mild MR moderate AS/Mild AI, mild Pulmonary HTN, AO 3.8 CM, Ascending 4.1 CM\par Carotid US 2018 B/L carotid plaque < 50% stenosis \par Pt will continue to increase activity as tolerated. \par \par

## 2022-10-20 NOTE — DISCUSSION/SUMMARY
[FreeTextEntry1] : S/P  mechanical fall/ RT hip fracture S/P Right hemiarthroplasty; post op course complicated with renal insufficiency/Anemia elevated creat and low Iron levels (\par \par \par HTN: Adequately controlled on current medications will remain off Enalapril for now and continue with daily Pt was encouraged to maintain blood pressure log\par CAD: Single vessel CAD moderate LAD disease ( Mild RCA disease )  NL LV FX continue baby ASA/statin \par \par AO 3.8 CM, Ascending 4.1 CM: Monitor periodically US Aorta ordered \par \par Valvular Heart disease:Echo reviewed\par \par AF: INR subtherapeutic today. \par DM: Followed with Endocrinologist  \par \par Hyperlipidemia: Continue statin therapy.  LDL goal of 70 \par \par ECG done to evaluate arrhythmia. Pt will follow up in 6 months.  [EKG obtained to assist in diagnosis and management of assessed problem(s)] : EKG obtained to assist in diagnosis and management of assessed problem(s)

## 2022-11-03 ENCOUNTER — LABORATORY RESULT (OUTPATIENT)
Age: 87
End: 2022-11-03

## 2022-11-18 ENCOUNTER — LABORATORY RESULT (OUTPATIENT)
Age: 87
End: 2022-11-18

## 2022-11-22 ENCOUNTER — NON-APPOINTMENT (OUTPATIENT)
Age: 87
End: 2022-11-22

## 2022-11-22 LAB
INR PPP: 2.49
PROTHROMBIN TIME COMMENT: NORMAL
PT BLD: 29.7

## 2022-12-01 ENCOUNTER — LABORATORY RESULT (OUTPATIENT)
Age: 87
End: 2022-12-01

## 2022-12-22 ENCOUNTER — LABORATORY RESULT (OUTPATIENT)
Age: 87
End: 2022-12-22

## 2023-01-12 ENCOUNTER — LABORATORY RESULT (OUTPATIENT)
Age: 88
End: 2023-01-12

## 2023-02-02 ENCOUNTER — LABORATORY RESULT (OUTPATIENT)
Age: 88
End: 2023-02-02

## 2023-02-06 ENCOUNTER — RX RENEWAL (OUTPATIENT)
Age: 88
End: 2023-02-06

## 2023-02-23 ENCOUNTER — LABORATORY RESULT (OUTPATIENT)
Age: 88
End: 2023-02-23

## 2023-02-24 ENCOUNTER — APPOINTMENT (OUTPATIENT)
Dept: CARDIOLOGY | Facility: CLINIC | Age: 88
End: 2023-02-24
Payer: MEDICARE

## 2023-02-24 ENCOUNTER — NON-APPOINTMENT (OUTPATIENT)
Age: 88
End: 2023-02-24

## 2023-02-24 VITALS
HEIGHT: 73 IN | DIASTOLIC BLOOD PRESSURE: 80 MMHG | WEIGHT: 220 LBS | OXYGEN SATURATION: 97 % | BODY MASS INDEX: 29.16 KG/M2 | HEART RATE: 100 BPM | SYSTOLIC BLOOD PRESSURE: 136 MMHG

## 2023-02-24 DIAGNOSIS — R90.89 OTHER ABNORMAL FINDINGS ON DIAGNOSTIC IMAGING OF CENTRAL NERVOUS SYSTEM: ICD-10-CM

## 2023-02-24 LAB
INR PPP: 2.2 RATIO
POCT-PROTHROMBIN TIME: 26.5 SECS
QUALITY CONTROL: YES

## 2023-02-24 PROCEDURE — 85610 PROTHROMBIN TIME: CPT | Mod: QW

## 2023-02-24 PROCEDURE — 93000 ELECTROCARDIOGRAM COMPLETE: CPT

## 2023-02-24 PROCEDURE — 99214 OFFICE O/P EST MOD 30 MIN: CPT

## 2023-02-24 NOTE — HISTORY OF PRESENT ILLNESS
[FreeTextEntry1] : 87 Y/O Gent[place] PMH:  DM, CAD, AF/recurrent LE DVT and Factor V Leiden on Coumadin, CM, HTN, Hyperlipidemia, ADRY treatment currently on hold, peripheral neuropathy, S/P  mechanical fall with RT hip fracture now S/P Right hemiarthroplasty 2/2 fracture post op course complicated with renal insufficiency/Anemia and low Iron levels followed with PCP,  additionally, developed drug rash 2/2  Pantoprazole \par \par \par Cardiac Cath 2/14/20 Single vessel CAD Moderate LAD disease \par Echo 3/17/21 NL LV SYS FX EF 60% Moderate LVH, Mild MR moderate AS/Mild AI, mild Pulmonary HTN, AO 3.8 CM, Ascending 4.1 CM\par Carotid US 2018 B/L carotid plaque < 50% stenosis \par Pt denies exertional symptoms. Pt reports leg heaviness after 1/2 mi which stops his walking. \par

## 2023-02-24 NOTE — DISCUSSION/SUMMARY
[FreeTextEntry1] : S/P  mechanical fall/ RT hip fracture S/P Right hemiarthroplasty; \par \par \par \par HTN: Adequately controlled on current medications  continue with daily Pt was encouraged to maintain blood pressure log\par CAD: Single vessel CAD moderate LAD disease ( Mild RCA disease )  NL LV FX continue baby ASA/statin \par \par AO 3.8 CM, Ascending 4.1 CM: Monitor periodically US Aorta ordered \par \par Valvular Heart disease:Echo reviewed\par \par AF: INR today\par DM: Followed with Endocrinologist  \par \par Hyperlipidemia: Continue statin therapy.  LDL goal of 70 \par \par ECG done to evaluate arrhythmia. Pt will follow up in 6 months.  [EKG obtained to assist in diagnosis and management of assessed problem(s)] : EKG obtained to assist in diagnosis and management of assessed problem(s)

## 2023-03-03 ENCOUNTER — APPOINTMENT (OUTPATIENT)
Dept: CARDIOLOGY | Facility: CLINIC | Age: 88
End: 2023-03-03
Payer: MEDICARE

## 2023-03-03 PROCEDURE — 93978 VASCULAR STUDY: CPT

## 2023-03-03 PROCEDURE — 93306 TTE W/DOPPLER COMPLETE: CPT

## 2023-03-16 ENCOUNTER — NON-APPOINTMENT (OUTPATIENT)
Age: 88
End: 2023-03-16

## 2023-03-23 ENCOUNTER — LABORATORY RESULT (OUTPATIENT)
Age: 88
End: 2023-03-23

## 2023-04-11 ENCOUNTER — APPOINTMENT (OUTPATIENT)
Dept: NEUROLOGY | Facility: CLINIC | Age: 88
End: 2023-04-11
Payer: MEDICARE

## 2023-04-11 VITALS
HEART RATE: 73 BPM | WEIGHT: 215 LBS | TEMPERATURE: 97.6 F | SYSTOLIC BLOOD PRESSURE: 114 MMHG | HEIGHT: 73 IN | DIASTOLIC BLOOD PRESSURE: 79 MMHG | BODY MASS INDEX: 28.49 KG/M2

## 2023-04-11 DIAGNOSIS — R26.81 UNSTEADINESS ON FEET: ICD-10-CM

## 2023-04-11 PROCEDURE — 99214 OFFICE O/P EST MOD 30 MIN: CPT

## 2023-04-11 NOTE — HISTORY OF PRESENT ILLNESS
[FreeTextEntry1] : DistalPatient is here for a follow-up visit today,  last seen on 9/23/22, Is accompanied by his wife. Pt reports he continues to have unstable gait, he is using a tripod cane for getting around, has been walking daily this is, he reports he is unable to walk longer stretches as he feels tired. He denies focal motor weakness, has no difficulty lifting his legs, denies paresthesias of upper/lower extremities, he does admit that he continues to have discomfort in the right hip region.  He has not had any falls, he has been attending physical therapy that is very helpful, he has also followed up with his orthopedist regarding right hip issues.\par \par Patient has not noted any decline in his cognition or change in his behavior, he has increased urinary frequency, get up at night a couple of times to urinate but denies any urinary incontinence.  \par \par Patient does bring to my attention that while walking in the dark at night he feels off balance.\par \par He has been off CPAP

## 2023-04-11 NOTE — REVIEW OF SYSTEMS
[As Noted in HPI] : as noted in HPI [Leg Weakness] : leg weakness [Poor Coordination] : poor coordination [Difficulty Walking] : difficulty walking [Loss Of Hearing] : hearing loss [Lower Ext Edema] : lower extremity edema [Joint Pain] : joint pain [Negative] : Heme/Lymph [Memory Lapses or Loss] : no memory loss [Frequent Falls] : not falling [Incontinence] : no incontinence [FreeTextEntry4] : [place] improved with new hearing aids

## 2023-04-11 NOTE — PHYSICAL EXAM
[General Appearance - Alert] : alert [General Appearance - In No Acute Distress] : in no acute distress [Oriented To Time, Place, And Person] : oriented to person, place, and time [Impaired Insight] : insight and judgment were intact [Affect] : the affect was normal [Person] : oriented to person [Place] : oriented to place [Time] : oriented to time [Registration Intact] : recent registration memory intact [Concentration Intact] : normal concentrating ability [Naming Objects] : no difficulty naming common objects [Repeating Phrases] : no difficulty repeating a phrase [Fluency] : fluency intact [Comprehension] : comprehension intact [Past History] : adequate knowledge of personal past history [Cranial Nerves Optic (II)] : visual acuity intact bilaterally,  visual fields full to confrontation, pupils equal round and reactive to light [Cranial Nerves Oculomotor (III)] : extraocular motion intact [Cranial Nerves Trigeminal (V)] : facial sensation intact symmetrically [Cranial Nerves Facial (VII)] : face symmetrical [Cranial Nerves Vestibulocochlear (VIII)] : hearing was intact bilaterally [Cranial Nerves Glossopharyngeal (IX)] : tongue and palate midline [Cranial Nerves Accessory (XI - Cranial And Spinal)] : head turning and shoulder shrug symmetric [Cranial Nerves Hypoglossal (XII)] : there was no tongue deviation with protrusion [Motor Tone] : muscle tone was normal in all four extremities [Motor Strength] : muscle strength was normal in all four extremities [No Muscle Atrophy] : normal bulk in all four extremities [Sensation Tactile Decrease] : light touch was intact [1+] : Patella left 1+ [0] : Ankle jerk left 0 [PERRL With Normal Accommodation] : pupils were equal in size, round, reactive to light, with normal accommodation [Extraocular Movements] : extraocular movements were intact [Full Visual Field] : full visual field [Outer Ear] : the ears and nose were normal in appearance [Oropharynx] : the oropharynx was normal [Neck Cervical Mass (___cm)] : no neck mass was observed [Auscultation Breath Sounds / Voice Sounds] : lungs were clear to auscultation bilaterally [Heart Sounds] : normal S1 and S2 [Arterial Pulses Carotid] : carotid pulses were normal with no bruits [Full Pulse] : the pedal pulses are present [Edema] : there was no peripheral edema [Involuntary Movements] : no involuntary movements were seen [] : no rash [Romberg's Sign] : Romberg's sign was negtive [Past-pointing] : there was no past-pointing [Tremor] : no tremor present [Plantar Reflex Right Only] : normal on the right [Plantar Reflex Left Only] : normal on the left [___] : absent on the right [___] : absent on the left [FreeTextEntry7] : Distal vibration loss [FreeTextEntry8] : Gait/Balance: Mildly antalgic gait, favors left leg -uses a tripod cane [FreeTextEntry1] : Right leg edema

## 2023-04-11 NOTE — DISCUSSION/SUMMARY
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

## 2023-04-11 NOTE — DATA REVIEWED
[de-identified] : 4/27/2018: Stable exam demonstrating communicating hydrocephalus and small arachnoid cyst with microvascular change [de-identified] : CT brain 7/10/2022: no CT evidence of acute intracranial pathology, moderate generalized cerebral volume loss and mild periventricular white matter hypoattenuation compatible with microvascular ischemic change\par

## 2023-04-15 ENCOUNTER — INPATIENT (INPATIENT)
Facility: HOSPITAL | Age: 88
LOS: 2 days | Discharge: HOME | DRG: 149 | End: 2023-04-18
Attending: STUDENT IN AN ORGANIZED HEALTH CARE EDUCATION/TRAINING PROGRAM | Admitting: FAMILY MEDICINE
Payer: MEDICARE

## 2023-04-15 VITALS
HEIGHT: 74 IN | DIASTOLIC BLOOD PRESSURE: 62 MMHG | HEART RATE: 70 BPM | WEIGHT: 214.95 LBS | RESPIRATION RATE: 18 BRPM | OXYGEN SATURATION: 95 % | TEMPERATURE: 98 F | SYSTOLIC BLOOD PRESSURE: 116 MMHG

## 2023-04-15 DIAGNOSIS — R26.81 UNSTEADINESS ON FEET: ICD-10-CM

## 2023-04-15 DIAGNOSIS — Z90.49 ACQUIRED ABSENCE OF OTHER SPECIFIED PARTS OF DIGESTIVE TRACT: Chronic | ICD-10-CM

## 2023-04-15 LAB
ALBUMIN SERPL ELPH-MCNC: 4 G/DL — SIGNIFICANT CHANGE UP (ref 3.3–5)
ALP SERPL-CCNC: 50 U/L — SIGNIFICANT CHANGE UP (ref 40–120)
ALT FLD-CCNC: 44 U/L — SIGNIFICANT CHANGE UP (ref 12–78)
ANION GAP SERPL CALC-SCNC: 2 MMOL/L — LOW (ref 5–17)
APPEARANCE UR: CLEAR — SIGNIFICANT CHANGE UP
APTT BLD: 35.2 SEC — SIGNIFICANT CHANGE UP (ref 27.5–35.5)
AST SERPL-CCNC: 29 U/L — SIGNIFICANT CHANGE UP (ref 15–37)
BACTERIA # UR AUTO: ABNORMAL
BASOPHILS # BLD AUTO: 0.02 K/UL — SIGNIFICANT CHANGE UP (ref 0–0.2)
BASOPHILS NFR BLD AUTO: 0.3 % — SIGNIFICANT CHANGE UP (ref 0–2)
BILIRUB SERPL-MCNC: 0.9 MG/DL — SIGNIFICANT CHANGE UP (ref 0.2–1.2)
BILIRUB UR-MCNC: NEGATIVE — SIGNIFICANT CHANGE UP
BLD GP AB SCN SERPL QL: SIGNIFICANT CHANGE UP
BUN SERPL-MCNC: 25 MG/DL — HIGH (ref 7–23)
CALCIUM SERPL-MCNC: 9.8 MG/DL — SIGNIFICANT CHANGE UP (ref 8.5–10.1)
CHLORIDE SERPL-SCNC: 108 MMOL/L — SIGNIFICANT CHANGE UP (ref 96–108)
CO2 SERPL-SCNC: 26 MMOL/L — SIGNIFICANT CHANGE UP (ref 22–31)
COLOR SPEC: YELLOW — SIGNIFICANT CHANGE UP
COMMENT - URINE: SIGNIFICANT CHANGE UP
CREAT SERPL-MCNC: 1.25 MG/DL — SIGNIFICANT CHANGE UP (ref 0.5–1.3)
DIFF PNL FLD: ABNORMAL
EGFR: 55 ML/MIN/1.73M2 — LOW
EOSINOPHIL # BLD AUTO: 0.11 K/UL — SIGNIFICANT CHANGE UP (ref 0–0.5)
EOSINOPHIL NFR BLD AUTO: 1.5 % — SIGNIFICANT CHANGE UP (ref 0–6)
EPI CELLS # UR: SIGNIFICANT CHANGE UP
FLUAV AG NPH QL: SIGNIFICANT CHANGE UP
FLUBV AG NPH QL: SIGNIFICANT CHANGE UP
GLUCOSE SERPL-MCNC: 123 MG/DL — HIGH (ref 70–99)
GLUCOSE UR QL: NEGATIVE — SIGNIFICANT CHANGE UP
HCT VFR BLD CALC: 45.3 % — SIGNIFICANT CHANGE UP (ref 39–50)
HGB BLD-MCNC: 14.6 G/DL — SIGNIFICANT CHANGE UP (ref 13–17)
IMM GRANULOCYTES NFR BLD AUTO: 0.3 % — SIGNIFICANT CHANGE UP (ref 0–0.9)
INR BLD: 1.74 RATIO — HIGH (ref 0.88–1.16)
KETONES UR-MCNC: ABNORMAL
LEUKOCYTE ESTERASE UR-ACNC: ABNORMAL
LYMPHOCYTES # BLD AUTO: 1.29 K/UL — SIGNIFICANT CHANGE UP (ref 1–3.3)
LYMPHOCYTES # BLD AUTO: 17.4 % — SIGNIFICANT CHANGE UP (ref 13–44)
MAGNESIUM SERPL-MCNC: 2.7 MG/DL — HIGH (ref 1.6–2.6)
MCHC RBC-ENTMCNC: 32.2 GM/DL — SIGNIFICANT CHANGE UP (ref 32–36)
MCHC RBC-ENTMCNC: 32.7 PG — SIGNIFICANT CHANGE UP (ref 27–34)
MCV RBC AUTO: 101.3 FL — HIGH (ref 80–100)
MONOCYTES # BLD AUTO: 0.62 K/UL — SIGNIFICANT CHANGE UP (ref 0–0.9)
MONOCYTES NFR BLD AUTO: 8.4 % — SIGNIFICANT CHANGE UP (ref 2–14)
NEUTROPHILS # BLD AUTO: 5.35 K/UL — SIGNIFICANT CHANGE UP (ref 1.8–7.4)
NEUTROPHILS NFR BLD AUTO: 72.1 % — SIGNIFICANT CHANGE UP (ref 43–77)
NITRITE UR-MCNC: NEGATIVE — SIGNIFICANT CHANGE UP
PH UR: 5 — SIGNIFICANT CHANGE UP (ref 5–8)
PLATELET # BLD AUTO: 152 K/UL — SIGNIFICANT CHANGE UP (ref 150–400)
POTASSIUM SERPL-MCNC: 5.4 MMOL/L — HIGH (ref 3.5–5.3)
POTASSIUM SERPL-SCNC: 5.4 MMOL/L — HIGH (ref 3.5–5.3)
PROT SERPL-MCNC: 7.9 GM/DL — SIGNIFICANT CHANGE UP (ref 6–8.3)
PROT UR-MCNC: 30 MG/DL
PROTHROM AB SERPL-ACNC: 20.3 SEC — HIGH (ref 10.5–13.4)
RBC # BLD: 4.47 M/UL — SIGNIFICANT CHANGE UP (ref 4.2–5.8)
RBC # FLD: 13.6 % — SIGNIFICANT CHANGE UP (ref 10.3–14.5)
RBC CASTS # UR COMP ASSIST: ABNORMAL /HPF (ref 0–4)
RSV RNA NPH QL NAA+NON-PROBE: SIGNIFICANT CHANGE UP
SARS-COV-2 RNA SPEC QL NAA+PROBE: SIGNIFICANT CHANGE UP
SODIUM SERPL-SCNC: 136 MMOL/L — SIGNIFICANT CHANGE UP (ref 135–145)
SP GR SPEC: 1.02 — SIGNIFICANT CHANGE UP (ref 1.01–1.02)
TROPONIN I, HIGH SENSITIVITY RESULT: 12.96 NG/L — SIGNIFICANT CHANGE UP
TROPONIN I, HIGH SENSITIVITY RESULT: 13.32 NG/L — SIGNIFICANT CHANGE UP
UROBILINOGEN FLD QL: NEGATIVE — SIGNIFICANT CHANGE UP
WBC # BLD: 7.41 K/UL — SIGNIFICANT CHANGE UP (ref 3.8–10.5)
WBC # FLD AUTO: 7.41 K/UL — SIGNIFICANT CHANGE UP (ref 3.8–10.5)
WBC UR QL: SIGNIFICANT CHANGE UP /HPF (ref 0–5)

## 2023-04-15 PROCEDURE — 93010 ELECTROCARDIOGRAM REPORT: CPT

## 2023-04-15 PROCEDURE — 84484 ASSAY OF TROPONIN QUANT: CPT

## 2023-04-15 PROCEDURE — 36415 COLL VENOUS BLD VENIPUNCTURE: CPT

## 2023-04-15 PROCEDURE — 70544 MR ANGIOGRAPHY HEAD W/O DYE: CPT

## 2023-04-15 PROCEDURE — 83036 HEMOGLOBIN GLYCOSYLATED A1C: CPT

## 2023-04-15 PROCEDURE — 97162 PT EVAL MOD COMPLEX 30 MIN: CPT | Mod: GP

## 2023-04-15 PROCEDURE — 97116 GAIT TRAINING THERAPY: CPT | Mod: GP

## 2023-04-15 PROCEDURE — 97530 THERAPEUTIC ACTIVITIES: CPT | Mod: GP

## 2023-04-15 PROCEDURE — 82607 VITAMIN B-12: CPT

## 2023-04-15 PROCEDURE — 71045 X-RAY EXAM CHEST 1 VIEW: CPT | Mod: 26

## 2023-04-15 PROCEDURE — 70551 MRI BRAIN STEM W/O DYE: CPT

## 2023-04-15 PROCEDURE — 80048 BASIC METABOLIC PNL TOTAL CA: CPT

## 2023-04-15 PROCEDURE — 80061 LIPID PANEL: CPT

## 2023-04-15 PROCEDURE — 70450 CT HEAD/BRAIN W/O DYE: CPT | Mod: 26,MA

## 2023-04-15 PROCEDURE — 82962 GLUCOSE BLOOD TEST: CPT

## 2023-04-15 PROCEDURE — 92610 EVALUATE SWALLOWING FUNCTION: CPT | Mod: GN

## 2023-04-15 PROCEDURE — 85027 COMPLETE CBC AUTOMATED: CPT

## 2023-04-15 PROCEDURE — 99285 EMERGENCY DEPT VISIT HI MDM: CPT

## 2023-04-15 PROCEDURE — 85730 THROMBOPLASTIN TIME PARTIAL: CPT

## 2023-04-15 PROCEDURE — 93306 TTE W/DOPPLER COMPLETE: CPT

## 2023-04-15 PROCEDURE — 94640 AIRWAY INHALATION TREATMENT: CPT

## 2023-04-15 PROCEDURE — 92523 SPEECH SOUND LANG COMPREHEN: CPT | Mod: GN

## 2023-04-15 PROCEDURE — 85610 PROTHROMBIN TIME: CPT

## 2023-04-15 PROCEDURE — 80162 ASSAY OF DIGOXIN TOTAL: CPT

## 2023-04-15 RX ORDER — WARFARIN SODIUM 2.5 MG/1
1 TABLET ORAL
Qty: 0 | Refills: 0 | DISCHARGE

## 2023-04-15 RX ORDER — SODIUM CHLORIDE 9 MG/ML
1000 INJECTION INTRAMUSCULAR; INTRAVENOUS; SUBCUTANEOUS ONCE
Refills: 0 | Status: COMPLETED | OUTPATIENT
Start: 2023-04-15 | End: 2023-04-15

## 2023-04-15 RX ADMIN — SODIUM CHLORIDE 1000 MILLILITER(S): 9 INJECTION INTRAMUSCULAR; INTRAVENOUS; SUBCUTANEOUS at 16:58

## 2023-04-15 RX ADMIN — SODIUM CHLORIDE 1000 MILLILITER(S): 9 INJECTION INTRAMUSCULAR; INTRAVENOUS; SUBCUTANEOUS at 17:58

## 2023-04-15 NOTE — ED PROVIDER NOTE - OBJECTIVE STATEMENT
87 y/o male w/ a PMHx of CAD and Afib on Warfarin, HLD, DVT presents to the ED sent in by Dr. Calix for Afib w/ 'long pauses'. pt reports he has been endorsing lightheadedness and worsening unsteady gait since 04/12 AM. states that he had a fracture in leg on 12/2021 so he has been on rehab and uses cane at baseline. even with cane, still endorses a very unsteady gait. also c/o generalized weakness and dizziness. as per wife, pt can tolerate PO intake and denies fever. PCP took his EKG and found abnormal reading despite Afib. addition to sudden change in gait, pt was sent to ED for further evaluation and to be monitored. gait is currently unsteady. denies trauma, injury, or fall. no difficulty in speech, weakness in UE and LE, v/d. pt lives at home with wife. 87 y/o male w/ a PMHx of CAD and Afib on Warfarin, HLD, DVT presents to the ED as sent by PMD for evaluation; pt reports he has been endorsing lightheadedness and worsening unsteady gait since 04/12 AM. states that he had a fracture in leg on 12/2021 so he has been on rehab and uses cane at baseline. even with cane, still endorses a very unsteady gait. also c/o generalized weakness and dizziness. as per wife, pt can tolerate PO intake and denies fever. denies trauma, injury, or fall. no difficulty in speech, weakness in UE and LE, v/d. pt lives at home with wife.

## 2023-04-15 NOTE — ED ADULT NURSE REASSESSMENT NOTE - NS ED NURSE REASSESS COMMENT FT1
Assumed care of patient; received in shift handoff from JORDON Locke   Patient sitting up in bed; denies any complaints. Meal tray that was previously ordered was provided. Patient on cardiac monitor with A-fib. Denies any dizziness or palpitations. Pending bed assignment.

## 2023-04-15 NOTE — ED ADULT TRIAGE NOTE - CHIEF COMPLAINT QUOTE
Tomer Lemos, was at PCP MD Calix and advised to come to ED. States EKG showed A-Fib with long pauses per patient's wife. Patient states generalized weakness, feeling as though he might pass out. Radames any CP/SOB. Taken for EKG. Tomer Lemos, was at PCP MD Calix and advised to come to ED. States EKG showed A-Fib with long pauses per patient's wife. Patient states generalized weakness, feeling as though he might pass out. Radames any CP/SOB. Breathing even and nonlabored, appears in no distress. Taken for EKG.

## 2023-04-15 NOTE — PATIENT PROFILE ADULT - FUNCTIONAL ASSESSMENT - BASIC MOBILITY 6.
3-calculated by average/Not able to assess (calculate score using [facility] averaging method)

## 2023-04-15 NOTE — ED ADULT NURSE NOTE - NSIMPLEMENTINTERV_GEN_ALL_ED
Implemented All Universal Safety Interventions:  [place] to call system. Call bell, personal items and telephone within reach. Instruct patient to call for assistance. Room bathroom lighting operational. Non-slip footwear when patient is off stretcher. Physically safe environment: no spills, clutter or unnecessary equipment. Stretcher in lowest position, wheels locked, appropriate side rails in place.

## 2023-04-15 NOTE — ED ADULT NURSE NOTE - CHIEF COMPLAINT QUOTE
Tomer Lemos, was at PCP MD Calix and advised to come to ED. States EKG showed A-Fib with long pauses per patient's wife. Patient states generalized weakness, feeling as though he might pass out. Radames any CP/SOB. Breathing even and nonlabored, appears in no distress. Taken for EKG.

## 2023-04-15 NOTE — ED STATDOCS - PROGRESS NOTE DETAILS
Mari Vences for attending Dr. Olivia:  89 y/o male w/ a PMHx of CAD and Afib on Warfarin presents to the ED sent in by Dr. Calix for Afib w/ 'long pauses'. Pt reports he has been very unsteady walking x2 days w/ generalized weakness and dizziness, states he occasionally feels like he is going to pass out. Pt w/ unsteady gait, unable to ambulate on his own which is new from baseline. Will send pt to main ED for further evaluation.

## 2023-04-15 NOTE — PATIENT PROFILE ADULT - NSPROMEDSBROUGHTTOHOSP_GEN_A_NUR
1808 Pt admitted an pending possible OR today or Tomorrow Tylenol given for the fever and pain Soraya no

## 2023-04-15 NOTE — ED PROVIDER NOTE - CLINICAL SUMMARY MEDICAL DECISION MAKING FREE TEXT BOX
89 yo male with unsteady gait; ?central etiology; non focal neuro exam; CT head imaging; basic labs; screening ekg and cxr; labs; urine; re-eval    6:14pm: in setting of unsteady gait; CT head neg; admit for further work up; t/c MRI brain; IVF ordered; endorsed to Dr. Vickers at this time; results d/w family at bedside.

## 2023-04-15 NOTE — ED STATDOCS - WET READ LAUNCH FT
Medication(s) requested: Norco  Last office visit: 6/23/22  Next office visit: 10/26/22  Last refill given: 7/27/22  Last refill picked up from pharmacy: 7/27/22  Contract present: yes  Date of contract: 7/31/18  Last urine drug screen: 11/18/22    Is the patient due for refill of this medication(s): Yes  PDMP review: Criteria met. Forwarded to Physician/MARCO for signature.        
There are no Wet Read(s) to document.

## 2023-04-16 DIAGNOSIS — I25.10 ATHEROSCLEROTIC HEART DISEASE OF NATIVE CORONARY ARTERY WITHOUT ANGINA PECTORIS: ICD-10-CM

## 2023-04-16 DIAGNOSIS — I48.20 CHRONIC ATRIAL FIBRILLATION, UNSPECIFIED: ICD-10-CM

## 2023-04-16 DIAGNOSIS — I38 ENDOCARDITIS, VALVE UNSPECIFIED: ICD-10-CM

## 2023-04-16 DIAGNOSIS — R42 DIZZINESS AND GIDDINESS: ICD-10-CM

## 2023-04-16 LAB
A1C WITH ESTIMATED AVERAGE GLUCOSE RESULT: 7.1 % — HIGH (ref 4–5.6)
ADD ON TEST-SPECIMEN IN LAB: SIGNIFICANT CHANGE UP
ANION GAP SERPL CALC-SCNC: 3 MMOL/L — LOW (ref 5–17)
APTT BLD: 36.8 SEC — HIGH (ref 27.5–35.5)
BUN SERPL-MCNC: 25 MG/DL — HIGH (ref 7–23)
CALCIUM SERPL-MCNC: 9 MG/DL — SIGNIFICANT CHANGE UP (ref 8.5–10.1)
CHLORIDE SERPL-SCNC: 110 MMOL/L — HIGH (ref 96–108)
CHOLEST SERPL-MCNC: 110 MG/DL — SIGNIFICANT CHANGE UP
CO2 SERPL-SCNC: 26 MMOL/L — SIGNIFICANT CHANGE UP (ref 22–31)
CREAT SERPL-MCNC: 1.03 MG/DL — SIGNIFICANT CHANGE UP (ref 0.5–1.3)
CULTURE RESULTS: SIGNIFICANT CHANGE UP
DIGOXIN SERPL-MCNC: 0.51 NG/ML — LOW (ref 0.8–2)
EGFR: 70 ML/MIN/1.73M2 — SIGNIFICANT CHANGE UP
ESTIMATED AVERAGE GLUCOSE: 157 MG/DL — HIGH (ref 68–114)
GLUCOSE BLDC GLUCOMTR-MCNC: 132 MG/DL — HIGH (ref 70–99)
GLUCOSE BLDC GLUCOMTR-MCNC: 135 MG/DL — HIGH (ref 70–99)
GLUCOSE SERPL-MCNC: 141 MG/DL — HIGH (ref 70–99)
HCT VFR BLD CALC: 44.3 % — SIGNIFICANT CHANGE UP (ref 39–50)
HDLC SERPL-MCNC: 48 MG/DL — SIGNIFICANT CHANGE UP
HGB BLD-MCNC: 14 G/DL — SIGNIFICANT CHANGE UP (ref 13–17)
INR BLD: 1.87 RATIO — HIGH (ref 0.88–1.16)
LIPID PNL WITH DIRECT LDL SERPL: 34 MG/DL — SIGNIFICANT CHANGE UP
MCHC RBC-ENTMCNC: 31.6 GM/DL — LOW (ref 32–36)
MCHC RBC-ENTMCNC: 32.3 PG — SIGNIFICANT CHANGE UP (ref 27–34)
MCV RBC AUTO: 102.1 FL — HIGH (ref 80–100)
NON HDL CHOLESTEROL: 62 MG/DL — SIGNIFICANT CHANGE UP
PLATELET # BLD AUTO: 142 K/UL — LOW (ref 150–400)
POTASSIUM SERPL-MCNC: 4.6 MMOL/L — SIGNIFICANT CHANGE UP (ref 3.5–5.3)
POTASSIUM SERPL-SCNC: 4.6 MMOL/L — SIGNIFICANT CHANGE UP (ref 3.5–5.3)
PROTHROM AB SERPL-ACNC: 21.8 SEC — HIGH (ref 10.5–13.4)
RBC # BLD: 4.34 M/UL — SIGNIFICANT CHANGE UP (ref 4.2–5.8)
RBC # FLD: 13.5 % — SIGNIFICANT CHANGE UP (ref 10.3–14.5)
SODIUM SERPL-SCNC: 139 MMOL/L — SIGNIFICANT CHANGE UP (ref 135–145)
SPECIMEN SOURCE: SIGNIFICANT CHANGE UP
TRIGL SERPL-MCNC: 140 MG/DL — SIGNIFICANT CHANGE UP
TROPONIN I, HIGH SENSITIVITY RESULT: 12.74 NG/L — SIGNIFICANT CHANGE UP
WBC # BLD: 7.56 K/UL — SIGNIFICANT CHANGE UP (ref 3.8–10.5)
WBC # FLD AUTO: 7.56 K/UL — SIGNIFICANT CHANGE UP (ref 3.8–10.5)

## 2023-04-16 PROCEDURE — 12345: CPT | Mod: NC

## 2023-04-16 PROCEDURE — 99233 SBSQ HOSP IP/OBS HIGH 50: CPT

## 2023-04-16 PROCEDURE — 99223 1ST HOSP IP/OBS HIGH 75: CPT

## 2023-04-16 PROCEDURE — 99222 1ST HOSP IP/OBS MODERATE 55: CPT

## 2023-04-16 RX ORDER — GLUCAGON INJECTION, SOLUTION 0.5 MG/.1ML
1 INJECTION, SOLUTION SUBCUTANEOUS ONCE
Refills: 0 | Status: DISCONTINUED | OUTPATIENT
Start: 2023-04-16 | End: 2023-04-18

## 2023-04-16 RX ORDER — WARFARIN SODIUM 2.5 MG/1
1 TABLET ORAL ONCE
Refills: 0 | Status: COMPLETED | OUTPATIENT
Start: 2023-04-16 | End: 2023-04-16

## 2023-04-16 RX ORDER — DEXTROSE 50 % IN WATER 50 %
12.5 SYRINGE (ML) INTRAVENOUS ONCE
Refills: 0 | Status: DISCONTINUED | OUTPATIENT
Start: 2023-04-16 | End: 2023-04-18

## 2023-04-16 RX ORDER — CARVEDILOL PHOSPHATE 80 MG/1
12.5 CAPSULE, EXTENDED RELEASE ORAL EVERY 12 HOURS
Refills: 0 | Status: DISCONTINUED | OUTPATIENT
Start: 2023-04-16 | End: 2023-04-18

## 2023-04-16 RX ORDER — DEXTROSE 50 % IN WATER 50 %
15 SYRINGE (ML) INTRAVENOUS ONCE
Refills: 0 | Status: DISCONTINUED | OUTPATIENT
Start: 2023-04-16 | End: 2023-04-18

## 2023-04-16 RX ORDER — INSULIN LISPRO 100/ML
VIAL (ML) SUBCUTANEOUS
Refills: 0 | Status: DISCONTINUED | OUTPATIENT
Start: 2023-04-16 | End: 2023-04-18

## 2023-04-16 RX ORDER — DEXTROSE 50 % IN WATER 50 %
25 SYRINGE (ML) INTRAVENOUS ONCE
Refills: 0 | Status: DISCONTINUED | OUTPATIENT
Start: 2023-04-16 | End: 2023-04-18

## 2023-04-16 RX ORDER — BUDESONIDE, MICRONIZED 100 %
0.5 POWDER (GRAM) MISCELLANEOUS DAILY
Refills: 0 | Status: DISCONTINUED | OUTPATIENT
Start: 2023-04-16 | End: 2023-04-18

## 2023-04-16 RX ORDER — ATORVASTATIN CALCIUM 80 MG/1
40 TABLET, FILM COATED ORAL AT BEDTIME
Refills: 0 | Status: DISCONTINUED | OUTPATIENT
Start: 2023-04-16 | End: 2023-04-18

## 2023-04-16 RX ORDER — INSULIN LISPRO 100/ML
VIAL (ML) SUBCUTANEOUS AT BEDTIME
Refills: 0 | Status: DISCONTINUED | OUTPATIENT
Start: 2023-04-16 | End: 2023-04-18

## 2023-04-16 RX ORDER — LACTOBACILLUS ACIDOPHILUS 100MM CELL
1 CAPSULE ORAL DAILY
Refills: 0 | Status: DISCONTINUED | OUTPATIENT
Start: 2023-04-16 | End: 2023-04-18

## 2023-04-16 RX ORDER — TIOTROPIUM BROMIDE 18 UG/1
2 CAPSULE ORAL; RESPIRATORY (INHALATION) DAILY
Refills: 0 | Status: DISCONTINUED | OUTPATIENT
Start: 2023-04-16 | End: 2023-04-18

## 2023-04-16 RX ORDER — ASPIRIN/CALCIUM CARB/MAGNESIUM 324 MG
81 TABLET ORAL ONCE
Refills: 0 | Status: COMPLETED | OUTPATIENT
Start: 2023-04-16 | End: 2023-04-16

## 2023-04-16 RX ORDER — SODIUM CHLORIDE 9 MG/ML
1000 INJECTION, SOLUTION INTRAVENOUS
Refills: 0 | Status: DISCONTINUED | OUTPATIENT
Start: 2023-04-16 | End: 2023-04-18

## 2023-04-16 RX ORDER — ALBUTEROL 90 UG/1
2 AEROSOL, METERED ORAL EVERY 6 HOURS
Refills: 0 | Status: DISCONTINUED | OUTPATIENT
Start: 2023-04-16 | End: 2023-04-18

## 2023-04-16 RX ORDER — ASPIRIN/CALCIUM CARB/MAGNESIUM 324 MG
81 TABLET ORAL DAILY
Refills: 0 | Status: DISCONTINUED | OUTPATIENT
Start: 2023-04-16 | End: 2023-04-18

## 2023-04-16 RX ORDER — DIGOXIN 250 MCG
125 TABLET ORAL DAILY
Refills: 0 | Status: DISCONTINUED | OUTPATIENT
Start: 2023-04-16 | End: 2023-04-18

## 2023-04-16 RX ADMIN — CARVEDILOL PHOSPHATE 12.5 MILLIGRAM(S): 80 CAPSULE, EXTENDED RELEASE ORAL at 22:06

## 2023-04-16 RX ADMIN — TIOTROPIUM BROMIDE 2 PUFF(S): 18 CAPSULE ORAL; RESPIRATORY (INHALATION) at 09:49

## 2023-04-16 RX ADMIN — Medication 1 TABLET(S): at 11:09

## 2023-04-16 RX ADMIN — ATORVASTATIN CALCIUM 40 MILLIGRAM(S): 80 TABLET, FILM COATED ORAL at 22:07

## 2023-04-16 RX ADMIN — Medication 125 MICROGRAM(S): at 11:09

## 2023-04-16 RX ADMIN — CARVEDILOL PHOSPHATE 12.5 MILLIGRAM(S): 80 CAPSULE, EXTENDED RELEASE ORAL at 11:10

## 2023-04-16 RX ADMIN — WARFARIN SODIUM 1 MILLIGRAM(S): 2.5 TABLET ORAL at 22:06

## 2023-04-16 RX ADMIN — Medication 81 MILLIGRAM(S): at 11:10

## 2023-04-16 RX ADMIN — Medication 81 MILLIGRAM(S): at 04:32

## 2023-04-16 RX ADMIN — WARFARIN SODIUM 1 MILLIGRAM(S): 2.5 TABLET ORAL at 04:32

## 2023-04-16 RX ADMIN — Medication 0.5 MILLIGRAM(S): at 09:48

## 2023-04-16 NOTE — SWALLOW BEDSIDE ASSESSMENT ADULT - ASR SWALLOW DENTITION
would you like to obtain your AVS? Mail a copy    Chief Complaint  Cath removal     HPI  Andre Alas is a very pleasant 70 year old male who presents with a history of CVA 2014, NSTEMI, Elevated PSA (Prostate Specific Antigen) and BPH w/ h/o HOLEP in 2018 (tx of median lobe only given ball-valve visualized and aim was to treat retention rather than a more radical approach in case he needed RALP in the future), h/o gl 3+3 PCA diagnosed 2018 via 12-core bx by Dr. Guadarrama (4 cores on right, 1 on left, tissue involved = 5%). PSA was 10 at the time of HOLEP, and subsequently otilia earlier this year to 14.6.      When last seen by Dr. Oliveros in 2018 s/p HOLEP, his PVR was 419 and he was not catheterizing. Had even higher PVR at last visit with Dr. Pink, and started on tamsulosin, which he reports has improved symptoms.     Struggles with erection maintenance, not bothered by this and not interested in treatment.    Now s/p TRUS with [place] 3+4 prostate cancer. No complications since biopsy. Underwent robotic-assisted laparoscopic radical prostatectomy with bilateral pelvic lymphadenectomy 2/4/22 with Dr. Pink. Margins and nodes clear.     TRUS 11/11/2021  Final Diagnosis   A. Prostate, left base, biopsy-  Adenocarcinoma, Aleksandar's grade 3/3 in 1 of 2 needle core biopsies and 10% of submitted tissue    B. Prostate, left mid, biopsy-  Benign prostate tissue    C.  Prostate, left apex, biopsy-  Adenocarcinoma, Aleksandar's grade 3/4 in 2 of 2 needle core biopsies and 10% of submitted tissue    D.  Prostate, right base, biopsy-  Benign prostate tissue    E.  Prostate, right mid, biopsy-  Benign prostate tissue    F.  Prostate, right apex, biopsy  Adenocarcinoma, [place]'s grade 3/4 in 2 of 2 needle core biopsies and 15% of submitted tissue    G.  Prostate, not otherwise specified, target #1, biopsy-  Adenocarcinoma, [place]'s grade 3/4 in 2 of 2 needle core biopsies and 15% of submitted tissue    H.  Prostate, not  otherwise specified, target #2, biopsy-  Atypical small gland proliferation, suspicious for adenocarcinoma      Surgical Path 2/4/22  A.  Radical prostatectomy:  (SEE SYNOPTIC DIAGNOSIS)  - Prostatic adenocarcinoma, small acinar cell type:    - WHO Grade Group 2 (see comment)       - Aleksandar score 7 , patterns 3+4 (<1% of pattern 4, preponderant pattern 3 tumor)    - Proportion of prostatic tissue involved by tumor:  5%    - Size of dominant nodule:  7.1 x 6.6 x 4.6 cm    - Intraductal carcinoma: None identified    - Perineural invasion: PRESENT (isolated, block A4)    - Extraprostatic extension: None identified    - Seminal vesicle and vas involvement: None identified    - Lymphovascular invasion: None identified    - Margins of resection: Uninvolved    - Stage:  pT2 pN0    B.  Right and left pelvic regional lymph nodes:  - Negative for malignancy in each of 5 lymph nodes identified.    C.  Right bladder neck margin:  - Negative for malignancy.      PSA  10/12/2021 - 15.10  8/3/2021 - 14.60  12/18/2018 - 9.96     MRI Prostate 10/12/2021  IMPRESSION:  1. Based on the most suspicious abnormality, this exam is  characterized as PIRADS 4 - Clinically significant cancer is likely to  be present.  The most suspicious abnormality is located at the Right  apex peripheral zone at the 7 o'clock and there is minimal capsular  abutment with no convincing evidence of extraprostatic extension. The  diffusion restriction in this region is new from comparison exam.  2. PIRADS 3 area within the left apex peripheral zone at the 5 o'clock  position.  3. No suspicious adenopathy or evidence of pelvic metastases.    I have reviewed and updated the patient's Past Medical History, Social History, Family History and Medication List.      ALLERGIES  Dust mites and Mold    Vitals:  No vitals were obtained today due to virtual visit.    Physical Exam   : Clear, balloon deflated and removed intact.     Outside and Past Medical  records:  Review of the result(s) of each unique test - PSA, MRI, pathology    ASSESSMENT and PLAN  Andre Alas is a 70 with previous [place] 3+3 prostate cancer since 2018 on active surveillance, but now with Aleksandar 3+4=7 disease on repeat biopsy, with rising PSA. History of BPH with partial HoLEP in 2018 with resection of median lobe. MRI with PIRADS 4 lesion.   S/p RALP    -Pelvic floor PT referral   -PSA in 3 months.     28 total minutes spent on the date of the encounter including direct interaction with the patient, performing chart review, documentation and further activities as noted above.           Notable for maxillary bridge placement and natural mandibular dentition with some missing posterior lower teeth.

## 2023-04-16 NOTE — CONSULT NOTE ADULT - SUBJECTIVE AND OBJECTIVE BOX
PCP:    REQUESTING PHYSICIAN:    REASON FOR CONSULT:    CHIEF COMPLAINT:    HPI:  89 y/o M w/ PMH of chronic a-fib, CAD, pre-DM, dyslipidemia, DVT on coumadin, factor V leiden, cardiomyopathy, ADRY, p/w unsteady gait. Patient states he woke up on wednesday at 8am, and when he tried to get up from bed, he felt LH. States that when he started walking he felt unsteady in his gait. At baseline, patient uses a cane to walk. However, patient had difficulty walking from room to room, or even walking to the bathroom due to unsteady gait. States that when he felt this sensation, he just wanted to be off his feet. Denies syncope. Patient states Dr. Javed had also previously mentioned that he had pauses on EKG. Denies cough, runny nose, sore throat, nausea, vomiting, abdmoinal pain       PSH: Appendectomy    Social Hx: Denies tobacco / etoh / drugs     Family Hx: Breast cancer, DM    (16 Apr 2023 03:38)      PAST MEDICAL & SURGICAL HISTORY:  Afib      DVT (deep venous thrombosis)      Hyperlipemia      H/O cardiomyopathy      Factor V Leiden      History of appendectomy          Allergies    penicillin (Unknown)  pantoprazole (Unknown)    Intolerances        SOCIAL HISTORY:    Social History:      FAMILY HISTORY:  FH: diabetes mellitus    FH: breast cancer        MEDICATIONS:  MEDICATIONS  (STANDING):  aspirin enteric coated 81 milliGRAM(s) Oral daily  atorvastatin 40 milliGRAM(s) Oral at bedtime  buDESOnide    Inhalation Suspension 0.5 milliGRAM(s) Inhalation daily  carvedilol 12.5 milliGRAM(s) Oral every 12 hours  digoxin     Tablet 125 MICROGram(s) Oral daily  lactobacillus acidophilus 1 Tablet(s) Oral daily  multivitamin 1 Tablet(s) Oral daily  tiotropium 2.5 MICROgram(s) Inhaler 2 Puff(s) Inhalation daily    MEDICATIONS  (PRN):  albuterol    90 MICROgram(s) HFA Inhaler 2 Puff(s) Inhalation every 6 hours PRN Bronchospasm      REVIEW OF SYSTEMS:    CONSTITUTIONAL: No weakness, fevers or chills  EYES/ENT: No visual changes;  No vertigo or throat pain   NECK: No pain or stiffness  RESPIRATORY: No cough, wheezing, hemoptysis; No shortness of breath  CARDIOVASCULAR: No chest pain or palpitations  GASTROINTESTINAL: No abdominal or epigastric pain. No nausea, vomiting, or hematemesis; No diarrhea or constipation. No melena or hematochezia.  GENITOURINARY: No dysuria, frequency or hematuria  NEUROLOGICAL: No numbness or weakness  SKIN: No itching, burning, rashes, or lesions   All other review of systems is negative unless indicated above    Vital Signs Last 24 Hrs  T(C): 36.6 (15 Apr 2023 23:15), Max: 36.8 (15 Apr 2023 14:34)  T(F): 97.9 (15 Apr 2023 23:15), Max: 98.3 (15 Apr 2023 14:34)  HR: 66 (16 Apr 2023 09:48) (66 - 94)  BP: 128/68 (16 Apr 2023 00:23) (116/62 - 163/87)  BP(mean): 95 (15 Apr 2023 14:34) (95 - 95)  RR: 18 (15 Apr 2023 23:15) (18 - 18)  SpO2: 94% (16 Apr 2023 09:48) (94% - 96%)    Parameters below as of 16 Apr 2023 09:48  Patient On (Oxygen Delivery Method): room air        I&O's Summary      PHYSICAL EXAM:    Constitutional: NAD, awake and alert, well-developed  HEENT: PERR, EOMI,  No oral cyananosis.  Neck:  supple,  No JVD  Respiratory: Breath sounds are clear bilaterally, No wheezing, rales or rhonchi  Cardiovascular: S1 and S2, regular rate and rhythm, no Murmurs, gallops or rubs  Gastrointestinal: Bowel Sounds present, soft, nontender.   Extremities: No peripheral edema. No clubbing or cyanosis.  Vascular: 2+ peripheral pulses  Neurological: A/O x 3, no focal deficits  Musculoskeletal: no calf tenderness.  Skin: No rashes.      LABS: All Labs Reviewed:                        14.0   7.56  )-----------( 142      ( 16 Apr 2023 08:21 )             44.3                         14.6   7.41  )-----------( 152      ( 15 Apr 2023 15:24 )             45.3     16 Apr 2023 08:21    139    |  110    |  25     ----------------------------<  141    4.6     |  26     |  1.03   15 Apr 2023 15:24    136    |  108    |  25     ----------------------------<  123    5.4     |  26     |  1.25     Ca    9.0        16 Apr 2023 08:21  Ca    9.8        15 Apr 2023 15:24  Mg     2.7       15 Apr 2023 15:24    TPro  7.9    /  Alb  4.0    /  TBili  0.9    /  DBili  x      /  AST  29     /  ALT  44     /  AlkPhos  50     15 Apr 2023 15:24    PT/INR - ( 16 Apr 2023 08:21 )   PT: 21.8 sec;   INR: 1.87 ratio         PTT - ( 16 Apr 2023 08:21 )  PTT:36.8 sec        Blood Culture:         RADIOLOGY/EKG:         CHIEF COMPLAINT: dizziness for last couple of days     HPI:  89 y/o M w/ PMH of chronic a-fib, CAD  Moderate LAD disease  in 2020 ,, pre-DM, dyslipidemia, DVT on coumadin, factor V leiden, cardiomyopathy, ADRY, p/w unsteady gait. Patient states he woke up on wednesday at 8am, and when he tried to get up from bed he felt very lightheaded then unsteady on his feet while walking , felt better when he lays back on bed ,  At baseline, patient uses a cane to walk since his right hip surgery . However, patient had difficulty walking from room to room, or even walking to the bathroom due to unsteady gait. States that when he felt this sensation, he just wanted to be off his feet. Denies syncope. Patient states Dr. Javed had also previously mentioned that he had pauses on EKG. Denies cough, runny nose, sore throat, nausea, vomiting, abdominal pain     Patient symptoms worse with standing , better with supine position , does not drink enough liquids ,  patient denies any palpitation , shortness of breath or chest pain     monitor afib with controlled rate       PAST MEDICAL & SURGICAL HISTORY:  Afib      DVT (deep venous thrombosis)      Hyperlipemia      H/O cardiomyopathy      Factor V Leiden      History of appendectomy    right hip surgery ,       Allergies    penicillin (Unknown)  pantoprazole (Unknown)    Intolerances        SOCIAL HISTORY:  former smoker ,   no alcohol      FAMILY HISTORY:  FH: diabetes mellitus    FH: breast cancer        MEDICATIONS:Home Medications:  Aspirin Enteric Coated 81 mg oral delayed release tablet: 1 tab(s) orally once a day (15 Apr 2023 19:40)  budesonide 0.5 mg/2 mL inhalation suspension: 2 milliliter(s) by nebulizer once a day (15 Apr 2023 19:44)  carvedilol 12.5 mg oral tablet: 1 tab(s) orally 2 times a day (15 Apr 2023 19:40)  digoxin 125 mcg (0.125 mg) oral tablet: 1 tab(s) orally once a day (15 Apr 2023 19:40)  ipratropium-albuterol 0.5 mg-2.5 mg/3 mL inhalation solution: 3 milliliter(s) by nebulizer once a day (15 Apr 2023 19:44)  Multiple Vitamins oral tablet: 1 tab(s) orally once a day (15 Apr 2023 19:40)  Probiotic Formula oral capsule: 1 cap(s) orally once a day (15 Apr 2023 19:40)  rosuvastatin 10 mg oral tablet: 1 tab(s) orally once a day (15 Apr 2023 19:40)  Super B Complex oral tablet: 1 tab(s) orally once a day (15 Apr 2023 19:40)  warfarin 1 mg oral tablet: 1 tab(s) orally once a day (15 Apr 2023 19:44)    MEDICATIONS  (STANDING):  aspirin enteric coated 81 milliGRAM(s) Oral daily  atorvastatin 40 milliGRAM(s) Oral at bedtime  buDESOnide    Inhalation Suspension 0.5 milliGRAM(s) Inhalation daily  carvedilol 12.5 milliGRAM(s) Oral every 12 hours  digoxin     Tablet 125 MICROGram(s) Oral daily  lactobacillus acidophilus 1 Tablet(s) Oral daily  multivitamin 1 Tablet(s) Oral daily  tiotropium 2.5 MICROgram(s) Inhaler 2 Puff(s) Inhalation daily    MEDICATIONS  (PRN):  albuterol    90 MICROgram(s) HFA Inhaler 2 Puff(s) Inhalation every 6 hours PRN Bronchospasm      REVIEW OF SYSTEMS:    CONSTITUTIONAL: No weakness, fevers or chills  EYES/ENT: No visual changes;  No vertigo or throat pain   NECK: No pain or stiffness  RESPIRATORY: No cough, wheezing, hemoptysis; No shortness of breath  CARDIOVASCULAR: No chest pain or palpitations  GASTROINTESTINAL: No abdominal or epigastric pain. No nausea, vomiting, or hematemesis; No diarrhea or constipation. No melena or hematochezia.  GENITOURINARY: No dysuria, frequency or hematuria  NEUROLOGICAL: No numbness or weakness unsteady gait , dizziness   SKIN: No itching, burning, rashes, or lesions   All other review of systems is negative unless indicated above    Vital Signs Last 24 Hrs  T(C): 36.6 (15 Apr 2023 23:15), Max: 36.8 (15 Apr 2023 14:34)  T(F): 97.9 (15 Apr 2023 23:15), Max: 98.3 (15 Apr 2023 14:34)  HR: 66 (16 Apr 2023 09:48) (66 - 94)  BP: 128/68 (16 Apr 2023 00:23) (116/62 - 163/87)  BP(mean): 95 (15 Apr 2023 14:34) (95 - 95)  RR: 18 (15 Apr 2023 23:15) (18 - 18)  SpO2: 94% (16 Apr 2023 09:48) (94% - 96%)    Parameters below as of 16 Apr 2023 09:48  Patient On (Oxygen Delivery Method): room air    Orthostatic VS    04-16-23 @ 15:39  Lying BP: Orthostatic BP (Lying Systolic): 112/Orthostatic BP (Lying Diastolic (mm Hg)): 78 HR: Orthostatic Pulse (Heart Rate (beats/min)): 70   Sitting BP: Orthostatic BP (Sitting Systolic): 114/Orthostatic BP (Sitting Diastolic (mm Hg)): 61 HR: Orthostatic Pulse (Heart Rate (beats/min)): 89  Standing BP: Orthostatic BP (Standing Systolic): 109/Orthostatic BP (Standing Diastolic (mm Hg)): 71 HR: Orthostatic Pulse (Heart Rate (beats/min)): 83  Site: --   Mode: --    04-16-23 @ 10:32  Lying BP: Orthostatic BP (Lying Systolic): 117/Orthostatic BP (Lying Diastolic (mm Hg)): 68 HR: Orthostatic Pulse (Heart Rate (beats/min)): 80   Sitting BP: Orthostatic BP (Sitting Systolic): 151/Orthostatic BP (Sitting Diastolic (mm Hg)): 92 HR: Orthostatic Pulse (Heart Rate (beats/min)): 88  Standing BP: Orthostatic BP (Standing Systolic): 123/Orthostatic BP (Standing Diastolic (mm Hg)): 99 HR: Orthostatic Pulse (Heart Rate (beats/min)): 102  Site: --   Mode: --      I&O's Summary      PHYSICAL EXAM:    Constitutional: NAD, awake and alert, well-developed  HEENT: PERR, EOMI,  No oral cyananosis.  Neck:  supple,  No JVD  Respiratory: Breath sounds are clear bilaterally, No wheezing, rales or rhonchi  Cardiovascular: S1 and S2, IR IR ESM   Gastrointestinal: Bowel Sounds present, soft, nontender.   Extremities: No peripheral edema. No clubbing or cyanosis.  Vascular: 2+ peripheral pulses  Neurological: A/O x 3, unsteady gait   Musculoskeletal: no calf tenderness.  Skin: No rashes.      LABS: All Labs Reviewed:                        14.0   7.56  )-----------( 142      ( 16 Apr 2023 08:21 )             44.3                         14.6   7.41  )-----------( 152      ( 15 Apr 2023 15:24 )             45.3     16 Apr 2023 08:21    139    |  110    |  25     ----------------------------<  141    4.6     |  26     |  1.03   15 Apr 2023 15:24    136    |  108    |  25     ----------------------------<  123    5.4     |  26     |  1.25     Ca    9.0        16 Apr 2023 08:21  Ca    9.8        15 Apr 2023 15:24  Mg     2.7       15 Apr 2023 15:24    TPro  7.9    /  Alb  4.0    /  TBili  0.9    /  DBili  x      /  AST  29     /  ALT  44     /  AlkPhos  50     15 Apr 2023 15:24    PT/INR - ( 16 Apr 2023 08:21 )   PT: 21.8 sec;   INR: 1.87 ratio         PTT - ( 16 Apr 2023 08:21 )  PTT:36.8 sec        Blood Culture:       - TroponinI hsT: <-12.74, <-13.32, <-12.96  RADIOLOGY/EKG:  < from: 12 Lead ECG (04.15.23 @ 14:38) >  Atrial fibrillation  Abnormal ECG  When compared with ECG of 10-JUL-2022 13:25,  No significant change was found  Confirmed by WAI HAMILTON ([id number]) on 4/16/2023 9:39:38 AM    < end of copied text >    < from: CT Head No Cont (04.15.23 @ 17:15) >  IMPRESSION: No acute hemorrhage mass or mass effect.    < end of copied text >  < from: TTE Echo Complete w/o Contrast w/ Doppler (07.12.22 @ 09:36) >   Mild mitral annular calcification is present.   The mitral valve leaflets appear thickened.   Mild (1+) mitral regurgitation is present.   The aortic root is at the upper limits of normal in size.   The ascending aorta appears yousuf minimally dilated.   The aortic valve appears moderately calcified. Valve opening seems to be   restricted.   Peak and mean transaortic gradients are 25 and 16 mmHg respectively; this   finding is consistent with mild aortic stenosis.   Mild (1+) aortic regurgitation is present.   The tricuspid valve leaflets are thin and pliable; valve motion is   normal.   Mild (1+) tricuspid valve regurgitation is present.   Mild pulmonary hypertension.   Mild concentric left ventricular hypertrophy is present.   Left ventricle systolic function appears to be a t the lower limits of   normal in the presence of a cardiac arrhythmia. Visual estimation of left   ventricle ejection fraction is 50 %.    < end of copied text >

## 2023-04-16 NOTE — PHYSICAL THERAPY INITIAL EVALUATION ADULT - DIAGNOSIS, PT EVAL
LH / unsteady gait - R/o TIA vs CVA vs orthostatic hypotension vs r/o arrhythmia vs NPH, CTH: Dilated ventricles out of portion sulci seen which could be compatible with NPH in the correct clinical setting.
Patient was seen in supine on bed,+knee brace on R LE

## 2023-04-16 NOTE — CONSULT NOTE ADULT - ASSESSMENT
89 y/o man w/ PMH of chronic a-fib, CAD, pre-DM, dyslipidemia, DVT on coumadin, factor V leiden, cardiomyopathy, ADRY, p/w unsteady gait, non focal exam. head CT unchanged, no acute changes, ? NPH.  Suggest:  f/u brain MRI  PT, gait retraining  check vit b12    Dr. Amor will be on service form tomorrow, f/u as needed.

## 2023-04-16 NOTE — SWALLOW BEDSIDE ASSESSMENT ADULT - SWALLOW EVAL: DIAGNOSIS
1) The pt exhibits functional Oropharyngeal Swallowing abilities for age. Bolus formation/transfer were mechanically functional for age and laryngeal lift on palpation was felt to be functional for age as well. NO behavioral aspiration signs exhibited. No change in O2 sats noted. Odynophagia denied.

## 2023-04-16 NOTE — H&P ADULT - RESPIRATORY
Pt is 80 y/o male W/C bound at baseline but can transfer with assistance presenting w/worsening weakness, AMS, LLQ abd pain aggrevated by movement x 3-4 days. Pt treated for UTI for past 2 weeks. Pt was at [facility] for transfusion due to MDS and transferred to [facility] due to AMS. PMH ADD, AFib, CAD (stent), CHF, DM, HLD, HTN, Hypothyroid, h/o c.Diff., DVT PPx SCDs normal/clear to auscultation bilaterally/no wheezes/no rales/no rhonchi

## 2023-04-16 NOTE — SWALLOW BEDSIDE ASSESSMENT ADULT - SWALLOW EVAL: CRITERIA FOR SKILLED INTERVENTION MET
NO NEED FOR ACUTE SPEECH PATHOLOGY FOLLOW UP IN HOSPITAL NOR WOULD IT CHANGE CLINICAL MANAGEMENT/OUTCOME AT THIS TIME. PT'S SPEECH-LANGUAGE ABILITIES AND OROPHARYNGEAL SWALLOWING ABILITIES ARE FELT TO BE FUNCTIONAL/AT BASELINE/MAXIMIZED. GIVEN ABOVE, THIS SERVICE WILL NOT ACTIVELY FOLLOW. RECONSULT PRN SHOULD STATUS CHANGE AND CONDITION WARRANT .

## 2023-04-16 NOTE — H&P ADULT - ASSESSMENT
87 y/o M w/ PMH of chronic a-fib, CAD, pre-DM, dyslipidemia, DVT on coumadin, factor V leiden, cardiomyopathy, ADRY, p/w unsteady gait    *LH / unsteady gait - R/o TIA vs CVA vs orthostatic hypotension vs r/o arrhythmia vs NPH   -CTH: Dilated ventricles out of portion sulci seen which could be compatible with NPH in the correct clinical setting.  -MRI  -Neuro checks  -Neuro consult  -Tele monitoring  -Echo  -EKG: A-fib   -ASA / Statin  -HbA1c / lipid panel  -PT consult  -Speech and swallow consult   -Check orthostatics  -Troponin negative x 2   -Cardio consult     *Mild hyperkalemia   -Check digoxin level   -Recheck K in AM     *H/o chronic a-fib / CAD / pre-DM/ dyslipdiemia / DVT / factor V leiden / cardiomyopathy / ADRY  -C/w home meds and f/u outpatient for further management if conditions remain stable during hospitalization     *DVT ppx   -On Coumadin

## 2023-04-16 NOTE — SWALLOW BEDSIDE ASSESSMENT ADULT - SWALLOW EVAL: PROGNOSIS
2) The pt is alert and interactive. He was able to verbalize during communicative probes via intelligible linguistically intact utterances that were produced without evidence of a primary motor speech or primary linguistic pathology. The pt was able to verbalize needs and feels that he is at speech-language baseline.

## 2023-04-16 NOTE — CONSULT NOTE ADULT - PROBLEM SELECTOR RECOMMENDATION 9
unsteady gait  worse with standing   no evidence of immediate orthostatic hypotension ?  controlled atrial fibrillation  heart rate , vestibular origin  less likely cardiac origin , neurologic work up , repeat orthostatic BP , encourage patient to increase fluid intake ,

## 2023-04-16 NOTE — SWALLOW BEDSIDE ASSESSMENT ADULT - COMMENTS
Pt admitted to  with feelings of lightheadedness and unsteady gait that are improving. He has an underlying history of cardiomyopathy, atrial fibrillation. HLD, Factor V Leiden, prior DVT, and past appy.

## 2023-04-16 NOTE — H&P ADULT - HISTORY OF PRESENT ILLNESS
89 y/o M w/ PMH of chronic a-fib, CAD, pre-DM, dyslipidemia, DVT on coumadin, factor V leiden, cardiomyopathy, ADRY, p/w unsteady gait. Patient states he woke up on wednesday at 8am, and when he tried to get up from bed, he felt LH. States that when he started walking he felt unsteady in his gait. At baseline, patient uses a cane to walk. However, patient had difficulty walking from room to room, or even walking to the bathroom due to unsteady gait. States that when he felt this sensation, he just wanted to be off his feet. Denies syncope. Patient states Dr. Javed had also previously mentioned that he had pauses on EKG. Denies cough, runny nose, sore throat, nausea, vomiting, abdmoinal pain       PSH: Appendectomy    Social Hx: Denies tobacco / etoh / drugs     Family Hx: Breast cancer, DM

## 2023-04-16 NOTE — SWALLOW BEDSIDE ASSESSMENT ADULT - SWALLOW EVAL: RECOMMENDED DIET
SUGGEST A REGULAR TEXTURE DIET WITH THIN LIQUID CONSISTENCIES AS THE PATIENT APPEARED CLINICALLY TOLERANT OF THESE FOOD CONSISTENCIES FROM AN OROPHARYNGEAL SWALLOWING PERSPECTIVE ON CLINICAL EXAM.

## 2023-04-16 NOTE — CONSULT NOTE ADULT - SUBJECTIVE AND OBJECTIVE BOX
Neurology Consult requested by:   Patient is a 88y old  Male who presents with a chief complaint of unsteady gait (16 Apr 2023 03:38)     HPI:  89 y/o M w/ PMH of chronic a-fib, CAD, pre-DM, dyslipidemia, DVT on coumadin, factor V leiden, cardiomyopathy, ADRY, p/w unsteady gait. Patient states he woke up on wednesday at 8am, and when he tried to get up from bed, he felt lightheaded, very weak, no vertigo, no unilateral leg weakness. At baseline, patient uses a cane to walk. Feels bets when he is lying down.  NO associated slurred speech, change in vsion, no unilteral facial, arm, leg weakness.    PSH: Appendectomy    Social Hx: Denies tobacco / etoh / drugs     Family Hx: Breast cancer, DM    (16 Apr 2023 03:38)      PAST MEDICAL & SURGICAL HISTORY:  Afib      DVT (deep venous thrombosis)      Hyperlipemia      H/O cardiomyopathy      Factor V Leiden      History of appendectomy        FAMILY HISTORY:  FH: diabetes mellitus    FH: breast cancer      Social Hx:  Nonsmoker, no drug or alcohol use  Medications and Allergies ReviewedMEDICATIONS  (STANDING):  aspirin enteric coated 81 milliGRAM(s) Oral daily  atorvastatin 40 milliGRAM(s) Oral at bedtime  buDESOnide    Inhalation Suspension 0.5 milliGRAM(s) Inhalation daily  carvedilol 12.5 milliGRAM(s) Oral every 12 hours  digoxin     Tablet 125 MICROGram(s) Oral daily  lactobacillus acidophilus 1 Tablet(s) Oral daily  multivitamin 1 Tablet(s) Oral daily  tiotropium 2.5 MICROgram(s) Inhaler 2 Puff(s) Inhalation daily     ROS: Pertinent positives in HPI, all other ROS were reviewed and are negative.      Examination:   Vital Signs Last 24 Hrs  T(C): 36.6 (15 Apr 2023 23:15), Max: 36.8 (15 Apr 2023 14:34)  T(F): 97.9 (15 Apr 2023 23:15), Max: 98.3 (15 Apr 2023 14:34)  HR: 66 (16 Apr 2023 09:48) (66 - 94)  BP: 128/68 (16 Apr 2023 00:23) (116/62 - 163/87)  BP(mean): 95 (15 Apr 2023 14:34) (95 - 95)  RR: 18 (15 Apr 2023 23:15) (18 - 18)  SpO2: 94% (16 Apr 2023 09:48) (94% - 96%)    Parameters below as of 16 Apr 2023 09:48  Patient On (Oxygen Delivery Method): room air      General: Cooperative, NAD   NECK: supple, no masses  ENT: Normal hearing   Vascular : no carotid bruits,   Lungs: CTAB  Chest: RRR, no murmurs  Extremities: nontender, no edema  Musculoskeletal: no adventitious movements, no joint stiffness  Skin: no rash  Orthostatic VS    04-16-23 @ 10:32  Lying BP: Orthostatic BP (Lying Systolic): 117/Orthostatic BP (Lying Diastolic (mm Hg)): 68 HR: Orthostatic Pulse (Heart Rate (beats/min)): 80   Sitting BP: Orthostatic BP (Sitting Systolic): 151/Orthostatic BP (Sitting Diastolic (mm Hg)): 92 HR: Orthostatic Pulse (Heart Rate (beats/min)): 88  Standing BP: Orthostatic BP (Standing Systolic): 123/Orthostatic BP (Standing Diastolic (mm Hg)): 99 HR: Orthostatic Pulse (Heart Rate (beats/min)): 102      Neurological Examination:  NIHSS:0  MS: AOx3. Appropriately interactive, normal affect. Speech fluent w/o paraphasic error, repetition, naming, reading is intact   CN: VFFTC, PERLL, EOMI, V1-3 sensation intact, face symmetric, hearing intact, palate elevates symmetrically, tongue midline, SCM equal bilaterally  Motor: normal bulk and tone, no tremor, rigidity or bradykinesia.  5/5 all over   Sens: Intact to light touch.    Reflexes: 0-1/4 all over, downgoing toes b/l  Coord:  No dysmetria, CONCHITA intact   Gait: shortened based gait, hesitant    Labs: Reviewed  Basic Metabolic Panel (04.16.23 @ 08:21)   Sodium, Serum: 139 mmol/L  Potassium, Serum: 4.6 mmol/L  Chloride, Serum: 110 mmol/L  Carbon Dioxide, Serum: 26 mmol/L  Anion Gap, Serum: 3 mmol/L  Blood Urea Nitrogen, Serum: 25 mg/dL  Creatinine, Serum: 1.03 mg/dL  Glucose, Serum: 141 mg/dL  Calcium, Total Serum: 9.0 mg/dL  eGFR: 70:             Imaging:   < from: CT Head No Cont (04.15.23 @ 17:15) >  INTERPRETATION:  Clinical indication: Dizziness. Ataxia.    Multiple axial sections were performed from base of vertex without   contrast enhancement. Coronal and sagittal reconstructions were performed.    This exam is compared with prior head CT performed on April 15, 2022    Parenchymal volume loss and chronic microvascular ischemic changes are   identified.    Dilated ventricles out of portion sulci seen which could be compatible   with NPH in the correct clinical setting.    There is no acute hemorrhage mass or mass effect seen    Evaluation of the osseous structures with the appropriate window appears   unremarkable    Left maxillary polyp versus retention cyst is seen.    IMPRESSION: No acute hemorrhage mass or mass effect.    < end of copied text >

## 2023-04-16 NOTE — PHYSICAL THERAPY INITIAL EVALUATION ADULT - PERTINENT HX OF CURRENT PROBLEM, REHAB EVAL
89 y/o M w/ PMH of chronic a-fib, CAD, pre-DM, dyslipidemia, DVT on coumadin, factor V leiden, cardiomyopathy, ADRY, p/w unsteady gait. Patient states he woke up on wednesday at 8am, and when he tried to get up from bed, he felt LH. States that when he started walking he felt unsteady in his gait. At baseline, patient uses a cane to walk. However, patient had difficulty walking from room to room, or even walking to the bathroom due to unsteady gait. States that when he felt this sensation, he just wanted to be off his feet. Denies syncope. Patient states Dr. Javed had also previously mentioned that he had pauses on EKG.

## 2023-04-16 NOTE — SWALLOW BEDSIDE ASSESSMENT ADULT - NS SPL SWALLOW CLINIC TRIAL FT
The pt exhibited functional Oropharyngeal Swallowing abilities for age. Bolus formation/transfer were mechanically functional for age and laryngeal lift on palpation was felt to be functional for age as well. NO behavioral aspiration signs exhibited. No change in O2 sats noted. Odynophagia denied.

## 2023-04-16 NOTE — SWALLOW BEDSIDE ASSESSMENT ADULT - SLP GENERAL OBSERVATIONS
The pt is alert and interactive. He was able to verbalize during communicative probes via intelligible linguistically intact utterances that were produced without evidence of a primary motor speech or primary linguistic pathology. The pt was able to verbalize needs and feels that he is at speech-language baseline.

## 2023-04-17 ENCOUNTER — TRANSCRIPTION ENCOUNTER (OUTPATIENT)
Age: 88
End: 2023-04-17

## 2023-04-17 LAB
ANION GAP SERPL CALC-SCNC: 4 MMOL/L — LOW (ref 5–17)
BUN SERPL-MCNC: 22 MG/DL — SIGNIFICANT CHANGE UP (ref 7–23)
CALCIUM SERPL-MCNC: 9 MG/DL — SIGNIFICANT CHANGE UP (ref 8.5–10.1)
CHLORIDE SERPL-SCNC: 111 MMOL/L — HIGH (ref 96–108)
CO2 SERPL-SCNC: 23 MMOL/L — SIGNIFICANT CHANGE UP (ref 22–31)
CREAT SERPL-MCNC: 0.98 MG/DL — SIGNIFICANT CHANGE UP (ref 0.5–1.3)
EGFR: 74 ML/MIN/1.73M2 — SIGNIFICANT CHANGE UP
GLUCOSE BLDC GLUCOMTR-MCNC: 120 MG/DL — HIGH (ref 70–99)
GLUCOSE BLDC GLUCOMTR-MCNC: 135 MG/DL — HIGH (ref 70–99)
GLUCOSE BLDC GLUCOMTR-MCNC: 138 MG/DL — HIGH (ref 70–99)
GLUCOSE BLDC GLUCOMTR-MCNC: 146 MG/DL — HIGH (ref 70–99)
GLUCOSE SERPL-MCNC: 158 MG/DL — HIGH (ref 70–99)
HCT VFR BLD CALC: 46.9 % — SIGNIFICANT CHANGE UP (ref 39–50)
HGB BLD-MCNC: 15.3 G/DL — SIGNIFICANT CHANGE UP (ref 13–17)
INR BLD: 1.88 RATIO — HIGH (ref 0.88–1.16)
MCHC RBC-ENTMCNC: 32.6 GM/DL — SIGNIFICANT CHANGE UP (ref 32–36)
MCHC RBC-ENTMCNC: 32.8 PG — SIGNIFICANT CHANGE UP (ref 27–34)
MCV RBC AUTO: 100.6 FL — HIGH (ref 80–100)
PLATELET # BLD AUTO: 137 K/UL — LOW (ref 150–400)
POTASSIUM SERPL-MCNC: 3.9 MMOL/L — SIGNIFICANT CHANGE UP (ref 3.5–5.3)
POTASSIUM SERPL-SCNC: 3.9 MMOL/L — SIGNIFICANT CHANGE UP (ref 3.5–5.3)
PROTHROM AB SERPL-ACNC: 21.9 SEC — HIGH (ref 10.5–13.4)
RBC # BLD: 4.66 M/UL — SIGNIFICANT CHANGE UP (ref 4.2–5.8)
RBC # FLD: 13.6 % — SIGNIFICANT CHANGE UP (ref 10.3–14.5)
SODIUM SERPL-SCNC: 138 MMOL/L — SIGNIFICANT CHANGE UP (ref 135–145)
VIT B12 SERPL-MCNC: 577 PG/ML — SIGNIFICANT CHANGE UP (ref 232–1245)
WBC # BLD: 8.35 K/UL — SIGNIFICANT CHANGE UP (ref 3.8–10.5)
WBC # FLD AUTO: 8.35 K/UL — SIGNIFICANT CHANGE UP (ref 3.8–10.5)

## 2023-04-17 PROCEDURE — 99232 SBSQ HOSP IP/OBS MODERATE 35: CPT

## 2023-04-17 PROCEDURE — 70544 MR ANGIOGRAPHY HEAD W/O DYE: CPT | Mod: 26,59

## 2023-04-17 PROCEDURE — 99233 SBSQ HOSP IP/OBS HIGH 50: CPT

## 2023-04-17 PROCEDURE — 70551 MRI BRAIN STEM W/O DYE: CPT | Mod: 26

## 2023-04-17 RX ORDER — WARFARIN SODIUM 2.5 MG/1
1 TABLET ORAL ONCE
Refills: 0 | Status: COMPLETED | OUTPATIENT
Start: 2023-04-17 | End: 2023-04-17

## 2023-04-17 RX ADMIN — CARVEDILOL PHOSPHATE 12.5 MILLIGRAM(S): 80 CAPSULE, EXTENDED RELEASE ORAL at 21:41

## 2023-04-17 RX ADMIN — Medication 1 TABLET(S): at 10:41

## 2023-04-17 RX ADMIN — Medication 1 TABLET(S): at 10:42

## 2023-04-17 RX ADMIN — TIOTROPIUM BROMIDE 2 PUFF(S): 18 CAPSULE ORAL; RESPIRATORY (INHALATION) at 08:47

## 2023-04-17 RX ADMIN — WARFARIN SODIUM 1 MILLIGRAM(S): 2.5 TABLET ORAL at 21:41

## 2023-04-17 RX ADMIN — ATORVASTATIN CALCIUM 40 MILLIGRAM(S): 80 TABLET, FILM COATED ORAL at 21:40

## 2023-04-17 RX ADMIN — Medication 81 MILLIGRAM(S): at 10:41

## 2023-04-17 RX ADMIN — ALBUTEROL 2 PUFF(S): 90 AEROSOL, METERED ORAL at 08:46

## 2023-04-17 RX ADMIN — CARVEDILOL PHOSPHATE 12.5 MILLIGRAM(S): 80 CAPSULE, EXTENDED RELEASE ORAL at 10:41

## 2023-04-17 RX ADMIN — Medication 125 MICROGRAM(S): at 10:41

## 2023-04-17 NOTE — DISCHARGE NOTE NURSING/CASE MANAGEMENT/SOCIAL WORK - PATIENT PORTAL LINK FT
You can access the FollowMyHealth Patient Portal offered by [facility] by registering at the following website: [email or web address]. By joining [facility]’s FollowMyHealth portal, you will also be able to view your health information using other applications (apps) compatible with our system.

## 2023-04-17 NOTE — DISCHARGE NOTE NURSING/CASE MANAGEMENT/SOCIAL WORK - NSDCPEFALRISK_GEN_ALL_CORE
For information on Fall & Injury Prevention, visit: [email or web address] OR  [email or web address] OR  https://www.cdc.gov/steadi/patient.html

## 2023-04-18 ENCOUNTER — TRANSCRIPTION ENCOUNTER (OUTPATIENT)
Age: 88
End: 2023-04-18

## 2023-04-18 VITALS
TEMPERATURE: 97 F | SYSTOLIC BLOOD PRESSURE: 131 MMHG | RESPIRATION RATE: 18 BRPM | OXYGEN SATURATION: 95 % | DIASTOLIC BLOOD PRESSURE: 77 MMHG | HEART RATE: 98 BPM

## 2023-04-18 LAB
DIGOXIN SERPL-MCNC: 0.47 NG/ML — LOW (ref 0.8–2)
GLUCOSE BLDC GLUCOMTR-MCNC: 120 MG/DL — HIGH (ref 70–99)
GLUCOSE BLDC GLUCOMTR-MCNC: 128 MG/DL — HIGH (ref 70–99)
INR BLD: 2 RATIO — HIGH (ref 0.88–1.16)
PROTHROM AB SERPL-ACNC: 23.4 SEC — HIGH (ref 10.5–13.4)

## 2023-04-18 PROCEDURE — 99239 HOSP IP/OBS DSCHRG MGMT >30: CPT

## 2023-04-18 PROCEDURE — 99232 SBSQ HOSP IP/OBS MODERATE 35: CPT

## 2023-04-18 PROCEDURE — 93306 TTE W/DOPPLER COMPLETE: CPT | Mod: 26

## 2023-04-18 PROCEDURE — 99233 SBSQ HOSP IP/OBS HIGH 50: CPT

## 2023-04-18 RX ORDER — TIOTROPIUM BROMIDE 18 UG/1
2 CAPSULE ORAL; RESPIRATORY (INHALATION)
Qty: 0 | Refills: 0 | DISCHARGE
Start: 2023-04-18

## 2023-04-18 RX ORDER — ALBUTEROL 90 UG/1
2 AEROSOL, METERED ORAL
Qty: 0 | Refills: 0 | DISCHARGE
Start: 2023-04-18

## 2023-04-18 RX ADMIN — Medication 1 TABLET(S): at 09:38

## 2023-04-18 RX ADMIN — CARVEDILOL PHOSPHATE 12.5 MILLIGRAM(S): 80 CAPSULE, EXTENDED RELEASE ORAL at 09:38

## 2023-04-18 RX ADMIN — TIOTROPIUM BROMIDE 2 PUFF(S): 18 CAPSULE ORAL; RESPIRATORY (INHALATION) at 10:18

## 2023-04-18 RX ADMIN — ALBUTEROL 2 PUFF(S): 90 AEROSOL, METERED ORAL at 10:17

## 2023-04-18 RX ADMIN — Medication 81 MILLIGRAM(S): at 09:38

## 2023-04-18 RX ADMIN — Medication 125 MICROGRAM(S): at 09:38

## 2023-04-18 NOTE — DISCHARGE NOTE PROVIDER - HOSPITAL COURSE
87 y/o M w/ PMH of chronic a-fib, CAD, pre-DM, dyslipidemia, DVT on coumadin, factor V leiden, cardiomyopathy, ADRY, p/w unsteady gait. Patient states he woke up on wednesday at 8am, and when he tried to get up from bed, he felt LH. States that when he started walking he felt unsteady in his gait. At baseline, patient uses a cane to walk. However, patient had difficulty walking from room to room, or even walking to the bathroom due to unsteady gait. States that when he felt this sensation, he just wanted to be off his feet. Denies syncope. Patient states Dr. Javed had also previously mentioned that he had pauses on EKG. Denies cough, runny nose, sore throat, nausea, vomiting, abdmoinal pain     Physical Exam:   GENERAL APPEARANCE:  NAD, hemodynamically stable  T(C): 36.3 (04-18-23 @ 16:23), Max: 36.7 (04-17-23 @ 20:58)  HR: 98 (04-18-23 @ 16:23) (60 - 98)  BP: 131/77 (04-18-23 @ 16:23) (131/77 - 151/69)  RR: 18 (04-18-23 @ 16:23) (18 - 18)  SpO2: 95% (04-18-23 @ 16:23) (95% - 96%)  HEENT:  Head is normocephalic    Skin:  Warm and dry without any rash   NECK:  Supple without lymphadenopathy.   HEART:  Regular rate and rhythm. normal S1 and S2, No M/R/G  LUNGS:  Good ins/exp effort, no W/R/R/C  ABDOMEN:  Soft, nontender, nondistended with good bowel sounds heard  EXTREMITIES:  Without cyanosis, clubbing or edema.   NEUROLOGICAL:  Gross nonfocal

## 2023-04-18 NOTE — DISCHARGE NOTE PROVIDER - NSDCFUADDAPPT_GEN_ALL_CORE_FT
Follow-up appointment with Dr. Palla on Wednesday April 26, 2023 at 3:30pm    Close follow up with ENT    Outpatient follow up with Vestibular training

## 2023-04-18 NOTE — PROGRESS NOTE ADULT - SUBJECTIVE AND OBJECTIVE BOX
HPI: 89 y/o M w/ PMH of chronic a-fib, CAD, pre-DM, dyslipidemia, DVT on coumadin, factor V leiden, cardiomyopathy, ADRY, p/w unsteady gait. Patient states he woke up on wednesday at 8am, and when he tried to get up from bed, he felt LH. States that when he started walking he felt unsteady in his gait. At baseline, patient uses a cane to walk. However, patient had difficulty walking from room to room, or even walking to the bathroom due to unsteady gait. States that when he felt this sensation, he just wanted to be off his feet. Denies syncope. Patient states Dr. Javed had also previously mentioned that he had pauses on EKG. Denies cough, runny nose, sore throat, nausea, vomiting, abdmoinal pain     Subjective: Patient seen at bedside today, feels well, no further episodes of lightheadedness.     REVIEW OF SYSTEMS:    CONSTITUTIONAL: No weakness, fevers or chills  EYES/ENT: No visual changes;  No vertigo or throat pain   NECK: No pain or stiffness  RESPIRATORY: No cough, wheezing, hemoptysis; No shortness of breath  CARDIOVASCULAR: No chest pain or palpitations  GASTROINTESTINAL: No abdominal or epigastric pain. No nausea, vomiting, or hematemesis; No diarrhea or constipation. No melena or hematochezia.  GENITOURINARY: No dysuria, frequency or hematuria  NEUROLOGICAL: No numbness or weakness  SKIN: No itching, rashes    Vital Signs Last 24 Hrs  T(C): 36.5 (2023 15:56), Max: 36.7 (2023 21:14)  T(F): 97.7 (2023 15:56), Max: 98 (2023 21:14)  HR: 77 (2023 15:56) (77 - 106)  BP: 119/76 (2023 15:56) (119/76 - 129/74)  RR: 18 (2023 15:56) (18 - 18)  SpO2: 92% (2023 15:56) (92% - 95%)    Parameters below as of 2023 15:56  Patient On (Oxygen Delivery Method): room air    PHYSICAL EXAM:  GENERAL: NAD, lying in bed comfortably  HEAD:  Atraumatic, Normocephalic  EYES: EOMI, PERRLA, no nystagmus, conjunctiva and sclera clear  ENT: Moist mucous membranes  NECK: Supple, No JVD  CHEST/LUNG: Clear to auscultation bilaterally; No rales, rhonchi, wheezing. Unlabored respirations  HEART: Regular rate and rhythm; No murmurs  ABDOMEN: Bowel sounds present; Soft, Nontender, Nondistended.   EXTREMITIES:  2+ Peripheral Pulses, brisk capillary refill. No clubbing, cyanosis, or edema  NERVOUS SYSTEM:  Alert & Oriented X3, speech clear. No deficits   MSK: FROM all 4 extremities, full and equal strength    MEDICATIONS  (STANDING):  aspirin enteric coated 81 milliGRAM(s) Oral daily  atorvastatin 40 milliGRAM(s) Oral at bedtime  buDESOnide    Inhalation Suspension 0.5 milliGRAM(s) Inhalation daily  carvedilol 12.5 milliGRAM(s) Oral every 12 hours  dextrose 5%. 1000 milliLiter(s) (50 mL/Hr) IV Continuous <Continuous>  dextrose 5%. 1000 milliLiter(s) (100 mL/Hr) IV Continuous <Continuous>  dextrose 50% Injectable 25 Gram(s) IV Push once  dextrose 50% Injectable 25 Gram(s) IV Push once  dextrose 50% Injectable 12.5 Gram(s) IV Push once  digoxin     Tablet 125 MICROGram(s) Oral daily  glucagon  Injectable 1 milliGRAM(s) IntraMuscular once  insulin lispro (ADMELOG) corrective regimen sliding scale   SubCutaneous three times a day before meals  insulin lispro (ADMELOG) corrective regimen sliding scale   SubCutaneous at bedtime  lactobacillus acidophilus 1 Tablet(s) Oral daily  multivitamin 1 Tablet(s) Oral daily  tiotropium 2.5 MICROgram(s) Inhaler 2 Puff(s) Inhalation daily  warfarin 1 milliGRAM(s) Oral once    MEDICATIONS  (PRN):  albuterol    90 MICROgram(s) HFA Inhaler 2 Puff(s) Inhalation every 6 hours PRN Bronchospasm  dextrose Oral Gel 15 Gram(s) Oral once PRN Blood Glucose LESS THAN 70 milliGRAM(s)/deciliter      LABS:                            15.3   8.35  )-----------( 137      ( 2023 08:32 )             46.9   04-17    138  |  111<H>  |  22  ----------------------------<  158<H>  3.9   |  23  |  0.98    Ca    9.0      2023 08:32              Urinalysis Basic - ( 15 Apr 2023 16:06 )    Color: Yellow / Appearance: Clear / S.025 / pH: x  Gluc: x / Ketone: Trace  / Bili: Negative / Urobili: Negative   Blood: x / Protein: 30 mg/dL / Nitrite: Negative   Leuk Esterase: Trace / RBC: 3-5 /HPF / WBC 3-5 /HPF   Sq Epi: x / Non Sq Epi: x / Bacteria: Occasional        RADIOLOGY:      < from: CT Head No Cont (04.15.23 @ 17:15) >  IMPRESSION: No acute hemorrhage mass or mass effect.        
Interval History:  4/18/23: No new events. No complaints today    MEDICATIONS  (STANDING):  aspirin enteric coated 81 milliGRAM(s) Oral daily  atorvastatin 40 milliGRAM(s) Oral at bedtime  buDESOnide    Inhalation Suspension 0.5 milliGRAM(s) Inhalation daily  carvedilol 12.5 milliGRAM(s) Oral every 12 hours  dextrose 5%. 1000 milliLiter(s) (50 mL/Hr) IV Continuous <Continuous>  dextrose 5%. 1000 milliLiter(s) (100 mL/Hr) IV Continuous <Continuous>  dextrose 50% Injectable 12.5 Gram(s) IV Push once  dextrose 50% Injectable 25 Gram(s) IV Push once  dextrose 50% Injectable 25 Gram(s) IV Push once  digoxin     Tablet 125 MICROGram(s) Oral daily  glucagon  Injectable 1 milliGRAM(s) IntraMuscular once  insulin lispro (ADMELOG) corrective regimen sliding scale   SubCutaneous three times a day before meals  insulin lispro (ADMELOG) corrective regimen sliding scale   SubCutaneous at bedtime  lactobacillus acidophilus 1 Tablet(s) Oral daily  multivitamin 1 Tablet(s) Oral daily  tiotropium 2.5 MICROgram(s) Inhaler 2 Puff(s) Inhalation daily    MEDICATIONS  (PRN):  albuterol    90 MICROgram(s) HFA Inhaler 2 Puff(s) Inhalation every 6 hours PRN Bronchospasm  dextrose Oral Gel 15 Gram(s) Oral once PRN Blood Glucose LESS THAN 70 milliGRAM(s)/deciliter      Allergies    penicillin (Unknown)  pantoprazole (Unknown)    Intolerances        PHYSICAL EXAM:  Vital Signs Last 24 Hrs  T(F): 97.5 (04-18-23 @ 08:37)  HR: 71 (04-18-23 @ 08:37)  BP: 151/69 (04-18-23 @ 08:37)  RR: 18 (04-18-23 @ 08:37)    GENERAL: NAD, well-groomed, well-developed  HEAD:  Atraumatic, Normocephalic  Neuro:  Awake, alert, no aphasia  CN: PERRL, EOMI, no nystagmus, no facial weakness, tongue protrudes in the midline  motor: normal tone, no pronator drift, full strength in all four extremities  sensory: intact to light touch  coordination: finger to nose intact bilaterally  gait: not tested    LABS:                        15.3   8.35  )-----------( 137      ( 17 Apr 2023 08:32 )             46.9     04-17    138  |  111<H>  |  22  ----------------------------<  158<H>  3.9   |  23  |  0.98    Ca    9.0      17 Apr 2023 08:32      PT/INR - ( 18 Apr 2023 09:29 )   PT: 23.4 sec;   INR: 2.00 ratio               RADIOLOGY & ADDITIONAL STUDIES:  CT head 4/15/23:  No acute hemorrhage mass or mass effect.    MRI head and MRA head 4/17/23:  MRI BRAIN:  No hydrocephalus, mass effect, acute intracranial hemorrhage, vasogenic   edema, or acute territorial infarct.    MRA BRAIN:  No flow-limiting stenosis or vascular aneurysm.              
HPI: 89 y/o M w/ PMH of chronic a-fib, CAD, pre-DM, dyslipidemia, DVT on coumadin, factor V leiden, cardiomyopathy, ADRY, p/w unsteady gait. Patient states he woke up on wednesday at 8am, and when he tried to get up from bed, he felt LH. States that when he started walking he felt unsteady in his gait. At baseline, patient uses a cane to walk. However, patient had difficulty walking from room to room, or even walking to the bathroom due to unsteady gait. States that when he felt this sensation, he just wanted to be off his feet. Denies syncope. Patient states Dr. Javed had also previously mentioned that he had pauses on EKG. Denies cough, runny nose, sore throat, nausea, vomiting, abdmoinal pain     Subjective: Patient seen at bedside this AM, has no complaints at this time.    REVIEW OF SYSTEMS:    CONSTITUTIONAL: No weakness, fevers or chills  EYES/ENT: No visual changes;  No vertigo or throat pain   NECK: No pain or stiffness  RESPIRATORY: No cough, wheezing, hemoptysis; No shortness of breath  CARDIOVASCULAR: No chest pain or palpitations  GASTROINTESTINAL: No abdominal or epigastric pain. No nausea, vomiting, or hematemesis; No diarrhea or constipation. No melena or hematochezia.  GENITOURINARY: No dysuria, frequency or hematuria  NEUROLOGICAL: No numbness or weakness  SKIN: No itching, rashes    Vital Signs Last 24 Hrs  T(C): 36.6 (15 Apr 2023 23:15), Max: 36.6 (15 Apr 2023 23:15)  T(F): 97.9 (15 Apr 2023 23:15), Max: 97.9 (15 Apr 2023 23:15)  HR: 66 (2023 09:48) (66 - 94)  BP: 128/68 (2023 00:23) (126/93 - 163/87)  RR: 18 (15 Apr 2023 23:15) (18 - 18)  SpO2: 94% (2023 09:48) (94% - 96%)    Parameters below as of 2023 09:48  Patient On (Oxygen Delivery Method): room air              PHYSICAL EXAM:  GENERAL: NAD, lying in bed comfortably  HEAD:  Atraumatic, Normocephalic  EYES: EOMI, PERRLA, no nystagmus, conjunctiva and sclera clear  ENT: Moist mucous membranes  NECK: Supple, No JVD  CHEST/LUNG: Clear to auscultation bilaterally; No rales, rhonchi, wheezing. Unlabored respirations  HEART: Regular rate and rhythm; No murmurs  ABDOMEN: Bowel sounds present; Soft, Nontender, Nondistended.   EXTREMITIES:  2+ Peripheral Pulses, brisk capillary refill. No clubbing, cyanosis, or edema  NERVOUS SYSTEM:  Alert & Oriented X3, speech clear. No deficits   MSK: FROM all 4 extremities, full and equal strength    MEDICATIONS  (STANDING):  aspirin enteric coated 81 milliGRAM(s) Oral daily  atorvastatin 40 milliGRAM(s) Oral at bedtime  buDESOnide    Inhalation Suspension 0.5 milliGRAM(s) Inhalation daily  carvedilol 12.5 milliGRAM(s) Oral every 12 hours  dextrose 5%. 1000 milliLiter(s) (50 mL/Hr) IV Continuous <Continuous>  dextrose 5%. 1000 milliLiter(s) (100 mL/Hr) IV Continuous <Continuous>  dextrose 50% Injectable 25 Gram(s) IV Push once  dextrose 50% Injectable 25 Gram(s) IV Push once  dextrose 50% Injectable 12.5 Gram(s) IV Push once  digoxin     Tablet 125 MICROGram(s) Oral daily  glucagon  Injectable 1 milliGRAM(s) IntraMuscular once  insulin lispro (ADMELOG) corrective regimen sliding scale   SubCutaneous three times a day before meals  insulin lispro (ADMELOG) corrective regimen sliding scale   SubCutaneous at bedtime  lactobacillus acidophilus 1 Tablet(s) Oral daily  multivitamin 1 Tablet(s) Oral daily  tiotropium 2.5 MICROgram(s) Inhaler 2 Puff(s) Inhalation daily  warfarin 1 milliGRAM(s) Oral once    MEDICATIONS  (PRN):  albuterol    90 MICROgram(s) HFA Inhaler 2 Puff(s) Inhalation every 6 hours PRN Bronchospasm  dextrose Oral Gel 15 Gram(s) Oral once PRN Blood Glucose LESS THAN 70 milliGRAM(s)/deciliter      LABS:                          14.0   7.56  )-----------( 142      ( 2023 08:21 )             44.3     2023 08:21    139    |  110    |  25     ----------------------------<  141    4.6     |  26     |  1.03     Ca    9.0        2023 08:21  Mg     2.7       15 Apr 2023 15:24    TPro  7.9    /  Alb  4.0    /  TBili  0.9    /  DBili  x      /  AST  29     /  ALT  44     /  AlkPhos  50     15 Apr 2023 15:24    LIVER FUNCTIONS - ( 15 Apr 2023 15:24 )  Alb: 4.0 g/dL / Pro: 7.9 gm/dL / ALK PHOS: 50 U/L / ALT: 44 U/L / AST: 29 U/L / GGT: x           PT/INR - ( 2023 08:21 )   PT: 21.8 sec;   INR: 1.87 ratio         PTT - ( 2023 08:21 )  PTT:36.8 sec  CAPILLARY BLOOD GLUCOSE            Urinalysis Basic - ( 15 Apr 2023 16:06 )    Color: Yellow / Appearance: Clear / S.025 / pH: x  Gluc: x / Ketone: Trace  / Bili: Negative / Urobili: Negative   Blood: x / Protein: 30 mg/dL / Nitrite: Negative   Leuk Esterase: Trace / RBC: 3-5 /HPF / WBC 3-5 /HPF   Sq Epi: x / Non Sq Epi: x / Bacteria: Occasional        RADIOLOGY:      < from: CT Head No Cont (04.15.23 @ 17:15) >  IMPRESSION: No acute hemorrhage mass or mass effect.        
Interval History:  23: No new complaints today.    MEDICATIONS  (STANDING):  aspirin enteric coated 81 milliGRAM(s) Oral daily  atorvastatin 40 milliGRAM(s) Oral at bedtime  buDESOnide    Inhalation Suspension 0.5 milliGRAM(s) Inhalation daily  carvedilol 12.5 milliGRAM(s) Oral every 12 hours  dextrose 5%. 1000 milliLiter(s) (50 mL/Hr) IV Continuous <Continuous>  dextrose 5%. 1000 milliLiter(s) (100 mL/Hr) IV Continuous <Continuous>  dextrose 50% Injectable 12.5 Gram(s) IV Push once  dextrose 50% Injectable 25 Gram(s) IV Push once  dextrose 50% Injectable 25 Gram(s) IV Push once  digoxin     Tablet 125 MICROGram(s) Oral daily  glucagon  Injectable 1 milliGRAM(s) IntraMuscular once  insulin lispro (ADMELOG) corrective regimen sliding scale   SubCutaneous three times a day before meals  insulin lispro (ADMELOG) corrective regimen sliding scale   SubCutaneous at bedtime  lactobacillus acidophilus 1 Tablet(s) Oral daily  multivitamin 1 Tablet(s) Oral daily  tiotropium 2.5 MICROgram(s) Inhaler 2 Puff(s) Inhalation daily    MEDICATIONS  (PRN):  albuterol    90 MICROgram(s) HFA Inhaler 2 Puff(s) Inhalation every 6 hours PRN Bronchospasm  dextrose Oral Gel 15 Gram(s) Oral once PRN Blood Glucose LESS THAN 70 milliGRAM(s)/deciliter      Allergies    penicillin (Unknown)  pantoprazole (Unknown)    Intolerances        PHYSICAL EXAM:  Vital Signs Last 24 Hrs  T(F): 97.5 (23 @ 08:30)  HR: 93 (23 @ 08:30)  BP: 127/68 (23 @ 08:30)  RR: 18 (23 @ 08:30)    GENERAL: NAD, well-groomed, well-developed  HEAD:  Atraumatic, Normocephalic  Neuro:  Awake, alert, no aphasia  CN: PERRL, EOMI, no nystagmus, no facial weakness, tongue protrudes in the midline  motor: normal tone, no pronator drift, full strength in all four extremities  sensory: intact to light touch  coordination: finger to nose intact bilaterally  DTRs: symmetric, plantar responses flexor bilaterally    LABS:                        15.3   8.35  )-----------( 137      ( 2023 08:32 )             46.9     04-17    138  |  111<H>  |  22  ----------------------------<  158<H>  3.9   |  23  |  0.98    Ca    9.0      2023 08:32  Mg     2.7     04-15    TPro  7.9  /  Alb  4.0  /  TBili  0.9  /  DBili  x   /  AST  29  /  ALT  44  /  AlkPhos  50  04-15    PT/INR - ( 2023 08:32 )   PT: 21.9 sec;   INR: 1.88 ratio         PTT - ( 2023 08:21 )  PTT:36.8 sec  Urinalysis Basic - ( 15 Apr 2023 16:06 )    Color: Yellow / Appearance: Clear / S.025 / pH: x  Gluc: x / Ketone: Trace  / Bili: Negative / Urobili: Negative   Blood: x / Protein: 30 mg/dL / Nitrite: Negative   Leuk Esterase: Trace / RBC: 3-5 /HPF / WBC 3-5 /HPF   Sq Epi: x / Non Sq Epi: x / Bacteria: Occasional        RADIOLOGY & ADDITIONAL STUDIES:  CT head 4/15/23:  No acute hemorrhage mass or mass effect.          
    CHIEF COMPLAINT: dizziness for last couple of days     HPI:  87 y/o M w/ PMH of chronic a-fib, CAD  Moderate LAD disease  in 2020 ,, pre-DM, dyslipidemia, DVT on coumadin, factor V leiden, cardiomyopathy, ADRY, p/w unsteady gait. Patient states he woke up on wednesday at 8am, and when he tried to get up from bed he felt very lightheaded then unsteady on his feet while walking , felt better when he lays back on bed ,  At baseline, patient uses a cane to walk since his right hip surgery . However, patient had difficulty walking from room to room, or even walking to the bathroom due to unsteady gait. States that when he felt this sensation, he just wanted to be off his feet. Denies syncope. Patient states Dr. Javed had also previously mentioned that he had pauses on EKG. Denies cough, runny nose, sore throat, nausea, vomiting, abdominal pain     Patient symptoms worse with standing , better with supine position , does not drink enough liquids ,  patient denies any palpitation , shortness of breath or chest pain   monitor afib with controlled rate       4/17/23: no cehst pain/SOB/palpitations; Tele: Afib 60-80, short pause HR 38-40    MEDICATIONS:Home Medications:  Aspirin Enteric Coated 81 mg oral delayed release tablet: 1 tab(s) orally once a day (15 Apr 2023 19:40)  budesonide 0.5 mg/2 mL inhalation suspension: 2 milliliter(s) by nebulizer once a day (15 Apr 2023 19:44)  carvedilol 12.5 mg oral tablet: 1 tab(s) orally 2 times a day (15 Apr 2023 19:40)  digoxin 125 mcg (0.125 mg) oral tablet: 1 tab(s) orally once a day (15 Apr 2023 19:40)  ipratropium-albuterol 0.5 mg-2.5 mg/3 mL inhalation solution: 3 milliliter(s) by nebulizer once a day (15 Apr 2023 19:44)  Multiple Vitamins oral tablet: 1 tab(s) orally once a day (15 Apr 2023 19:40)  Probiotic Formula oral capsule: 1 cap(s) orally once a day (15 Apr 2023 19:40)  rosuvastatin 10 mg oral tablet: 1 tab(s) orally once a day (15 Apr 2023 19:40)  Super B Complex oral tablet: 1 tab(s) orally once a day (15 Apr 2023 19:40)  warfarin 1 mg oral tablet: 1 tab(s) orally once a day (15 Apr 2023 19:44)    MEDICATIONS  (STANDING):  aspirin enteric coated 81 milliGRAM(s) Oral daily  atorvastatin 40 milliGRAM(s) Oral at bedtime  buDESOnide    Inhalation Suspension 0.5 milliGRAM(s) Inhalation daily  carvedilol 12.5 milliGRAM(s) Oral every 12 hours  dextrose 5%. 1000 milliLiter(s) (50 mL/Hr) IV Continuous <Continuous>  dextrose 5%. 1000 milliLiter(s) (100 mL/Hr) IV Continuous <Continuous>  dextrose 50% Injectable 12.5 Gram(s) IV Push once  dextrose 50% Injectable 25 Gram(s) IV Push once  dextrose 50% Injectable 25 Gram(s) IV Push once  digoxin     Tablet 125 MICROGram(s) Oral daily  glucagon  Injectable 1 milliGRAM(s) IntraMuscular once  insulin lispro (ADMELOG) corrective regimen sliding scale   SubCutaneous three times a day before meals  insulin lispro (ADMELOG) corrective regimen sliding scale   SubCutaneous at bedtime  lactobacillus acidophilus 1 Tablet(s) Oral daily  multivitamin 1 Tablet(s) Oral daily  tiotropium 2.5 MICROgram(s) Inhaler 2 Puff(s) Inhalation daily    MEDICATIONS  (PRN):  albuterol    90 MICROgram(s) HFA Inhaler 2 Puff(s) Inhalation every 6 hours PRN Bronchospasm      Vital Signs Last 24 Hrs  T(C): 36.4 (17 Apr 2023 08:30), Max: 36.7 (16 Apr 2023 21:14)  T(F): 97.5 (17 Apr 2023 08:30), Max: 98 (16 Apr 2023 21:14)  HR: 93 (17 Apr 2023 08:30) (93 - 106)  BP: 127/68 (17 Apr 2023 08:30) (127/68 - 129/74)  BP(mean): --  RR: 18 (17 Apr 2023 08:30) (18 - 18)  SpO2: 93% (17 Apr 2023 08:30) (93% - 95%)    Parameters below as of 17 Apr 2023 08:30  Patient On (Oxygen Delivery Method): room air    Orthostatic VS    04-16-23 @ 15:39  Lying BP: Orthostatic BP (Lying Systolic): 112/Orthostatic BP (Lying Diastolic (mm Hg)): 78 HR: Orthostatic Pulse (Heart Rate (beats/min)): 70   Sitting BP: Orthostatic BP (Sitting Systolic): 114/Orthostatic BP (Sitting Diastolic (mm Hg)): 61 HR: Orthostatic Pulse (Heart Rate (beats/min)): 89  Standing BP: Orthostatic BP (Standing Systolic): 109/Orthostatic BP (Standing Diastolic (mm Hg)): 71 HR: Orthostatic Pulse (Heart Rate (beats/min)): 83  Site: --   Mode: --    PHYSICAL EXAM:    Constitutional: NAD, awake and alert, well-developed  HEENT: PERR, EOMI,  No oral cyananosis.  Neck:  supple,  No JVD  Respiratory: Breath sounds are clear bilaterally, No wheezing, rales or rhonchi  Cardiovascular: S1 and S2, IR IR ESM   Gastrointestinal: Bowel Sounds present, soft, nontender.   Extremities: No peripheral edema. No clubbing or cyanosis.  Vascular: 2+ peripheral pulses  Neurological: A/O x 3, unsteady gait   Musculoskeletal: no calf tenderness.  Skin: No rashes.      LABS: All Labs Reviewed:                         15.3   8.35  )-----------( 137      ( 17 Apr 2023 08:32 )             46.9     04-17    138  |  111<H>  |  22  ----------------------------<  158<H>  3.9   |  23  |  0.98    Ca    9.0      17 Apr 2023 08:32  Mg     2.7     04-15    TPro  7.9  /  Alb  4.0  /  TBili  0.9  /  DBili  x   /  AST  29  /  ALT  44  /  AlkPhos  50  04-15    - TroponinI hsT: <-12.74, <-13.32, <-12.96                        14.0   7.56  )-----------( 142      ( 16 Apr 2023 08:21 )             44.3                         14.6   7.41  )-----------( 152      ( 15 Apr 2023 15:24 )             45.3     16 Apr 2023 08:21    139    |  110    |  25     ----------------------------<  141    4.6     |  26     |  1.03   15 Apr 2023 15:24    136    |  108    |  25     ----------------------------<  123    5.4     |  26     |  1.25     Ca    9.0        16 Apr 2023 08:21  Ca    9.8        15 Apr 2023 15:24  Mg     2.7       15 Apr 2023 15:24    TPro  7.9    /  Alb  4.0    /  TBili  0.9    /  DBili  x      /  AST  29     /  ALT  44     /  AlkPhos  50     15 Apr 2023 15:24    PT/INR - ( 16 Apr 2023 08:21 )   PT: 21.8 sec;   INR: 1.87 ratio         PTT - ( 16 Apr 2023 08:21 )  PTT:36.8 sec        Blood Culture:       - TroponinI hsT: <-12.74, <-13.32, <-12.96    RADIOLOGY/EKG: reviewed    < from: 12 Lead ECG (04.15.23 @ 14:38) >  Atrial fibrillation  Abnormal ECG  When compared with ECG of 10-JUL-2022 13:25,  No significant change was found  Confirmed by WAI HAMILTON ([id number]) on 4/16/2023 9:39:38 AM    < end of copied text >    < from: CT Head No Cont (04.15.23 @ 17:15) >  IMPRESSION: No acute hemorrhage mass or mass effect.    < end of copied text >  < from: TTE Echo Complete w/o Contrast w/ Doppler (07.12.22 @ 09:36) >   Mild mitral annular calcification is present.   The mitral valve leaflets appear thickened.   Mild (1+) mitral regurgitation is present.   The aortic root is at the upper limits of normal in size.   The ascending aorta appears yousuf minimally dilated.   The aortic valve appears moderately calcified. Valve opening seems to be   restricted.   Peak and mean transaortic gradients are 25 and 16 mmHg respectively; this   finding is consistent with mild aortic stenosis.   Mild (1+) aortic regurgitation is present.   The tricuspid valve leaflets are thin and pliable; valve motion is   normal.   Mild (1+) tricuspid valve regurgitation is present.   Mild pulmonary hypertension.   Mild concentric left ventricular hypertrophy is present.   Left ventricle systolic function appears to be a t the lower limits of   normal in the presence of a cardiac arrhythmia. Visual estimation of left   ventricle ejection fraction is 50 %.    < end of copied text >  
    CHIEF COMPLAINT: dizziness for last couple of days     HPI:  89 y/o M w/ PMH of chronic a-fib, CAD  Moderate LAD disease  in 2020 ,, pre-DM, dyslipidemia, DVT on coumadin, factor V leiden, cardiomyopathy, ADRY, p/w unsteady gait. Patient states he woke up on wednesday at 8am, and when he tried to get up from bed he felt very lightheaded then unsteady on his feet while walking , felt better when he lays back on bed ,  At baseline, patient uses a cane to walk since his right hip surgery . However, patient had difficulty walking from room to room, or even walking to the bathroom due to unsteady gait. States that when he felt this sensation, he just wanted to be off his feet. Denies syncope. Patient states Dr. Javed had also previously mentioned that he had pauses on EKG. Denies cough, runny nose, sore throat, nausea, vomiting, abdominal pain     Patient symptoms worse with standing , better with supine position , does not drink enough liquids ,  patient denies any palpitation , shortness of breath or chest pain   monitor afib with controlled rate       4/17/23: no cehst pain/SOB/palpitations; Tele: Afib 60-80, short pause HR 38-40  4/18/23: Pt went for echo this am. Denies cp or sob. +unsteady gait.  Denies lightheadedness.  Tele: afib 70's    MEDICATIONS:Home Medications:  Aspirin Enteric Coated 81 mg oral delayed release tablet: 1 tab(s) orally once a day (15 Apr 2023 19:40)  budesonide 0.5 mg/2 mL inhalation suspension: 2 milliliter(s) by nebulizer once a day (15 Apr 2023 19:44)  carvedilol 12.5 mg oral tablet: 1 tab(s) orally 2 times a day (15 Apr 2023 19:40)  digoxin 125 mcg (0.125 mg) oral tablet: 1 tab(s) orally once a day (15 Apr 2023 19:40)  ipratropium-albuterol 0.5 mg-2.5 mg/3 mL inhalation solution: 3 milliliter(s) by nebulizer once a day (15 Apr 2023 19:44)  Multiple Vitamins oral tablet: 1 tab(s) orally once a day (15 Apr 2023 19:40)  Probiotic Formula oral capsule: 1 cap(s) orally once a day (15 Apr 2023 19:40)  rosuvastatin 10 mg oral tablet: 1 tab(s) orally once a day (15 Apr 2023 19:40)  Super B Complex oral tablet: 1 tab(s) orally once a day (15 Apr 2023 19:40)  warfarin 1 mg oral tablet: 1 tab(s) orally once a day (15 Apr 2023 19:44)    MEDICATIONS  (STANDING):  aspirin enteric coated 81 milliGRAM(s) Oral daily  atorvastatin 40 milliGRAM(s) Oral at bedtime  buDESOnide    Inhalation Suspension 0.5 milliGRAM(s) Inhalation daily  carvedilol 12.5 milliGRAM(s) Oral every 12 hours  dextrose 5%. 1000 milliLiter(s) (50 mL/Hr) IV Continuous <Continuous>  dextrose 5%. 1000 milliLiter(s) (100 mL/Hr) IV Continuous <Continuous>  dextrose 50% Injectable 25 Gram(s) IV Push once  dextrose 50% Injectable 25 Gram(s) IV Push once  dextrose 50% Injectable 12.5 Gram(s) IV Push once  digoxin     Tablet 125 MICROGram(s) Oral daily  glucagon  Injectable 1 milliGRAM(s) IntraMuscular once  insulin lispro (ADMELOG) corrective regimen sliding scale   SubCutaneous three times a day before meals  insulin lispro (ADMELOG) corrective regimen sliding scale   SubCutaneous at bedtime  lactobacillus acidophilus 1 Tablet(s) Oral daily  multivitamin 1 Tablet(s) Oral daily  tiotropium 2.5 MICROgram(s) Inhaler 2 Puff(s) Inhalation daily    MEDICATIONS  (PRN):  albuterol    90 MICROgram(s) HFA Inhaler 2 Puff(s) Inhalation every 6 hours PRN Bronchospasm  dextrose Oral Gel 15 Gram(s) Oral once PRN Blood Glucose LESS THAN 70 milliGRAM(s)/deciliter    Vital Signs Last 24 Hrs  T(C): 36.4 (04-18-23 @ 08:37), Max: 36.7 (04-17-23 @ 20:58)  T(F): 97.5 (04-18-23 @ 08:37), Max: 98 (04-17-23 @ 20:58)  HR: 71 (04-18-23 @ 08:37) (60 - 77)  BP: 151/69 (04-18-23 @ 08:37) (119/76 - 151/69)  BP(mean): --  RR: 18 (04-18-23 @ 08:37) (18 - 18)  SpO2: 96% (04-18-23 @ 08:37) (92% - 96%)    Orthostatic VS  04-18-23 @ 09:46  Lying BP: 123/102 HR: 72  Sitting BP: 126/86 HR: 100  Standing BP: 123/86 HR: 103  Site: upper right arm  Mode: electronic    PHYSICAL EXAM:    Constitutional: NAD, awake and alert, well-developed  HEENT: PERR, EOMI,  No oral cyananosis.  Neck:  supple,  No JVD  Respiratory: Breath sounds are clear bilaterally, No wheezing, rales or rhonchi  Cardiovascular: S1 and S2, IR IR ESM   Gastrointestinal: Bowel Sounds present, soft, nontender.   Extremities: No peripheral edema. No clubbing or cyanosis. Pt wearing compression stockings   Vascular: 2+ peripheral pulses  Neurological: A/O x 3, unsteady gait   Musculoskeletal: no calf tenderness.  Skin: No rashes.      LABS: All Labs Reviewed:                                                15.3   8.35  )-----------( 137      ( 17 Apr 2023 08:32 )             46.9         04-17    138  |  111<H>  |  22  ----------------------------<  158<H>  3.9   |  23  |  0.98    Ca    9.0      17 Apr 2023 08:32  Mg     2.7     04-15    TPro  7.9  /  Alb  4.0  /  TBili  0.9  /  DBili  x   /  AST  29  /  ALT  44  /  AlkPhos  50  04-15    - TroponinI hsT: <-12.74, <-13.32, <-12.96                        14.0   7.56  )-----------( 142      ( 16 Apr 2023 08:21 )             44.3                         14.6   7.41  )-----------( 152      ( 15 Apr 2023 15:24 )             45.3     16 Apr 2023 08:21    139    |  110    |  25     ----------------------------<  141    4.6     |  26     |  1.03   15 Apr 2023 15:24    136    |  108    |  25     ----------------------------<  123    5.4     |  26     |  1.25     Ca    9.0        16 Apr 2023 08:21  Ca    9.8        15 Apr 2023 15:24  Mg     2.7       15 Apr 2023 15:24    TPro  7.9    /  Alb  4.0    /  TBili  0.9    /  DBili  x      /  AST  29     /  ALT  44     /  AlkPhos  50     15 Apr 2023 15:24    PT/INR - ( 16 Apr 2023 08:21 )   PT: 21.8 sec;   INR: 1.87 ratio         PTT - ( 16 Apr 2023 08:21 )  PTT:36.8 sec    Digoxin Level, Serum: .51 ng/mL    Blood Culture:       - TroponinI hsT: <-12.74, <-13.32, <-12.96    RADIOLOGY/EKG: reviewed    < from: 12 Lead ECG (04.15.23 @ 14:38) >  Atrial fibrillation  Abnormal ECG  When compared with ECG of 10-JUL-2022 13:25,  No significant change was found  Confirmed by WAI HAMILTON ([id number]) on 4/16/2023 9:39:38 AM    < end of copied text >    < from: CT Head No Cont (04.15.23 @ 17:15) >  IMPRESSION: No acute hemorrhage mass or mass effect.    < end of copied text >  < from: TTE Echo Complete w/o Contrast w/ Doppler (07.12.22 @ 09:36) >   Mild mitral annular calcification is present.   The mitral valve leaflets appear thickened.   Mild (1+) mitral regurgitation is present.   The aortic root is at the upper limits of normal in size.   The ascending aorta appears yousuf minimally dilated.   The aortic valve appears moderately calcified. Valve opening seems to be   restricted.   Peak and mean transaortic gradients are 25 and 16 mmHg respectively; this   finding is consistent with mild aortic stenosis.   Mild (1+) aortic regurgitation is present.   The tricuspid valve leaflets are thin and pliable; valve motion is   normal.   Mild (1+) tricuspid valve regurgitation is present.   Mild pulmonary hypertension.   Mild concentric left ventricular hypertrophy is present.   Left ventricle systolic function appears to be a t the lower limits of   normal in the presence of a cardiac arrhythmia. Visual estimation of left   ventricle ejection fraction is 50 %.    < end of copied text >      < from: MR Head No Cont (04.17.23 @ 17:30) >  No hydrocephalus, mass effect, acute intracranial hemorrhage, vasogenic   edema, or acute territorial infarct.    < end of copied text >

## 2023-04-18 NOTE — PROGRESS NOTE ADULT - PROBLEM SELECTOR PLAN 1
Problem: Dizziness.   ·  Recommendation: unsteady gait worse with standing   no evidence of immediate orthostatic hypotension, no events noted on tele.  Afib rate controlled.  ? vestibular origin  less likely cardiac origin , Maintain adequate hydration.  Continue compression stockings.  Echo performed, results pending.
Problem: Dizziness.   ·  Recommendation: unsteady gait  worse with standing   no evidence of immediate orthostatic hypotension ?  controlled atrial fibrillation  heart rate , vestibular origin  less likely cardiac origin , neurologic work up , repeat orthostatic BP , encourage patient to increase fluid intake ,

## 2023-04-18 NOTE — DISCHARGE NOTE PROVIDER - NSDCMRMEDTOKEN_GEN_ALL_CORE_FT
albuterol 90 mcg/inh inhalation aerosol: 2 puff(s) inhaled every 6 hours As needed Bronchospasm  Aspirin Enteric Coated 81 mg oral delayed release tablet: 1 tab(s) orally once a day  budesonide 0.5 mg/2 mL inhalation suspension: 2 milliliter(s) by nebulizer once a day  carvedilol 12.5 mg oral tablet: 1 tab(s) orally 2 times a day  digoxin 125 mcg (0.125 mg) oral tablet: 1 tab(s) orally once a day  ipratropium-albuterol 0.5 mg-2.5 mg/3 mL inhalation solution: 3 milliliter(s) by nebulizer once a day  Multiple Vitamins oral tablet: 1 tab(s) orally once a day  Probiotic Formula oral capsule: 1 cap(s) orally once a day  rosuvastatin 10 mg oral tablet: 1 tab(s) orally once a day  Super B Complex oral tablet: 1 tab(s) orally once a day  tiotropium 2.5 mcg/inh inhalation aerosol: 2 puff(s) inhaled once a day  warfarin 1 mg oral tablet: 1 tab(s) orally once a day

## 2023-04-18 NOTE — DISCHARGE NOTE PROVIDER - NSDCCPCAREPLAN_GEN_ALL_CORE_FT
PRINCIPAL DISCHARGE DIAGNOSIS  Diagnosis: Unsteady gait when walking  Assessment and Plan of Treatment: - resolved  - outpatient vestibular PT  - care discussed with Neurology /  Cardiology

## 2023-04-18 NOTE — PROGRESS NOTE ADULT - ASSESSMENT
87 y/o man w/ PMH of chronic a-fib, CAD, pre-DM, dyslipidemia, DVT on coumadin, factor V leiden, cardiomyopathy, ADRY, p/w unsteady gait, non focal exam. head CT unchanged, no acute changes,   -MRI brain pending to evaluate for possible stroke  -? NPH. Ventricles do not appear disproportionate to sulcal size on CT head  -f/u vitamin B12 level  -Physical therapy    Will f/u as needed
89 y/o M w/ PMH of chronic a-fib, CAD, pre-DM, dyslipidemia, DVT on coumadin, factor V leiden, cardiomyopathy, ADRY, p/w unsteady gait    *Lightheadedness / unsteady gait - R/o TIA vs CVA   -orthostatic hypotension: positive from lying to sitting today, encourage po intake, compression stockings  -CTH: Dilated ventricles out of portion sulci seen which could be compatible with NPH in the correct clinical setting  -Troponin negative x 2   -Continue Tele monitoring  -TTE: 7/2022: LVEF: 50%, mild AS  -EKG: A-fib   -ASA / Statin  -HbA1c: 7.1%, vitamin B12 normal   -Lipid panel: within normal limits   -PT consult  -Check digoxin level: 0.51  -Speech and swallow consult appreciated   -Orthostatic vitals noted:  > 123 from sitting to standing, pt was asymptomatic as per RN  -F/u MRI, TTE  -Neuro checks  -Cardio consult appreciated   -Neuro consult appreciated     *H/o chronic a-fib/CAD/ dyslipidemia / DVT / factor V Leiden / cardiomyopathy / ADRY  -C/w home meds and f/u outpatient for further management if conditions remain stable during hospitalization     *Diabetes mellitus  -Hypoglycemia Protocol, ISS, Accuchecks AC&HS  -Diet: Consistent Carbs w/ Evening Snack  -HbA1c: 7.1%    *DVT ppx   -On Coumadin     DISPO: f/u MRI, MRA head and neck, TTE 
87 y/o M w/ PMH of chronic a-fib, CAD, pre-DM, dyslipidemia, DVT on coumadin, factor V leiden, cardiomyopathy, ADRY, p/w unsteady gait    *Lightheadedness / unsteady gait - R/o TIA vs CVA vs orthostatic hypotension vs r/o arrhythmia vs NPH   -CTH: Dilated ventricles out of portion sulci seen which could be compatible with NPH in the correct clinical setting  -Troponin negative x 2   -Continue Tele monitoring  -TTE: 7/2022: LVEF: 50%, mild AS  -EKG: A-fib   -ASA / Statin  -HbA1c: 7.1%  -Lipid panel: within normal limits   -PT consult  -Check digoxin level: 0.51  -Speech and swallow consult appreciated   -Orthostatic vitals noted:  > 123 from sitting to standing, pt was asymptomatic as per RN  -F/u MRI, TTE  -Neuro checks  -Cardio consult appreciated   -Neuro consult appreciated     *Mild hyperkalemia   -Improved     *H/o chronic a-fib/CAD/ dyslipidemia / DVT / factor V Leiden / cardiomyopathy / ADRY  -C/w home meds and f/u outpatient for further management if conditions remain stable during hospitalization     *Diabetes mellitus  -Hypoglycemia Protocol, ISS, Accuchecks AC&HS  -Diet: Consistent Carbs w/ Evening Snack  -HbA1c: 7.1%    *DVT ppx   -On Coumadin     DISPO: f/u MRI, MRA head and neck, TTE 
87 y/o man w/ PMH of chronic a-fib, CAD, pre-DM, dyslipidemia, DVT on coumadin, factor V leiden, cardiomyopathy, ADRY, p/w unsteady gait, non focal exam. head CT unchanged, no acute changes,   -MRI brain does not show evidence of stroke  -No suggestion of NPH  -Vitamin B12 level is normal  -Physical therapy    Will f/u if needed

## 2023-04-18 NOTE — PROGRESS NOTE ADULT - PROBLEM SELECTOR PLAN 3
·  Problem: CAD (coronary artery disease).   ·  Recommendation: Moderate LAD disease  on cath 2020   , no anginal symptoms  continue statin, aspirin
·  Problem: CAD (coronary artery disease).   ·  Recommendation: Moderate LAD disease  on cath 2020   , no anginal symptoms  continue statin ,

## 2023-04-18 NOTE — PROGRESS NOTE ADULT - PROBLEM SELECTOR PLAN 4
·  Problem: Valvular heart disease.   ·  Recommendation: patient had hypertensive hypertrophy  normal EF moderate AS ,  follow up echo ,
·  Problem: Valvular heart disease.   ·  Recommendation: patient had hypertensive hypertrophy  normal EF moderate AS ,  follow up echo performed, results pending

## 2023-04-18 NOTE — PROGRESS NOTE ADULT - PROBLEM SELECTOR PLAN 2
·  Problem: Chronic atrial fibrillation.   ·  Recommendation: controlled rate , continue anticoagulation, coreg , digoxin warfarin
·  Problem: Chronic atrial fibrillation.   ·  Recommendation: controlled rate , continue anticoagulation, coreg , digoxin warfarin, check digoxin level

## 2023-04-18 NOTE — PROGRESS NOTE ADULT - NS ATTEND AMEND GEN_ALL_CORE FT
agree w/ above. encourage po fluid intake , follow up echo , if normal EF , discharge planning , follow up as OP
agree w/ above.

## 2023-04-18 NOTE — DISCHARGE NOTE PROVIDER - NSDCFUSCHEDAPPT_GEN_ALL_CORE_FT
[facility]  CARDIOLOGY [address]  Scheduled Appointment: 04/26/2023

## 2023-04-19 ENCOUNTER — NON-APPOINTMENT (OUTPATIENT)
Age: 88
End: 2023-04-19

## 2023-04-24 DIAGNOSIS — E87.5 HYPERKALEMIA: ICD-10-CM

## 2023-04-24 DIAGNOSIS — I95.1 ORTHOSTATIC HYPOTENSION: ICD-10-CM

## 2023-04-24 DIAGNOSIS — R42 DIZZINESS AND GIDDINESS: ICD-10-CM

## 2023-04-24 DIAGNOSIS — I35.9 NONRHEUMATIC AORTIC VALVE DISORDER, UNSPECIFIED: ICD-10-CM

## 2023-04-24 DIAGNOSIS — Z79.01 LONG TERM (CURRENT) USE OF ANTICOAGULANTS: ICD-10-CM

## 2023-04-24 DIAGNOSIS — E78.5 HYPERLIPIDEMIA, UNSPECIFIED: ICD-10-CM

## 2023-04-24 DIAGNOSIS — I25.10 ATHEROSCLEROTIC HEART DISEASE OF NATIVE CORONARY ARTERY WITHOUT ANGINA PECTORIS: ICD-10-CM

## 2023-04-24 DIAGNOSIS — Z88.0 ALLERGY STATUS TO PENICILLIN: ICD-10-CM

## 2023-04-24 DIAGNOSIS — Z86.718 PERSONAL HISTORY OF OTHER VENOUS THROMBOSIS AND EMBOLISM: ICD-10-CM

## 2023-04-24 DIAGNOSIS — R73.03 PREDIABETES: ICD-10-CM

## 2023-04-24 DIAGNOSIS — R26.81 UNSTEADINESS ON FEET: ICD-10-CM

## 2023-04-24 DIAGNOSIS — I48.20 CHRONIC ATRIAL FIBRILLATION, UNSPECIFIED: ICD-10-CM

## 2023-04-24 DIAGNOSIS — G47.33 OBSTRUCTIVE SLEEP APNEA (ADULT) (PEDIATRIC): ICD-10-CM

## 2023-05-04 ENCOUNTER — APPOINTMENT (OUTPATIENT)
Dept: CARDIOLOGY | Facility: CLINIC | Age: 88
End: 2023-05-04
Payer: MEDICARE

## 2023-05-04 ENCOUNTER — LABORATORY RESULT (OUTPATIENT)
Age: 88
End: 2023-05-04

## 2023-05-04 ENCOUNTER — NON-APPOINTMENT (OUTPATIENT)
Age: 88
End: 2023-05-04

## 2023-05-04 VITALS
OXYGEN SATURATION: 98 % | HEIGHT: 73 IN | BODY MASS INDEX: 28.76 KG/M2 | DIASTOLIC BLOOD PRESSURE: 68 MMHG | SYSTOLIC BLOOD PRESSURE: 120 MMHG | HEART RATE: 86 BPM | WEIGHT: 217 LBS

## 2023-05-04 DIAGNOSIS — I42.9 CARDIOMYOPATHY, UNSPECIFIED: ICD-10-CM

## 2023-05-04 LAB
INR PPP: 2.2 RATIO
POCT-PROTHROMBIN TIME: 26.7 SECS
QUALITY CONTROL: YES

## 2023-05-04 PROCEDURE — 99214 OFFICE O/P EST MOD 30 MIN: CPT

## 2023-05-04 PROCEDURE — 93000 ELECTROCARDIOGRAM COMPLETE: CPT

## 2023-05-04 NOTE — PHYSICAL EXAM
[Normal S1, S2] : normal S1, S2 [Soft] : abdomen soft [Non Tender] : non-tender [No Edema] : no edema [Normal] : moves all extremities, no focal deficits, normal speech [Alert and Oriented] : alert and oriented [de-identified] : use of cane

## 2023-05-04 NOTE — DISCUSSION/SUMMARY
[EKG obtained to assist in diagnosis and management of assessed problem(s)] : EKG obtained to assist in diagnosis and management of assessed problem(s) [FreeTextEntry1] : Present today post hospital follow up admitted with cc of positional Lightheadedness/unsteady gait found to be orthostatics and diagnosed with  BPV which is possibly 2/2 dehydration admits he does not drink sufficient amounts of daily liquids.  Since increasing fluids post hospital discharge symptoms have fully resolved. \par Troponin negative, No events on telemetry \par Advised compression stockings \par Maintain adequate hydration \par Avoid abrupt change in position \par CW vestibular therapy \par \par LV dysfunction;  EF 45-50 % ( in comparison to Echo 3/2023  EF of 55-60% ) likely 2/2 cardiac arrhythmia; had CC 2020 Moderate LAD disease.  Patient asymptomatic from cardiac standpoint no exertional angina.  Will repeat Echocardiogram now reassess LV FX ( Consider stress test ) and severity of AS \par \par AF: Rate controlled INR 2.2 \par \par Plan DW patient/wife \par \par \par

## 2023-05-04 NOTE — HISTORY OF PRESENT ILLNESS
[FreeTextEntry1] :  89 y/o male PMH: chronic a-fib, CAD moderate LAD disease 2020, pre-DM, dyslipidemia, DVT on coumadin,\par factor V leiden, cardiomyopathy, ADRY, p/w unsteady gait. Patient states he woke in the morning and when he tried to get up from bed he felt LH and unsteady on his feet with walking,  at baseline patient uses a cane to ambulate since his hip surgery.  However, patient had difficulty walking from room to room, reports his symptoms were worse with standing , better with supine position , does not drink enough liquids. symptoms  likely 2/2 dehydration/orthostasis/vestibular,\par Denies CP/SOB/Palpitations/syncope, troponin negative, monitor in hospital AF rate controlled\par Neuro input reviewed; head CT no acute changes, MRI brain does not show evidence of stroke, Vitamin B12 level is normal, Physical/vestibular therapy \par \par Cardiac Cath 2/14/20;\par  Moderate LAD disease 60% stenosis unchanged from 2016, NL LV FX Mild AS ( RCA 30% stenosis ) \par \par Echo;\par 4/18/23 Moderate LVH, LV dysfunction in setting of cardiac arrhythmia EF 45-50 % (  in comparison to Echo 3/2023 EF of 55-60% ) moderate-severely dilated LA  The right atrium appears mildly dilated.\par The right ventricle is normal in size with decreased contractility. \par Aortic Valve appears moderately calcified. Valve opening seems to be restricted. Peak and mean transaortic gradients are 31 and 15 mmHg respectively; this finding is consistent with mild aortic stenosis.  WILBERT by continuity equation (.7 cm2) and dimensionless index (.21 ) \par suggest severe aortic stenosis.\par Trace MR/TR/MO\par An interatrial septal aneurysm is noted.\par \par \par

## 2023-05-05 ENCOUNTER — LABORATORY RESULT (OUTPATIENT)
Age: 88
End: 2023-05-05

## 2023-05-16 ENCOUNTER — APPOINTMENT (OUTPATIENT)
Dept: CARDIOLOGY | Facility: CLINIC | Age: 88
End: 2023-05-16
Payer: MEDICARE

## 2023-05-16 PROCEDURE — 93306 TTE W/DOPPLER COMPLETE: CPT

## 2023-06-16 ENCOUNTER — LABORATORY RESULT (OUTPATIENT)
Age: 88
End: 2023-06-16

## 2023-07-20 ENCOUNTER — LABORATORY RESULT (OUTPATIENT)
Age: 88
End: 2023-07-20

## 2023-08-18 ENCOUNTER — LABORATORY RESULT (OUTPATIENT)
Age: 88
End: 2023-08-18

## 2023-08-20 ENCOUNTER — INPATIENT (INPATIENT)
Facility: HOSPITAL | Age: 88
LOS: 2 days | Discharge: HOME HEALTH | DRG: 178 | End: 2023-08-23
Attending: HOSPITALIST | Admitting: INTERNAL MEDICINE
Payer: MEDICARE

## 2023-08-20 VITALS
HEIGHT: 74 IN | HEART RATE: 89 BPM | WEIGHT: 214.95 LBS | SYSTOLIC BLOOD PRESSURE: 133 MMHG | OXYGEN SATURATION: 96 % | RESPIRATION RATE: 22 BRPM | DIASTOLIC BLOOD PRESSURE: 95 MMHG | TEMPERATURE: 99 F

## 2023-08-20 DIAGNOSIS — R29.898 OTHER SYMPTOMS AND SIGNS INVOLVING THE MUSCULOSKELETAL SYSTEM: ICD-10-CM

## 2023-08-20 DIAGNOSIS — Z90.49 ACQUIRED ABSENCE OF OTHER SPECIFIED PARTS OF DIGESTIVE TRACT: Chronic | ICD-10-CM

## 2023-08-20 LAB
ALBUMIN SERPL ELPH-MCNC: 3.9 G/DL — SIGNIFICANT CHANGE UP (ref 3.3–5)
ALP SERPL-CCNC: 51 U/L — SIGNIFICANT CHANGE UP (ref 40–120)
ALT FLD-CCNC: 43 U/L — SIGNIFICANT CHANGE UP (ref 12–78)
ANION GAP SERPL CALC-SCNC: 7 MMOL/L — SIGNIFICANT CHANGE UP (ref 5–17)
APPEARANCE UR: CLEAR — SIGNIFICANT CHANGE UP
AST SERPL-CCNC: 35 U/L — SIGNIFICANT CHANGE UP (ref 15–37)
BACTERIA # UR AUTO: ABNORMAL
BASOPHILS # BLD AUTO: 0.02 K/UL — SIGNIFICANT CHANGE UP (ref 0–0.2)
BASOPHILS NFR BLD AUTO: 0.2 % — SIGNIFICANT CHANGE UP (ref 0–2)
BILIRUB SERPL-MCNC: 0.9 MG/DL — SIGNIFICANT CHANGE UP (ref 0.2–1.2)
BILIRUB UR-MCNC: NEGATIVE — SIGNIFICANT CHANGE UP
BUN SERPL-MCNC: 19 MG/DL — SIGNIFICANT CHANGE UP (ref 7–23)
CALCIUM SERPL-MCNC: 8.7 MG/DL — SIGNIFICANT CHANGE UP (ref 8.5–10.1)
CHLORIDE SERPL-SCNC: 104 MMOL/L — SIGNIFICANT CHANGE UP (ref 96–108)
CK SERPL-CCNC: 216 U/L — SIGNIFICANT CHANGE UP (ref 26–308)
CO2 SERPL-SCNC: 25 MMOL/L — SIGNIFICANT CHANGE UP (ref 22–31)
COLOR SPEC: YELLOW — SIGNIFICANT CHANGE UP
CREAT SERPL-MCNC: 1.26 MG/DL — SIGNIFICANT CHANGE UP (ref 0.5–1.3)
DIFF PNL FLD: ABNORMAL
EGFR: 55 ML/MIN/1.73M2 — LOW
EOSINOPHIL # BLD AUTO: 0.01 K/UL — SIGNIFICANT CHANGE UP (ref 0–0.5)
EOSINOPHIL NFR BLD AUTO: 0.1 % — SIGNIFICANT CHANGE UP (ref 0–6)
EPI CELLS # UR: SIGNIFICANT CHANGE UP
GLUCOSE SERPL-MCNC: 142 MG/DL — HIGH (ref 70–99)
GLUCOSE UR QL: NEGATIVE — SIGNIFICANT CHANGE UP
HCT VFR BLD CALC: 41.1 % — SIGNIFICANT CHANGE UP (ref 39–50)
HGB BLD-MCNC: 13.9 G/DL — SIGNIFICANT CHANGE UP (ref 13–17)
IMM GRANULOCYTES NFR BLD AUTO: 0.3 % — SIGNIFICANT CHANGE UP (ref 0–0.9)
KETONES UR-MCNC: ABNORMAL
LACTATE SERPL-SCNC: 1.9 MMOL/L — SIGNIFICANT CHANGE UP (ref 0.7–2)
LEUKOCYTE ESTERASE UR-ACNC: ABNORMAL
LYMPHOCYTES # BLD AUTO: 0.65 K/UL — LOW (ref 1–3.3)
LYMPHOCYTES # BLD AUTO: 7.5 % — LOW (ref 13–44)
MAGNESIUM SERPL-MCNC: 2.4 MG/DL — SIGNIFICANT CHANGE UP (ref 1.6–2.6)
MCHC RBC-ENTMCNC: 33.8 GM/DL — SIGNIFICANT CHANGE UP (ref 32–36)
MCHC RBC-ENTMCNC: 33.8 PG — SIGNIFICANT CHANGE UP (ref 27–34)
MCV RBC AUTO: 100 FL — SIGNIFICANT CHANGE UP (ref 80–100)
MONOCYTES # BLD AUTO: 1.01 K/UL — HIGH (ref 0–0.9)
MONOCYTES NFR BLD AUTO: 11.7 % — SIGNIFICANT CHANGE UP (ref 2–14)
NEUTROPHILS # BLD AUTO: 6.92 K/UL — SIGNIFICANT CHANGE UP (ref 1.8–7.4)
NEUTROPHILS NFR BLD AUTO: 80.2 % — HIGH (ref 43–77)
NITRITE UR-MCNC: NEGATIVE — SIGNIFICANT CHANGE UP
PH UR: 5 — SIGNIFICANT CHANGE UP (ref 5–8)
PLATELET # BLD AUTO: 71 K/UL — LOW (ref 150–400)
POTASSIUM SERPL-MCNC: 4.6 MMOL/L — SIGNIFICANT CHANGE UP (ref 3.5–5.3)
POTASSIUM SERPL-SCNC: 4.6 MMOL/L — SIGNIFICANT CHANGE UP (ref 3.5–5.3)
PROT SERPL-MCNC: 7.6 GM/DL — SIGNIFICANT CHANGE UP (ref 6–8.3)
PROT UR-MCNC: 30 MG/DL
RAPID RVP RESULT: DETECTED
RBC # BLD: 4.11 M/UL — LOW (ref 4.2–5.8)
RBC # FLD: 13.5 % — SIGNIFICANT CHANGE UP (ref 10.3–14.5)
RBC CASTS # UR COMP ASSIST: SIGNIFICANT CHANGE UP /HPF (ref 0–4)
SARS-COV-2 RNA SPEC QL NAA+PROBE: DETECTED
SODIUM SERPL-SCNC: 136 MMOL/L — SIGNIFICANT CHANGE UP (ref 135–145)
SP GR SPEC: 1.02 — SIGNIFICANT CHANGE UP (ref 1.01–1.02)
TROPONIN I, HIGH SENSITIVITY RESULT: 22.44 NG/L — SIGNIFICANT CHANGE UP
TSH SERPL-MCNC: 2.5 UU/ML — SIGNIFICANT CHANGE UP (ref 0.34–4.82)
UROBILINOGEN FLD QL: NEGATIVE — SIGNIFICANT CHANGE UP
WBC # BLD: 8.64 K/UL — SIGNIFICANT CHANGE UP (ref 3.8–10.5)
WBC # FLD AUTO: 8.64 K/UL — SIGNIFICANT CHANGE UP (ref 3.8–10.5)
WBC UR QL: SIGNIFICANT CHANGE UP /HPF (ref 0–5)

## 2023-08-20 PROCEDURE — 85610 PROTHROMBIN TIME: CPT

## 2023-08-20 PROCEDURE — 71045 X-RAY EXAM CHEST 1 VIEW: CPT | Mod: 26

## 2023-08-20 PROCEDURE — 80048 BASIC METABOLIC PNL TOTAL CA: CPT

## 2023-08-20 PROCEDURE — 87086 URINE CULTURE/COLONY COUNT: CPT

## 2023-08-20 PROCEDURE — 97110 THERAPEUTIC EXERCISES: CPT | Mod: GP

## 2023-08-20 PROCEDURE — 36415 COLL VENOUS BLD VENIPUNCTURE: CPT

## 2023-08-20 PROCEDURE — 94640 AIRWAY INHALATION TREATMENT: CPT

## 2023-08-20 PROCEDURE — 73562 X-RAY EXAM OF KNEE 3: CPT | Mod: 26,LT

## 2023-08-20 PROCEDURE — 85379 FIBRIN DEGRADATION QUANT: CPT

## 2023-08-20 PROCEDURE — 83036 HEMOGLOBIN GLYCOSYLATED A1C: CPT

## 2023-08-20 PROCEDURE — 99285 EMERGENCY DEPT VISIT HI MDM: CPT

## 2023-08-20 PROCEDURE — 93010 ELECTROCARDIOGRAM REPORT: CPT

## 2023-08-20 PROCEDURE — 85027 COMPLETE CBC AUTOMATED: CPT

## 2023-08-20 PROCEDURE — 85730 THROMBOPLASTIN TIME PARTIAL: CPT

## 2023-08-20 PROCEDURE — 93970 EXTREMITY STUDY: CPT | Mod: 26

## 2023-08-20 PROCEDURE — 70450 CT HEAD/BRAIN W/O DYE: CPT | Mod: 26,MA

## 2023-08-20 PROCEDURE — 97116 GAIT TRAINING THERAPY: CPT | Mod: GP

## 2023-08-20 PROCEDURE — 72170 X-RAY EXAM OF PELVIS: CPT | Mod: 26

## 2023-08-20 PROCEDURE — 87040 BLOOD CULTURE FOR BACTERIA: CPT

## 2023-08-20 PROCEDURE — 85025 COMPLETE CBC W/AUTO DIFF WBC: CPT

## 2023-08-20 PROCEDURE — 97163 PT EVAL HIGH COMPLEX 45 MIN: CPT | Mod: GP

## 2023-08-20 RX ORDER — SODIUM CHLORIDE 9 MG/ML
500 INJECTION INTRAMUSCULAR; INTRAVENOUS; SUBCUTANEOUS ONCE
Refills: 0 | Status: COMPLETED | OUTPATIENT
Start: 2023-08-20 | End: 2023-08-20

## 2023-08-20 RX ORDER — DIGOXIN 250 MCG
1 TABLET ORAL
Qty: 0 | Refills: 0 | DISCHARGE

## 2023-08-20 RX ORDER — ASPIRIN/CALCIUM CARB/MAGNESIUM 324 MG
1 TABLET ORAL
Qty: 0 | Refills: 0 | DISCHARGE

## 2023-08-20 RX ORDER — L.ACIDOPH/B.ANIMALIS/B.LONGUM 15B CELL
1 CAPSULE ORAL
Qty: 0 | Refills: 0 | DISCHARGE

## 2023-08-20 RX ORDER — ACETAMINOPHEN 500 MG
650 TABLET ORAL ONCE
Refills: 0 | Status: COMPLETED | OUTPATIENT
Start: 2023-08-20 | End: 2023-08-20

## 2023-08-20 RX ORDER — BUDESONIDE, MICRONIZED 100 %
2 POWDER (GRAM) MISCELLANEOUS
Refills: 0 | DISCHARGE

## 2023-08-20 RX ORDER — IPRATROPIUM/ALBUTEROL SULFATE 18-103MCG
3 AEROSOL WITH ADAPTER (GRAM) INHALATION
Refills: 0 | DISCHARGE

## 2023-08-20 RX ORDER — CARVEDILOL PHOSPHATE 80 MG/1
1 CAPSULE, EXTENDED RELEASE ORAL
Qty: 0 | Refills: 0 | DISCHARGE

## 2023-08-20 RX ORDER — ROSUVASTATIN CALCIUM 5 MG/1
1 TABLET ORAL
Qty: 0 | Refills: 0 | DISCHARGE

## 2023-08-20 RX ORDER — ACETAMINOPHEN 500 MG
650 TABLET ORAL EVERY 6 HOURS
Refills: 0 | Status: DISCONTINUED | OUTPATIENT
Start: 2023-08-20 | End: 2023-08-23

## 2023-08-20 RX ADMIN — SODIUM CHLORIDE 500 MILLILITER(S): 9 INJECTION INTRAMUSCULAR; INTRAVENOUS; SUBCUTANEOUS at 16:27

## 2023-08-20 RX ADMIN — Medication 650 MILLIGRAM(S): at 15:04

## 2023-08-20 NOTE — ED ADULT NURSE REASSESSMENT NOTE - NS ED NURSE REASSESS COMMENT FT1
Received pt from prior RN, pt is a&0x3, airway patent, does not show any s/s of respiratory distress, breathing is unlabored, color is culturally appropriate,  vs are WNL except for oral temp of 100.8 MD Castellanos cardiac monitor reads A fib, HR 80bpm

## 2023-08-20 NOTE — ED ADULT NURSE NOTE - NSFALLHARMRISKINTERV_ED_ALL_ED

## 2023-08-20 NOTE — ED PROVIDER NOTE - NS ED ROS FT
General: No fever, no nausea, no vomiting  HEENT: No loc, no ha, no dizziness  Respiratory: no trouble breathing  Cardiovascular: No chest pain  GI: No abdominal pain, no diarrhea  : No urinary complaints  Endocrine: no generalized weakness  Neurological: +b/l LE weakness  MSK: no recent trauma

## 2023-08-20 NOTE — ED PROVIDER NOTE - PROGRESS NOTE DETAILS
Spoke to ID on-call Dr. Branch regarding urine results for the patient.  Recommended to wait for urine culture before starting any antibiotics Patient was CT /Xray with no acute pathology.  Patient still not able to bear weight on the legs.  febrile to  100.8 , mildy tachycardiac, pending rvp.   will admit to medicine.   discussed case with Dr. Winter. hopsitalist

## 2023-08-20 NOTE — ED PROVIDER NOTE - PHYSICAL EXAMINATION
General: Patient in no acute distress, AAOX3.   HENMT: NC/AT, no nasal congestion, MMM  Neck: supple  CVS: regular rate and rhythm, no murmur  Resp: Good air entry bilaterally, No wheeze/rhonchi.  Abd: Soft non tender, non distended, +bowel sounds, No guarding, rebound tenderness   Ext: FROM in all ext, 2+ pulses throughout, cap refill<2 sec.  BACK: no midline tenderness, no step offs  NEURO: no focal deficit, gross motor and sensory intact throughout. General: Patient in no acute distress, AAOX3.   HENMT: NC/AT, no nasal congestion, MMM  Neck: supple  CVS: regular rate and rhythm, no murmur  Resp: Good air entry bilaterally, No wheeze/rhonchi.  Abd: Soft non tender, non distended, +bowel sounds, No guarding, rebound tenderness   Ext: + abrasion left knee, no ttp, FROM in all ext, 2+ pulses throughout, cap refill<2 sec.  BACK: no midline tenderness, no step offs  NEURO: no focal deficit, gross motor and sensory intact throughout.

## 2023-08-20 NOTE — ED PROVIDER NOTE - CLINICAL SUMMARY MEDICAL DECISION MAKING FREE TEXT BOX
Detailed lab letter sent home   89 y/o male w/ a PMHx of factor V Leiden, cardiomyopathy, HLD, DVT, and Afib presents to the ED via EMS c/o b/l LE weakness since last night. Pt wife reports pt was started on new CPAP machine for Cheyne stroke respirations, unable to sleep d/t pressure. Will r/o electrolyte abnormality vs fracture  vs DVT unlikely stroke based on presentation. Pt is neurologically intact. Plan to do labs, ekg, imaging. meds and reassess.

## 2023-08-20 NOTE — ED PROVIDER NOTE - OBJECTIVE STATEMENT
87 y/o male w/ a PMHx of factor V Leiden, cardiomyopathy, HLD, DVT, and Afib presents to the ED via EMS c/o b/l LE weakness since last night. Pt wife reports pt was started on new CPAP machine for Cheyne stroke respirations, unable to sleep d/t pressure. Pt also reports he was unable to walk to the bathroom last night, almost hit his left knee to the ground but did not fall, no head strike or LOC. Denies numbness, tingling, fever, chills, HA, dizziness, n/v/d, or abd pain. No other complaints at this time.

## 2023-08-20 NOTE — ED PROVIDER NOTE - WR ORDER ID 1
Problem: Inpatient Physical Therapy  Goal: Transfer Training Goal 1 LTG- PT  Outcome: Ongoing (interventions implemented as appropriate)    09/15/17 1640   Transfer Training PT LTG   Transfer Training PT LTG, Date Established 09/15/17   Transfer Training PT LTG, Time to Achieve by discharge   Transfer Training PT LTG, Activity Type bed to chair /chair to bed;sit to stand/stand to sit   Transfer Training PT LTG, [place] Level independent;conditional independence   Transfer Training PT LTG, Outcome goal ongoing       Goal: Gait Training Goal LTG- PT  Outcome: Ongoing (interventions implemented as appropriate)    09/15/17 1640   Gait Training PT LTG   Gait Training Goal PT LTG, Date Established 09/15/17   Gait Training Goal PT LTG, Time to Achieve by discharge   Gait Training Goal PT LTG, [place] Level independent;conditional independence   Gait Training Goal PT LTG, Distance to Achieve 300ft   Gait Training Goal PT LTG, Outcome goal ongoing       Goal: Stair Training Goal LTG- PT  Outcome: Ongoing (interventions implemented as appropriate)  Goal: Patient Education Goal LTG- PT  Outcome: Ongoing (interventions implemented as appropriate)    09/15/17 1640   Patient Education PT LTG   Patient Education PT LTG, Date Established 09/15/17   Patient Education PT LTG, Time to Achieve by discharge   Patient Education PT LTG, Education Type HEP;precaution per surgeon;gait;transfers;home safety;benefits of activity   Patient Education PT LTG Outcome goal ongoing            [id number]

## 2023-08-20 NOTE — ED ADULT TRIAGE NOTE - CHIEF COMPLAINT QUOTE
Pt BIBEMS c/o weakness to legs. pt reports he kept waking up during the night to urinate when he felt weak to legs, having difficulty standing up from sitting position. states he needed help from wife to help him stand. Negative BEFAST in ED. states he had new CPAP machine last week and feel like the settings are not correct

## 2023-08-20 NOTE — ED ADULT NURSE NOTE - OBJECTIVE STATEMENT
patient brought in by EMS from home with wife at bedside c/o weakness.  patient reports getting up multiple times during night to urinate and was progressively getting more weak with difficulty standing.  was having a hard time getting up from sitting. notes recently started using a CPAP machine.

## 2023-08-20 NOTE — ED PROVIDER NOTE - NS_ ATTENDINGSCRIBEDETAILS _ED_A_ED_FT
I, Jenniffer Castellanos DO,  performed the initial face to face bedside interview with this patient regarding history of present illness, review of symptoms and relevant past medical, social and family history.  I completed an independent physical examination.  I was the initial provider who evaluated this patient.   I personally saw the patient and performed a substantive portion of the visit including all aspects of the medical decision making.  The history, relevant review of systems, past medical and surgical history, medical decision making, and physical examination was documented by the scribe in my presence and I attest to the accuracy of the documentation.

## 2023-08-20 NOTE — PATIENT PROFILE ADULT - FUNCTIONAL ASSESSMENT - BASIC MOBILITY 6.
3-calculated by average/Not able to assess (calculate score using [facility] averaging method)

## 2023-08-21 DIAGNOSIS — I80.229 PHLEBITIS AND THROMBOPHLEBITIS OF UNSPECIFIED POPLITEAL VEIN: ICD-10-CM

## 2023-08-21 LAB
A1C WITH ESTIMATED AVERAGE GLUCOSE RESULT: 7.4 % — HIGH (ref 4–5.6)
ADD ON TEST-SPECIMEN IN LAB: SIGNIFICANT CHANGE UP
ANION GAP SERPL CALC-SCNC: 9 MMOL/L — SIGNIFICANT CHANGE UP (ref 5–17)
APTT BLD: 31.2 SEC — SIGNIFICANT CHANGE UP (ref 24.5–35.6)
APTT BLD: 36.8 SEC — HIGH (ref 24.5–35.6)
BASOPHILS # BLD AUTO: 0.01 K/UL — SIGNIFICANT CHANGE UP (ref 0–0.2)
BASOPHILS NFR BLD AUTO: 0.2 % — SIGNIFICANT CHANGE UP (ref 0–2)
BUN SERPL-MCNC: 19 MG/DL — SIGNIFICANT CHANGE UP (ref 7–23)
CALCIUM SERPL-MCNC: 8 MG/DL — LOW (ref 8.5–10.1)
CHLORIDE SERPL-SCNC: 105 MMOL/L — SIGNIFICANT CHANGE UP (ref 96–108)
CO2 SERPL-SCNC: 22 MMOL/L — SIGNIFICANT CHANGE UP (ref 22–31)
CREAT SERPL-MCNC: 1.06 MG/DL — SIGNIFICANT CHANGE UP (ref 0.5–1.3)
EGFR: 68 ML/MIN/1.73M2 — SIGNIFICANT CHANGE UP
EOSINOPHIL # BLD AUTO: 0 K/UL — SIGNIFICANT CHANGE UP (ref 0–0.5)
EOSINOPHIL NFR BLD AUTO: 0 % — SIGNIFICANT CHANGE UP (ref 0–6)
ESTIMATED AVERAGE GLUCOSE: 166 MG/DL — HIGH (ref 68–114)
GLUCOSE SERPL-MCNC: 93 MG/DL — SIGNIFICANT CHANGE UP (ref 70–99)
HCT VFR BLD CALC: 40.9 % — SIGNIFICANT CHANGE UP (ref 39–50)
HGB BLD-MCNC: 13.3 G/DL — SIGNIFICANT CHANGE UP (ref 13–17)
IMM GRANULOCYTES NFR BLD AUTO: 0.4 % — SIGNIFICANT CHANGE UP (ref 0–0.9)
INR BLD: 1.49 RATIO — HIGH (ref 0.85–1.18)
INR BLD: 1.56 RATIO — HIGH (ref 0.85–1.18)
LYMPHOCYTES # BLD AUTO: 0.91 K/UL — LOW (ref 1–3.3)
LYMPHOCYTES # BLD AUTO: 16.1 % — SIGNIFICANT CHANGE UP (ref 13–44)
MCHC RBC-ENTMCNC: 32.5 GM/DL — SIGNIFICANT CHANGE UP (ref 32–36)
MCHC RBC-ENTMCNC: 33 PG — SIGNIFICANT CHANGE UP (ref 27–34)
MCV RBC AUTO: 101.5 FL — HIGH (ref 80–100)
MONOCYTES # BLD AUTO: 0.86 K/UL — SIGNIFICANT CHANGE UP (ref 0–0.9)
MONOCYTES NFR BLD AUTO: 15.2 % — HIGH (ref 2–14)
NEUTROPHILS # BLD AUTO: 3.84 K/UL — SIGNIFICANT CHANGE UP (ref 1.8–7.4)
NEUTROPHILS NFR BLD AUTO: 68.1 % — SIGNIFICANT CHANGE UP (ref 43–77)
PLATELET # BLD AUTO: 48 K/UL — LOW (ref 150–400)
POTASSIUM SERPL-MCNC: 4.1 MMOL/L — SIGNIFICANT CHANGE UP (ref 3.5–5.3)
POTASSIUM SERPL-SCNC: 4.1 MMOL/L — SIGNIFICANT CHANGE UP (ref 3.5–5.3)
PROTHROM AB SERPL-ACNC: 16.6 SEC — HIGH (ref 9.5–13)
PROTHROM AB SERPL-ACNC: 17.4 SEC — HIGH (ref 9.5–13)
RBC # BLD: 4.03 M/UL — LOW (ref 4.2–5.8)
RBC # FLD: 13.6 % — SIGNIFICANT CHANGE UP (ref 10.3–14.5)
SODIUM SERPL-SCNC: 136 MMOL/L — SIGNIFICANT CHANGE UP (ref 135–145)
WBC # BLD: 5.64 K/UL — SIGNIFICANT CHANGE UP (ref 3.8–10.5)
WBC # FLD AUTO: 5.64 K/UL — SIGNIFICANT CHANGE UP (ref 3.8–10.5)

## 2023-08-21 PROCEDURE — 12345: CPT | Mod: NC

## 2023-08-21 PROCEDURE — 99223 1ST HOSP IP/OBS HIGH 75: CPT

## 2023-08-21 PROCEDURE — 99497 ADVNCD CARE PLAN 30 MIN: CPT | Mod: 25

## 2023-08-21 RX ORDER — WARFARIN SODIUM 2.5 MG/1
4 TABLET ORAL DAILY
Refills: 0 | Status: DISCONTINUED | OUTPATIENT
Start: 2023-08-21 | End: 2023-08-22

## 2023-08-21 RX ORDER — LACTOBACILLUS ACIDOPHILUS 100MM CELL
1 CAPSULE ORAL EVERY 12 HOURS
Refills: 0 | Status: DISCONTINUED | OUTPATIENT
Start: 2023-08-21 | End: 2023-08-23

## 2023-08-21 RX ORDER — WARFARIN SODIUM 2.5 MG/1
2 TABLET ORAL ONCE
Refills: 0 | Status: DISCONTINUED | OUTPATIENT
Start: 2023-08-21 | End: 2023-08-21

## 2023-08-21 RX ORDER — ONDANSETRON 8 MG/1
4 TABLET, FILM COATED ORAL EVERY 8 HOURS
Refills: 0 | Status: DISCONTINUED | OUTPATIENT
Start: 2023-08-21 | End: 2023-08-23

## 2023-08-21 RX ORDER — CARVEDILOL PHOSPHATE 80 MG/1
12.5 CAPSULE, EXTENDED RELEASE ORAL EVERY 12 HOURS
Refills: 0 | Status: DISCONTINUED | OUTPATIENT
Start: 2023-08-21 | End: 2023-08-23

## 2023-08-21 RX ORDER — TIOTROPIUM BROMIDE 18 UG/1
2 CAPSULE ORAL; RESPIRATORY (INHALATION) DAILY
Refills: 0 | Status: DISCONTINUED | OUTPATIENT
Start: 2023-08-21 | End: 2023-08-23

## 2023-08-21 RX ORDER — ALBUTEROL 90 UG/1
2 AEROSOL, METERED ORAL EVERY 6 HOURS
Refills: 0 | Status: DISCONTINUED | OUTPATIENT
Start: 2023-08-21 | End: 2023-08-23

## 2023-08-21 RX ORDER — BUDESONIDE, MICRONIZED 100 %
0.5 POWDER (GRAM) MISCELLANEOUS DAILY
Refills: 0 | Status: DISCONTINUED | OUTPATIENT
Start: 2023-08-21 | End: 2023-08-23

## 2023-08-21 RX ORDER — ASPIRIN/CALCIUM CARB/MAGNESIUM 324 MG
81 TABLET ORAL DAILY
Refills: 0 | Status: DISCONTINUED | OUTPATIENT
Start: 2023-08-21 | End: 2023-08-23

## 2023-08-21 RX ORDER — GUAIFENESIN/DEXTROMETHORPHAN 600MG-30MG
10 TABLET, EXTENDED RELEASE 12 HR ORAL EVERY 4 HOURS
Refills: 0 | Status: DISCONTINUED | OUTPATIENT
Start: 2023-08-21 | End: 2023-08-23

## 2023-08-21 RX ORDER — ATORVASTATIN CALCIUM 80 MG/1
40 TABLET, FILM COATED ORAL AT BEDTIME
Refills: 0 | Status: DISCONTINUED | OUTPATIENT
Start: 2023-08-21 | End: 2023-08-23

## 2023-08-21 RX ORDER — SODIUM CHLORIDE 9 MG/ML
1000 INJECTION INTRAMUSCULAR; INTRAVENOUS; SUBCUTANEOUS
Refills: 0 | Status: COMPLETED | OUTPATIENT
Start: 2023-08-21 | End: 2023-08-21

## 2023-08-21 RX ORDER — CHOLECALCIFEROL (VITAMIN D3) 125 MCG
1000 CAPSULE ORAL DAILY
Refills: 0 | Status: DISCONTINUED | OUTPATIENT
Start: 2023-08-21 | End: 2023-08-23

## 2023-08-21 RX ORDER — ACETAMINOPHEN 500 MG
650 TABLET ORAL EVERY 6 HOURS
Refills: 0 | Status: DISCONTINUED | OUTPATIENT
Start: 2023-08-21 | End: 2023-08-23

## 2023-08-21 RX ORDER — LANOLIN ALCOHOL/MO/W.PET/CERES
3 CREAM (GRAM) TOPICAL AT BEDTIME
Refills: 0 | Status: DISCONTINUED | OUTPATIENT
Start: 2023-08-21 | End: 2023-08-23

## 2023-08-21 RX ORDER — DIGOXIN 250 MCG
125 TABLET ORAL DAILY
Refills: 0 | Status: DISCONTINUED | OUTPATIENT
Start: 2023-08-21 | End: 2023-08-23

## 2023-08-21 RX ORDER — CHOLECALCIFEROL (VITAMIN D3) 125 MCG
1 CAPSULE ORAL
Refills: 0 | DISCHARGE

## 2023-08-21 RX ADMIN — TIOTROPIUM BROMIDE 2 PUFF(S): 18 CAPSULE ORAL; RESPIRATORY (INHALATION) at 08:25

## 2023-08-21 RX ADMIN — CARVEDILOL PHOSPHATE 12.5 MILLIGRAM(S): 80 CAPSULE, EXTENDED RELEASE ORAL at 09:35

## 2023-08-21 RX ADMIN — SODIUM CHLORIDE 100 MILLILITER(S): 9 INJECTION INTRAMUSCULAR; INTRAVENOUS; SUBCUTANEOUS at 13:32

## 2023-08-21 RX ADMIN — Medication 81 MILLIGRAM(S): at 09:35

## 2023-08-21 RX ADMIN — ATORVASTATIN CALCIUM 40 MILLIGRAM(S): 80 TABLET, FILM COATED ORAL at 22:52

## 2023-08-21 RX ADMIN — SODIUM CHLORIDE 100 MILLILITER(S): 9 INJECTION INTRAMUSCULAR; INTRAVENOUS; SUBCUTANEOUS at 07:27

## 2023-08-21 RX ADMIN — Medication 1 TABLET(S): at 09:35

## 2023-08-21 RX ADMIN — Medication 0.5 MILLIGRAM(S): at 08:23

## 2023-08-21 RX ADMIN — Medication 1 TABLET(S): at 13:46

## 2023-08-21 RX ADMIN — CARVEDILOL PHOSPHATE 12.5 MILLIGRAM(S): 80 CAPSULE, EXTENDED RELEASE ORAL at 22:52

## 2023-08-21 RX ADMIN — Medication 125 MICROGRAM(S): at 09:35

## 2023-08-21 RX ADMIN — Medication 1000 UNIT(S): at 09:35

## 2023-08-21 RX ADMIN — WARFARIN SODIUM 4 MILLIGRAM(S): 2.5 TABLET ORAL at 22:55

## 2023-08-21 RX ADMIN — Medication 1 TABLET(S): at 22:52

## 2023-08-21 NOTE — INPATIENT CERTIFICATION FOR MEDICARE PATIENTS - CURRENT MEDICAL NEEDS AND CARE PLANS
Isabel discutimos en la oficina, onelia ultimos examenes de luda mustran que onelia rinones ontiveros empeorado  Quiero que baje el diuretico (Torsemide) a jacey tableta cada otro patricia (interdiario) y quiero que se repita examenes de luda en un par de sheehan  Siga jacey dieta baja de sal   Siga chequeando rondon presion en la casa, y si rondon presion es menos de 120 NO tome Hydralazine  La quiero flavia de regreso en umos 2-3 [employer]    No tome NSAIDs (NO ibuprofen, Motrin, Advil, Aleve, naproxen  Puede roldan Tylenol or paracetamol or acetaminophen para el dolor si es necesario  Siga viendo a rondon doctor de cabecera  [place] discutimos otra vez, si onelia rinones no mejoran tendremos que hacer jacey biopsia la rinon [facility]

## 2023-08-21 NOTE — PHYSICAL THERAPY INITIAL EVALUATION ADULT - GENERAL OBSERVATIONS, REHAB EVAL
pt received in bed on 2S on airborne isolation precautions for COVID+.  pt pleasant and cooperative, no complaints.  agreeable to PT.  post session pt left in bed, CBWR, bed alarm on, oriented to call bell system, instructed not to get up without assistance, pt verbalized understanding.

## 2023-08-21 NOTE — H&P ADULT - ASSESSMENT
89 y/o M presented with lower extremity weakness     1. Bilateral lower extremity weakness likely secondary to COVID 19   - Admit to med/surg   - Isolation precautions   - Mucinex, albuterol, tessalon PRN   - SpO2 96% on room air -> no indication for Remdesivir or Decadron at this time   - Monitor SpO2 closely   - PT evaluation     2. SIRS positive secondary to COVID   - Temp 100.8F, HR   - s/p Levaquin; monitor off further abx   - f/u UCx, BCx x 2, procalcitonin; adjust abx based on sensitivities  - Trend WBC, monitor for temperatures   - Tylenol for temperatures PRN   - s/p 500 ml of NS, monitor off additional IVF     3. Chronic DVT   - US: Nonocclusive deep venous thrombosis in the right popliteal and posterior tibial veins; thrombus in the popliteal vein has an appearance suggestive of chronic thrombus and the thrombus may all be chronic, however is of uncertain chronicity; comparison is recommended with prior imaging if available.  - f/u with pt's PCP to obtain prior reports   - DVT appears to be chronic     4. Asymptomatic bacteruria   - UA: small leukocyte esterase, moderate blood, few bacteria  - f/u UCx, if positive will treat with antibiotics     5. Subtherapeutic INR   - INR 1.56, will give pt 2 mg of Warfarin today   - f/u AM PT/PTT/INR   - May need to adjust dose based on INR     6. History of chronic a-fib, CAD, pre-DM, dyslipidemia, DVT on coumadin, factor V leiden, cardiomyopathy, ADRY  - c/w home medications; verified with pt at the bedside  - PLT 71, glucose 142 -> f/u HbA1c     DVT ppx: Warfarin 2 mg today -> f/u AM PT/PTT/INR and adjust dose   Code status: Full code   Emergency contact: Fernanda Vazquez (wife) [phone]     I spent a total of 75 minutes on the date of this encounter coordinating the patient's care. This includes reviewing prior documentation, results and imaging in addition to completing a full history and physical examination on the patient. Further tests, medications, and procedures have been ordered as indicated. Laboratory results and the plan of care were communicated to the patient and/or their family member. Supporting documentation was completed and added to the patient's chart.

## 2023-08-21 NOTE — H&P ADULT - HISTORY OF PRESENT ILLNESS
87 y/o M with PMH chronic a-fib, CAD, pre-DM, dyslipidemia, DVT on coumadin, factor V leiden, cardiomyopathy, ADRY presents with bilateral lower extremity weakness that started last night. The pt started using CPAP recently and was unable to sleep. He was having difficulty walking today which prompted him to come to the ER. Reports chest congestion, no other symptoms. Of note, pt has been experiencing lightheadeness and dizziness with standing since April. He was diagnosed with BPPV and encouraged to f/u with vestibular therapy outpt. Denies fevers, chills, chest pain, SOB, abdominal pain, N/V, diarrhea/constipation, slurred speech, numbness/tingling of the extremities, synocpe.      Prior admission:   - 4/18/23: Unsteady gait -> outpt vestibular PT     ER course: Temp 100.8F, HR . Labs: PLT 71, neutrophils 80.2%, glucose 142. UA: small leukocyte esterase, moderate blood, few bacteria. COVID positive. EKG: A fib with HR 98 bpm, PVCs, no ST changes (personally reviewed)    Imaging:   - Lower extremity doppler b/l: Nonocclusive deep venous thrombosis in the right popliteal and posterior tibial veins; thrombus in the popliteal vein has an appearance suggestive of chronic thrombus and the thrombus may all be chronic, however is of uncertain chronicity; comparison is recommended with prior imaging if available. No evidence of deep venous thrombosis in the left lower extremity.  - CT head: No acute hemorrhage, mass or mass effect.  - XR left knee: No acute findings.  - XR pelvis: No acute findings.  - CXR: No acute findings.    Pt was given Levaquin, 500 ml of NS, tylenol. He is being admitted to med/surg for further management.

## 2023-08-21 NOTE — H&P ADULT - NSHPREVIEWOFSYSTEMS_GEN_ALL_CORE
Constitutional: negative for fatigue, negative for fever, negative for chills, negative for decreased appetite, positive for weakness.   Skin: negative for rashes, negative for open wounds, negative for jaundice.   Eyes: negative for blurry vision, negative for double vision.   Ears, nose, throat: negative for ear pain, negative for nasal congestion, negative for sore throat, negative for lymph node swelling.   Cardiovascular: negative for chest pain, negative for palpitations, negative for lower extremity swelling.   Respiratory: negative for shortness of breath, negative for wheezing, negative for cough.   Gastrointestinal: negative for abdominal pain, negative for nausea, negative for vomiting, negative for diarrhea, negative for constipation, negative for blood in the stool, negative for black tarry stools.   Genitourinary: negative for burning on urination, negative for urinary urgency or frequency, negative for blood in the urine.   Endocrine: negative for cold intolerance, negative for heat intolerance, negative for increased thirst.   Hematologic: negative for easy bruising or bleeding.   Musculoskeletal: negative for muscle/joint pain, negative for decreased range of motion.   Neurological: negative for dizziness, negative for headaches, negative for loss of consciousness, negative for motor weakness, negative for sensory deficits.   Psychiatric: negative for depression, negative for anxiety.

## 2023-08-21 NOTE — H&P ADULT - NSHPPHYSICALEXAM_GEN_ALL_CORE
ICU Vital Signs Last 24 Hrs  T(C): 36.7 (20 Aug 2023 23:21), Max: 38.2 (20 Aug 2023 11:39)  T(F): 98.1 (20 Aug 2023 23:21), Max: 100.8 (20 Aug 2023 11:39)  HR: 82 (20 Aug 2023 23:21) (80 - 108)  BP: 100/57 (20 Aug 2023 23:21) (100/57 - 142/87)  BP(mean): 89 (20 Aug 2023 19:44) (89 - 94)  RR: 18 (20 Aug 2023 23:21) (18 - 24)  SpO2: 94% (20 Aug 2023 23:21) (93% - 96%)    O2 Parameters below as of 20 Aug 2023 23:21  Patient On (Oxygen Delivery Method): room air    General: Awake and alert, cooperative with exam. No acute distress.   Skin: Warm, dry, and pink.   Eyes: Pupils equal and reactive to light. Extraocular eye movements intact. No conjunctival injection, discharge, or scleral icterus.   HEENT: Atraumatic, normocephalic. Moist mucus membranes.  Cardiology: Normal S1, S2. No murmurs, rubs, or gallops. Irregularly irregular.   Respiratory: Lungs clear to ascultation bilaterally. Good air exchange. No wheezes, rales, or rhonchi. Normal chest expansion.   Gastrointestinal: Positive bowel sounds. Soft, non-tender, non-distended. No guarding, rigidity, or rebound tenderness. No hepatosplenomegaly.   Musculoskeletal: 5/5 motor strength in all extremities. Normal range of motion. No calf tenderness b/l.   Extremities: No peripheral edema bilaterally. Dorsalis pedis pulses 2+ bilaterally.   Neurological: A+Ox3 (person, place, and time). Cranial nerves 2-12 intact. Normal speech. No facial droop. No focal neurological deficits. 5/5 motor strength in all extremities. Sensation intact.   Psychiatric: Normal affect. Normal mood.

## 2023-08-21 NOTE — H&P ADULT - NSHPSOCIALHISTORY_GEN_ALL_CORE
Lives with his wife. Uses a cane to ambulate. Independent with ADLs and IADLs. Denies smoking, alcohol, drug use .

## 2023-08-21 NOTE — PHYSICAL THERAPY INITIAL EVALUATION ADULT - PERTINENT HX OF CURRENT PROBLEM, REHAB EVAL
88M 89 y/o M with PMH chronic a-fib, CAD, pre-DM, dyslipidemia, DVT on coumadin, factor V leiden, cardiomyopathy, ADRY presents with bilateral lower extremity weakness that started last night. The pt started using CPAP recently and was unable to sleep. He was having difficulty walking today which prompted him to come to the ER. Reports chest congestion, no other symptoms. Of note, pt has been experiencing lightheadeness and dizziness with standing since April. He was diagnosed with BPPV and encouraged to f/u with vestibular therapy outpt.

## 2023-08-22 LAB
APTT BLD: 37.2 SEC — HIGH (ref 24.5–35.6)
APTT BLD: > 200 SEC (ref 24.5–35.6)
HCT VFR BLD CALC: 41.2 % — SIGNIFICANT CHANGE UP (ref 39–50)
HGB BLD-MCNC: 13.9 G/DL — SIGNIFICANT CHANGE UP (ref 13–17)
INR BLD: 1.55 RATIO — HIGH (ref 0.85–1.18)
INR BLD: 1.56 RATIO — HIGH (ref 0.85–1.18)
MCHC RBC-ENTMCNC: 33.7 GM/DL — SIGNIFICANT CHANGE UP (ref 32–36)
MCHC RBC-ENTMCNC: 33.8 PG — SIGNIFICANT CHANGE UP (ref 27–34)
MCV RBC AUTO: 100.2 FL — HIGH (ref 80–100)
PLATELET # BLD AUTO: 51 K/UL — LOW (ref 150–400)
PROTHROM AB SERPL-ACNC: 17.3 SEC — HIGH (ref 9.5–13)
PROTHROM AB SERPL-ACNC: 17.4 SEC — HIGH (ref 9.5–13)
RBC # BLD: 4.11 M/UL — LOW (ref 4.2–5.8)
RBC # FLD: 13.4 % — SIGNIFICANT CHANGE UP (ref 10.3–14.5)
WBC # BLD: 4.8 K/UL — SIGNIFICANT CHANGE UP (ref 3.8–10.5)
WBC # FLD AUTO: 4.8 K/UL — SIGNIFICANT CHANGE UP (ref 3.8–10.5)

## 2023-08-22 PROCEDURE — 99233 SBSQ HOSP IP/OBS HIGH 50: CPT

## 2023-08-22 RX ORDER — HEPARIN SODIUM 5000 [USP'U]/ML
INJECTION INTRAVENOUS; SUBCUTANEOUS
Qty: 25000 | Refills: 0 | Status: DISCONTINUED | OUTPATIENT
Start: 2023-08-22 | End: 2023-08-23

## 2023-08-22 RX ORDER — HEPARIN SODIUM 5000 [USP'U]/ML
8000 INJECTION INTRAVENOUS; SUBCUTANEOUS ONCE
Refills: 0 | Status: COMPLETED | OUTPATIENT
Start: 2023-08-22 | End: 2023-08-22

## 2023-08-22 RX ORDER — WARFARIN SODIUM 2.5 MG/1
7.5 TABLET ORAL DAILY
Refills: 0 | Status: DISCONTINUED | OUTPATIENT
Start: 2023-08-22 | End: 2023-08-23

## 2023-08-22 RX ORDER — ENOXAPARIN SODIUM 100 MG/ML
100 INJECTION SUBCUTANEOUS EVERY 12 HOURS
Refills: 0 | Status: DISCONTINUED | OUTPATIENT
Start: 2023-08-22 | End: 2023-08-22

## 2023-08-22 RX ORDER — HEPARIN SODIUM 5000 [USP'U]/ML
8000 INJECTION INTRAVENOUS; SUBCUTANEOUS EVERY 6 HOURS
Refills: 0 | Status: DISCONTINUED | OUTPATIENT
Start: 2023-08-22 | End: 2023-08-23

## 2023-08-22 RX ORDER — HEPARIN SODIUM 5000 [USP'U]/ML
4000 INJECTION INTRAVENOUS; SUBCUTANEOUS EVERY 6 HOURS
Refills: 0 | Status: DISCONTINUED | OUTPATIENT
Start: 2023-08-22 | End: 2023-08-23

## 2023-08-22 RX ADMIN — WARFARIN SODIUM 7.5 MILLIGRAM(S): 2.5 TABLET ORAL at 22:24

## 2023-08-22 RX ADMIN — Medication 125 MICROGRAM(S): at 11:05

## 2023-08-22 RX ADMIN — ATORVASTATIN CALCIUM 40 MILLIGRAM(S): 80 TABLET, FILM COATED ORAL at 22:24

## 2023-08-22 RX ADMIN — Medication 1 TABLET(S): at 11:05

## 2023-08-22 RX ADMIN — HEPARIN SODIUM 8000 UNIT(S): 5000 INJECTION INTRAVENOUS; SUBCUTANEOUS at 17:02

## 2023-08-22 RX ADMIN — Medication 1 TABLET(S): at 22:24

## 2023-08-22 RX ADMIN — CARVEDILOL PHOSPHATE 12.5 MILLIGRAM(S): 80 CAPSULE, EXTENDED RELEASE ORAL at 11:04

## 2023-08-22 RX ADMIN — HEPARIN SODIUM 1800 UNIT(S)/HR: 5000 INJECTION INTRAVENOUS; SUBCUTANEOUS at 19:10

## 2023-08-22 RX ADMIN — HEPARIN SODIUM 1800 UNIT(S)/HR: 5000 INJECTION INTRAVENOUS; SUBCUTANEOUS at 17:04

## 2023-08-22 RX ADMIN — Medication 1 TABLET(S): at 11:04

## 2023-08-22 RX ADMIN — Medication 1000 UNIT(S): at 11:05

## 2023-08-22 RX ADMIN — Medication 0.5 MILLIGRAM(S): at 08:23

## 2023-08-22 RX ADMIN — Medication 81 MILLIGRAM(S): at 11:04

## 2023-08-22 RX ADMIN — TIOTROPIUM BROMIDE 2 PUFF(S): 18 CAPSULE ORAL; RESPIRATORY (INHALATION) at 08:23

## 2023-08-22 RX ADMIN — CARVEDILOL PHOSPHATE 12.5 MILLIGRAM(S): 80 CAPSULE, EXTENDED RELEASE ORAL at 22:24

## 2023-08-22 NOTE — CONSULT NOTE ADULT - SUBJECTIVE AND OBJECTIVE BOX
Patient is a 88y old  Male who presents with a chief complaint of Bilateral lower extremity weakness (21 Aug 2023 18:54)      HPI:  87 y/o M with PMH chronic a-fib, CAD, pre-DM, dyslipidemia, DVT on coumadin, factor V leiden, cardiomyopathy, ADRY presents with bilateral lower extremity weakness that started last night. The pt started using CPAP recently and was unable to sleep. He was having difficulty walking today which prompted him to come to the ER. Reports chest congestion, no other symptoms. Of note, pt has been experiencing lightheadeness and dizziness with standing since April. He was diagnosed with BPPV and encouraged to f/u with vestibular therapy outpt. Denies fevers, chills, chest pain, SOB, abdominal pain, N/V, diarrhea/constipation, slurred speech, numbness/tingling of the extremities, synocpe.      Prior admission:   - 4/18/23: Unsteady gait -> outpt vestibular PT     ER course: Temp 100.8F, HR . Labs: PLT 71, neutrophils 80.2%, glucose 142. UA: small leukocyte esterase, moderate blood, few bacteria. COVID positive. EKG: A fib with HR 98 bpm, PVCs, no ST changes (personally reviewed)    Imaging:   - Lower extremity doppler b/l: Nonocclusive deep venous thrombosis in the right popliteal and posterior tibial veins; thrombus in the popliteal vein has an appearance suggestive of chronic thrombus and the thrombus may all be chronic, however is of uncertain chronicity; comparison is recommended with prior imaging if available. No evidence of deep venous thrombosis in the left lower extremity.  - CT head: No acute hemorrhage, mass or mass effect.  - XR left knee: No acute findings.  - XR pelvis: No acute findings.  - CXR: No acute findings.    Pt was given Levaquin, 500 ml of NS, tylenol. He is being admitted to med/surg for further management.  (21 Aug 2023 02:57)      PAST MEDICAL & SURGICAL HISTORY:  Afib      DVT (deep venous thrombosis)      Hyperlipemia      H/O cardiomyopathy      Factor V Leiden      History of appendectomy          PREVIOUS DIAGNOSTIC TESTING:      MEDICATIONS  (STANDING):  aspirin enteric coated 81 milliGRAM(s) Oral daily  atorvastatin 40 milliGRAM(s) Oral at bedtime  buDESOnide    Inhalation Suspension 0.5 milliGRAM(s) Inhalation daily  carvedilol 12.5 milliGRAM(s) Oral every 12 hours  cholecalciferol 1000 Unit(s) Oral daily  digoxin     Tablet 125 MICROGram(s) Oral daily  lactobacillus acidophilus 1 Tablet(s) Oral every 12 hours  multivitamin 1 Tablet(s) Oral daily  tiotropium 2.5 MICROgram(s) Inhaler 2 Puff(s) Inhalation daily  warfarin 4 milliGRAM(s) Oral daily    MEDICATIONS  (PRN):  acetaminophen     Tablet .. 650 milliGRAM(s) Oral every 6 hours PRN Mild Pain (1 - 3)  acetaminophen     Tablet .. 650 milliGRAM(s) Oral every 6 hours PRN Temp greater or equal to 38C (100.4F), Mild Pain (1 - 3)  albuterol    90 MICROgram(s) HFA Inhaler 2 Puff(s) Inhalation every 6 hours PRN Bronchospasm  aluminum hydroxide/magnesium hydroxide/simethicone Suspension 30 milliLiter(s) Oral every 4 hours PRN Dyspepsia  benzonatate 100 milliGRAM(s) Oral three times a day PRN Cough  guaifenesin/dextromethorphan Oral Liquid 10 milliLiter(s) Oral every 4 hours PRN Cough  melatonin 3 milliGRAM(s) Oral at bedtime PRN Insomnia  ondansetron Injectable 4 milliGRAM(s) IV Push every 8 hours PRN Nausea and/or Vomiting      FAMILY HISTORY:  FH: diabetes mellitus    FH: breast cancer        SOCIAL HISTORY:  ***    REVIEW OF SYSTEM:  Pertinent items are noted in HPI.      Vital Signs Last 24 Hrs  T(C): 36.5 (21 Aug 2023 16:03), Max: 37 (21 Aug 2023 07:55)  T(F): 97.7 (21 Aug 2023 16:03), Max: 98.6 (21 Aug 2023 07:55)  HR: 69 (21 Aug 2023 16:03) (69 - 81)  BP: 133/83 (21 Aug 2023 16:03) (121/81 - 133/83)  BP(mean): --  RR: 18 (21 Aug 2023 16:03) (18 - 18)  SpO2: 96% (21 Aug 2023 16:03) (96% - 96%)    Parameters below as of 21 Aug 2023 16:03  Patient On (Oxygen Delivery Method): room air        I&O's Summary    PHYSICAL EXAM  General Appearance: cooperative, no acute distress,   HEENT: PERRL, conjunctiva clear, EOM's intact, non injected pharynx, no exudate, TM   normal  Neck: Supple, , no adenopathy, thyroid: not enlarged, no carotid bruit or JVD  Back: Symmetric, no  tenderness,no soft tissue tenderness  Lungs: Bibasilar rales, no wheeze  Heart: Regular rate and rhythm, S1, S2 normal, no murmur, rub or gallop  Abdomen: Soft, non-tender, bowel sounds active , no hepatosplenomegaly  Extremities: no cyanosis or edema, no joint swelling  Skin: Skin color, texture normal, no rashes   Neurologic: Alert and oriented X3 , cranial nerves intact, sensory and motor normal,    ECG:    LABS:                          13.3   5.64  )-----------( 48       ( 21 Aug 2023 08:50 )             40.9     08-21    136  |  105  |  19  ----------------------------<  93  4.1   |  22  |  1.06    Ca    8.0<L>      21 Aug 2023 08:50  Mg     2.4     08-20    TPro  7.6  /  Alb  3.9  /  TBili  0.9  /  DBili  x   /  AST  35  /  ALT  43  /  AlkPhos  51  08-20    CARDIAC MARKERS ( 20 Aug 2023 09:04 )  x     / x     / 216 U/L / x     / x              PT/INR - ( 21 Aug 2023 08:50 )   PT: 16.6 sec;   INR: 1.49 ratio         PTT - ( 21 Aug 2023 08:50 )  PTT:31.2 sec  Urinalysis Basic - ( 21 Aug 2023 08:50 )    Color: x / Appearance: x / SG: x / pH: x  Gluc: 93 mg/dL / Ketone: x  / Bili: x / Urobili: x   Blood: x / Protein: x / Nitrite: x   Leuk Esterase: x / RBC: x / WBC x   Sq Epi: x / Non Sq Epi: x / Bacteria: x            RADIOLOGY & ADDITIONAL STUDIES:  < from: Xray Chest 1 View- PORTABLE-Urgent (08.20.23 @ 10:13) >  INTERPRETATION:  Left knee, pelvis, and chest. Patient has left leg   weakness.    Left knee. 3 views.    Some arterial calcifications are noted.    No effusion is evident.    There are slight degenerative findings. No fracture.    Pelvis. There is a right hip replacement similar to December 20, 2021.    There is mild degeneration of the left hip. No visible fracture.    AP chest on August 20, 2023 at 10:00 AM.    Heart magnified by technique.    Lungs are grossly clear.    Chest is similar to April 15, 2023.    IMPRESSION: No acute findings.    < end of copied text >

## 2023-08-22 NOTE — CONSULT NOTE ADULT - ASSESSMENT
87 y/o M with PMH chronic a-fib, CAD, pre-DM, dyslipidemia, DVT on coumadin, factor V leiden, cardiomyopathy, ADRY presents with bilateral lower extremity weakness that started last night. The pt started using CPAP recently and was unable to sleep. He was having difficulty walking today which prompted him to come to the ER. Reports chest congestion, no other symptoms. Of note, pt has been experiencing lightheadeness and dizziness with standing since April. He was diagnosed with BPPV and encouraged to f/u with vestibular therapy outpt. Denies fevers, chills, chest pain, SOB, abdominal pain, N/V, diarrhea/constipation, slurred speech, numbness/tingling of the extremities, synocpe.      Prior admission:   - 4/18/23: Unsteady gait -> outpt vestibular PT     ER course: Temp 100.8F, HR . Labs: PLT 71, neutrophils 80.2%, glucose 142. UA: small leukocyte esterase, moderate blood, few bacteria. COVID positive. EKG: A fib with HR 98 bpm, PVCs, no ST changes (personally reviewed)    Imaging:   - Lower extremity doppler b/l: Nonocclusive deep venous thrombosis in the right popliteal and posterior tibial veins; thrombus in the popliteal vein has an appearance suggestive of chronic thrombus and the thrombus may all be chronic, however is of uncertain chronicity; comparison is recommended with prior imaging if available. No evidence of deep venous thrombosis in the left lower extremity.  - CT head: No acute hemorrhage, mass or mass effect.  - XR left knee: No acute findings.  - XR pelvis: No acute findings.  - CXR: No acute findings.    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

## 2023-08-23 ENCOUNTER — TRANSCRIPTION ENCOUNTER (OUTPATIENT)
Age: 88
End: 2023-08-23

## 2023-08-23 VITALS
RESPIRATION RATE: 18 BRPM | SYSTOLIC BLOOD PRESSURE: 135 MMHG | HEART RATE: 79 BPM | DIASTOLIC BLOOD PRESSURE: 78 MMHG | OXYGEN SATURATION: 95 % | TEMPERATURE: 98 F

## 2023-08-23 LAB
APTT BLD: >200 SEC — CRITICAL HIGH (ref 24.5–35.6)
CULTURE RESULTS: SIGNIFICANT CHANGE UP
D DIMER BLD IA.RAPID-MCNC: <150 NG/ML DDU — SIGNIFICANT CHANGE UP
DACRYOCYTES BLD QL SMEAR: SLIGHT — SIGNIFICANT CHANGE UP
HCT VFR BLD CALC: 40.9 % — SIGNIFICANT CHANGE UP (ref 39–50)
HCT VFR BLD CALC: 42.1 % — SIGNIFICANT CHANGE UP (ref 39–50)
HGB BLD-MCNC: 13.8 G/DL — SIGNIFICANT CHANGE UP (ref 13–17)
HGB BLD-MCNC: 14.1 G/DL — SIGNIFICANT CHANGE UP (ref 13–17)
HYPOCHROMIA BLD QL: SLIGHT — SIGNIFICANT CHANGE UP
INR BLD: 2.21 RATIO — HIGH (ref 0.85–1.18)
MACROCYTES BLD QL: SLIGHT — SIGNIFICANT CHANGE UP
MANUAL SMEAR VERIFICATION: SIGNIFICANT CHANGE UP
MCHC RBC-ENTMCNC: 33.5 GM/DL — SIGNIFICANT CHANGE UP (ref 32–36)
MCHC RBC-ENTMCNC: 33.5 PG — SIGNIFICANT CHANGE UP (ref 27–34)
MCHC RBC-ENTMCNC: 33.6 PG — SIGNIFICANT CHANGE UP (ref 27–34)
MCHC RBC-ENTMCNC: 33.7 GM/DL — SIGNIFICANT CHANGE UP (ref 32–36)
MCV RBC AUTO: 100 FL — SIGNIFICANT CHANGE UP (ref 80–100)
MCV RBC AUTO: 99.5 FL — SIGNIFICANT CHANGE UP (ref 80–100)
OVALOCYTES BLD QL SMEAR: SLIGHT — SIGNIFICANT CHANGE UP
PLAT MORPH BLD: NORMAL — SIGNIFICANT CHANGE UP
PLATELET # BLD AUTO: 50 K/UL — LOW (ref 150–400)
PLATELET # BLD AUTO: 54 K/UL — LOW (ref 150–400)
POIKILOCYTOSIS BLD QL AUTO: SIGNIFICANT CHANGE UP
PROTHROM AB SERPL-ACNC: 24.4 SEC — HIGH (ref 9.5–13)
RBC # BLD: 4.11 M/UL — LOW (ref 4.2–5.8)
RBC # BLD: 4.21 M/UL — SIGNIFICANT CHANGE UP (ref 4.2–5.8)
RBC # FLD: 13.3 % — SIGNIFICANT CHANGE UP (ref 10.3–14.5)
RBC # FLD: 13.5 % — SIGNIFICANT CHANGE UP (ref 10.3–14.5)
RBC BLD AUTO: ABNORMAL
SCHISTOCYTES BLD QL AUTO: SLIGHT — SIGNIFICANT CHANGE UP
SPECIMEN SOURCE: SIGNIFICANT CHANGE UP
TARGETS BLD QL SMEAR: SLIGHT — SIGNIFICANT CHANGE UP
WBC # BLD: 4.69 K/UL — SIGNIFICANT CHANGE UP (ref 3.8–10.5)
WBC # BLD: 5.15 K/UL — SIGNIFICANT CHANGE UP (ref 3.8–10.5)
WBC # FLD AUTO: 4.69 K/UL — SIGNIFICANT CHANGE UP (ref 3.8–10.5)
WBC # FLD AUTO: 5.15 K/UL — SIGNIFICANT CHANGE UP (ref 3.8–10.5)

## 2023-08-23 PROCEDURE — 99239 HOSP IP/OBS DSCHRG MGMT >30: CPT

## 2023-08-23 RX ORDER — ALBUTEROL 90 UG/1
2 AEROSOL, METERED ORAL
Qty: 1 | Refills: 0
Start: 2023-08-23

## 2023-08-23 RX ORDER — MOMETASONE FUROATE 220 UG/1
1 INHALANT RESPIRATORY (INHALATION)
Qty: 1 | Refills: 0
Start: 2023-08-23 | End: 2023-09-21

## 2023-08-23 RX ORDER — WARFARIN SODIUM 2.5 MG/1
1 TABLET ORAL
Refills: 0 | DISCHARGE

## 2023-08-23 RX ORDER — WARFARIN SODIUM 2.5 MG/1
1 TABLET ORAL
Qty: 3 | Refills: 0
Start: 2023-08-23 | End: 2023-08-25

## 2023-08-23 RX ADMIN — HEPARIN SODIUM 1500 UNIT(S)/HR: 5000 INJECTION INTRAVENOUS; SUBCUTANEOUS at 07:06

## 2023-08-23 RX ADMIN — HEPARIN SODIUM 0 UNIT(S)/HR: 5000 INJECTION INTRAVENOUS; SUBCUTANEOUS at 08:46

## 2023-08-23 RX ADMIN — HEPARIN SODIUM 0 UNIT(S)/HR: 5000 INJECTION INTRAVENOUS; SUBCUTANEOUS at 08:44

## 2023-08-23 RX ADMIN — HEPARIN SODIUM 1500 UNIT(S)/HR: 5000 INJECTION INTRAVENOUS; SUBCUTANEOUS at 01:12

## 2023-08-23 RX ADMIN — TIOTROPIUM BROMIDE 2 PUFF(S): 18 CAPSULE ORAL; RESPIRATORY (INHALATION) at 08:18

## 2023-08-23 RX ADMIN — Medication 125 MICROGRAM(S): at 09:46

## 2023-08-23 RX ADMIN — Medication 1 TABLET(S): at 09:46

## 2023-08-23 RX ADMIN — CARVEDILOL PHOSPHATE 12.5 MILLIGRAM(S): 80 CAPSULE, EXTENDED RELEASE ORAL at 09:45

## 2023-08-23 RX ADMIN — Medication 0.5 MILLIGRAM(S): at 08:18

## 2023-08-23 RX ADMIN — Medication 81 MILLIGRAM(S): at 09:45

## 2023-08-23 RX ADMIN — Medication 1000 UNIT(S): at 09:46

## 2023-08-23 RX ADMIN — HEPARIN SODIUM 0 UNIT(S)/HR: 5000 INJECTION INTRAVENOUS; SUBCUTANEOUS at 00:04

## 2023-08-23 NOTE — PROGRESS NOTE ADULT - SUBJECTIVE AND OBJECTIVE BOX
87 y/o M with PMH chronic a-fib, CAD, pre-DM, dyslipidemia, DVT on coumadin, factor V leiden, cardiomyopathy, ADRY presents with bilateral lower extremity weakness. The pt started using CPAP recently and was unable to sleep. He was having difficulty walking  which prompted him to come to the ER.    PHYSICAL EXAM:    Vital Signs Last 24 Hrs  T(C): 36.7 (22 Aug 2023 22:15), Max: 36.7 (22 Aug 2023 22:15)  T(F): 98.1 (22 Aug 2023 22:15), Max: 98.1 (22 Aug 2023 22:15)  HR: 73 (22 Aug 2023 22:15) (73 - 86)  BP: 127/81 (22 Aug 2023 22:15) (124/83 - 127/81)  BP(mean): --  RR: 18 (22 Aug 2023 22:15) (18 - 18)  SpO2: 96% (22 Aug 2023 22:15) (93% - 96%)    Parameters below as of 22 Aug 2023 22:15  Patient On (Oxygen Delivery Method): room air          GENERAL: comfortable   HEAD:  Atraumatic, Normocephalic  EYES: EOMI, PERRLA, conjunctiva and sclera clear  HEENT: Moist mucous membranes  NECK: Supple, No JVD  NERVOUS SYSTEM:  Alert   CHEST/LUNG: Clear to auscultation bilaterally; No rales, rhonchi, wheezing, or rubs  HEART:S1S2 normal, no murmer  ABDOMEN: Soft, Nontender, Nondistended; Bowel sounds present  GENITOURINARY- Voiding, no palpable bladder  EXTREMITIES:  2+ Peripheral Pulses, No clubbing, cyanosis, or edema  MUSCULOSKELETAL No muscle tenderness, Muscle tone normal, No joint tenderness, no Joint swelling, Joint range of motion-normal  SKIN-no rash, no lesion  PSYCH- Mood stable  LYMPH Node- No palpable lymph node                          14.1   4.69  )-----------( 54       ( 23 Aug 2023 07:22 )             42.1     08-21    136  |  105  |  19  ----------------------------<  93  4.1   |  22  |  1.06    Ca    8.0<L>      21 Aug 2023 08:50          CAPILLARY BLOOD GLUCOSE        PT/INR - ( 22 Aug 2023 16:50 )   PT: 17.3 sec;   INR: 1.55 ratio         PTT - ( 22 Aug 2023 23:13 )  PTT:> 200 sec  Urinalysis Basic - ( 21 Aug 2023 08:50 )    Color: x / Appearance: x / SG: x / pH: x  Gluc: 93 mg/dL / Ketone: x  / Bili: x / Urobili: x   Blood: x / Protein: x / Nitrite: x   Leuk Esterase: x / RBC: x / WBC x   Sq Epi: x / Non Sq Epi: x / Bacteria: x        Culture - Blood (collected 20 Aug 2023 17:07)  Source: .Blood None  Preliminary Report (22 Aug 2023 22:02):    No growth at 48 Hours    Culture - Blood (collected 20 Aug 2023 17:07)  Source: .Blood None  Preliminary Report (22 Aug 2023 22:02):    No growth at 48 Hours      PT/INR - ( 22 Aug 2023 16:50 )   PT: 17.3 sec;   INR: 1.55 ratio         PTT - ( 22 Aug 2023 23:13 )  PTT:> 200 sec        A/P    1. Bilateral lower extremity weakness likely secondary to COVID 19   - Mucinex, albuterol, tessalon PRN       2. SIRS positive secondary to COVID         3. Chronic DVT   -ct heparin bridge with coumadin   -follow up INR today     4. Asymptomatic bacteruria       5. Subtherapeutic INR     6. History of chronic a-fib, CAD, pre-DM, dyslipidemia, DVT on coumadin, factor V leiden, cardiomyopathy, ADRY  - c/w home medications; verified with pt at the bedside    #D/C plan     
Patient is a 88y old  Male who presents with a chief complaint of Bilateral lower extremity weakness (21 Aug 2023 18:54)      HPI:  89 y/o M with PMH chronic a-fib, CAD, pre-DM, dyslipidemia, DVT on coumadin, factor V leiden, cardiomyopathy, ADRY presents with bilateral lower extremity weakness that started last night. The pt started using CPAP recently and was unable to sleep. He was having difficulty walking today which prompted him to come to the ER. Reports chest congestion, no other symptoms. Of note, pt has been experiencing lightheadeness and dizziness with standing since April. He was diagnosed with BPPV and encouraged to f/u with vestibular therapy outpt. Denies fevers, chills, chest pain, SOB, abdominal pain, N/V, diarrhea/constipation, slurred speech, numbness/tingling of the extremities, synocpe.      Prior admission:   - 4/18/23: Unsteady gait -> outpt vestibular PT     ER course: Temp 100.8F, HR . Labs: PLT 71, neutrophils 80.2%, glucose 142. UA: small leukocyte esterase, moderate blood, few bacteria. COVID positive. EKG: A fib with HR 98 bpm, PVCs, no ST changes (personally reviewed)    Imaging:   - Lower extremity doppler b/l: Nonocclusive deep venous thrombosis in the right popliteal and posterior tibial veins; thrombus in the popliteal vein has an appearance suggestive of chronic thrombus and the thrombus may all be chronic, however is of uncertain chronicity; comparison is recommended with prior imaging if available. No evidence of deep venous thrombosis in the left lower extremity.  - CT head: No acute hemorrhage, mass or mass effect.  - XR left knee: No acute findings.  - XR pelvis: No acute findings.  - CXR: No acute findings.    Pt was given Levaquin, 500 ml of NS, tylenol. He is being admitted to med/surg for further management.  (21 Aug 2023 02:57)      PAST MEDICAL & SURGICAL HISTORY:  Afib      DVT (deep venous thrombosis)      Hyperlipemia      H/O cardiomyopathy      Factor V Leiden      History of appendectomy          PREVIOUS DIAGNOSTIC TESTING:      MEDICATIONS  (STANDING):  aspirin enteric coated 81 milliGRAM(s) Oral daily  atorvastatin 40 milliGRAM(s) Oral at bedtime  buDESOnide    Inhalation Suspension 0.5 milliGRAM(s) Inhalation daily  carvedilol 12.5 milliGRAM(s) Oral every 12 hours  cholecalciferol 1000 Unit(s) Oral daily  digoxin     Tablet 125 MICROGram(s) Oral daily  lactobacillus acidophilus 1 Tablet(s) Oral every 12 hours  multivitamin 1 Tablet(s) Oral daily  tiotropium 2.5 MICROgram(s) Inhaler 2 Puff(s) Inhalation daily  warfarin 4 milliGRAM(s) Oral daily    MEDICATIONS  (PRN):  acetaminophen     Tablet .. 650 milliGRAM(s) Oral every 6 hours PRN Mild Pain (1 - 3)  acetaminophen     Tablet .. 650 milliGRAM(s) Oral every 6 hours PRN Temp greater or equal to 38C (100.4F), Mild Pain (1 - 3)  albuterol    90 MICROgram(s) HFA Inhaler 2 Puff(s) Inhalation every 6 hours PRN Bronchospasm  aluminum hydroxide/magnesium hydroxide/simethicone Suspension 30 milliLiter(s) Oral every 4 hours PRN Dyspepsia  benzonatate 100 milliGRAM(s) Oral three times a day PRN Cough  guaifenesin/dextromethorphan Oral Liquid 10 milliLiter(s) Oral every 4 hours PRN Cough  melatonin 3 milliGRAM(s) Oral at bedtime PRN Insomnia  ondansetron Injectable 4 milliGRAM(s) IV Push every 8 hours PRN Nausea and/or Vomiting      FAMILY HISTORY:  FH: diabetes mellitus    FH: breast cancer        SOCIAL HISTORY:  ***    REVIEW OF SYSTEM:  Pertinent items are noted in HPI.      Vital Signs Last 24 Hrs  T(C): 36.7 (22 Aug 2023 22:15), Max: 36.7 (22 Aug 2023 22:15)  T(F): 98.1 (22 Aug 2023 22:15), Max: 98.1 (22 Aug 2023 22:15)  HR: 73 (22 Aug 2023 22:15) (73 - 86)  BP: 127/81 (22 Aug 2023 22:15) (124/83 - 127/81)  BP(mean): --  RR: 18 (22 Aug 2023 22:15) (18 - 18)  SpO2: 96% (22 Aug 2023 22:15) (93% - 96%)    Parameters below as of 22 Aug 2023 22:15  Patient On (Oxygen Delivery Method): room air        I&O's Summary    PHYSICAL EXAM  General Appearance: cooperative, no acute distress,   HEENT: PERRL, conjunctiva clear, EOM's intact, non injected pharynx, no exudate, TM   normal  Neck: Supple, , no adenopathy, thyroid: not enlarged, no carotid bruit or JVD  Back: Symmetric, no  tenderness,no soft tissue tenderness  Lungs: Bibasilar rales, no wheeze  Heart: Regular rate and rhythm, S1, S2 normal, no murmur, rub or gallop  Abdomen: Soft, non-tender, bowel sounds active , no hepatosplenomegaly  Extremities: no cyanosis or edema, no joint swelling  Skin: Skin color, texture normal, no rashes   Neurologic: Alert and oriented X3 , cranial nerves intact, sensory and motor normal,    ECG:    LABS:                          13.3   5.64  )-----------( 48       ( 21 Aug 2023 08:50 )             40.9     08-21    136  |  105  |  19  ----------------------------<  93  4.1   |  22  |  1.06    Ca    8.0<L>      21 Aug 2023 08:50  Mg     2.4     08-20    TPro  7.6  /  Alb  3.9  /  TBili  0.9  /  DBili  x   /  AST  35  /  ALT  43  /  AlkPhos  51  08-20    CARDIAC MARKERS ( 20 Aug 2023 09:04 )  x     / x     / 216 U/L / x     / x              PT/INR - ( 21 Aug 2023 08:50 )   PT: 16.6 sec;   INR: 1.49 ratio         PTT - ( 21 Aug 2023 08:50 )  PTT:31.2 sec  Urinalysis Basic - ( 21 Aug 2023 08:50 )    Color: x / Appearance: x / SG: x / pH: x  Gluc: 93 mg/dL / Ketone: x  / Bili: x / Urobili: x   Blood: x / Protein: x / Nitrite: x   Leuk Esterase: x / RBC: x / WBC x   Sq Epi: x / Non Sq Epi: x / Bacteria: x            RADIOLOGY & ADDITIONAL STUDIES:  < from: Xray Chest 1 View- PORTABLE-Urgent (08.20.23 @ 10:13) >  INTERPRETATION:  Left knee, pelvis, and chest. Patient has left leg   weakness.    Left knee. 3 views.    Some arterial calcifications are noted.    No effusion is evident.    There are slight degenerative findings. No fracture.    Pelvis. There is a right hip replacement similar to December 20, 2021.    There is mild degeneration of the left hip. No visible fracture.    AP chest on August 20, 2023 at 10:00 AM.    Heart magnified by technique.    Lungs are grossly clear.    Chest is similar to April 15, 2023.    IMPRESSION: No acute findings.    < end of copied text >    
  87 y/o M with PMH chronic a-fib, CAD, pre-DM, dyslipidemia, DVT on coumadin, factor V leiden, cardiomyopathy, ADRY presents with bilateral lower extremity weakness. The pt started using CPAP recently and was unable to sleep. He was having difficulty walking  which prompted him to come to the ER.    PHYSICAL EXAM:    Vital Signs Last 24 Hrs  T(C): 36.5 (21 Aug 2023 16:03), Max: 37.7 (20 Aug 2023 19:44)  T(F): 97.7 (21 Aug 2023 16:03), Max: 99.8 (20 Aug 2023 19:44)  HR: 69 (21 Aug 2023 16:03) (69 - 90)  BP: 133/83 (21 Aug 2023 16:03) (100/57 - 133/83)  BP(mean): 89 (20 Aug 2023 19:44) (89 - 89)  RR: 18 (21 Aug 2023 16:03) (18 - 24)  SpO2: 96% (21 Aug 2023 16:03) (93% - 96%)    Parameters below as of 21 Aug 2023 16:03  Patient On (Oxygen Delivery Method): room air        GENERAL: comfortable   HEAD:  Atraumatic, Normocephalic  EYES: EOMI, PERRLA, conjunctiva and sclera clear  HEENT: Moist mucous membranes  NECK: Supple, No JVD  NERVOUS SYSTEM:  Alert   CHEST/LUNG: Clear to auscultation bilaterally; No rales, rhonchi, wheezing, or rubs  HEART:S1S2 normal, no murmer  ABDOMEN: Soft, Nontender, Nondistended; Bowel sounds present  GENITOURINARY- Voiding, no palpable bladder  EXTREMITIES:  2+ Peripheral Pulses, No clubbing, cyanosis, or edema  MUSCULOSKELETAL No muscle tenderness, Muscle tone normal, No joint tenderness, no Joint swelling, Joint range of motion-normal  SKIN-no rash, no lesion  PSYCH- Mood stable  LYMPH Node- No palpable lymph node    LABS:                        13.3   5.64  )-----------( 48       ( 21 Aug 2023 08:50 )             40.9     08-21    136  |  105  |  19  ----------------------------<  93  4.1   |  22  |  1.06    Ca    8.0<L>      21 Aug 2023 08:50  Mg     2.4     08-20    TPro  7.6  /  Alb  3.9  /  TBili  0.9  /  DBili  x   /  AST  35  /  ALT  43  /  AlkPhos  51  08-20    PT/INR - ( 21 Aug 2023 08:50 )   PT: 16.6 sec;   INR: 1.49 ratio         PTT - ( 21 Aug 2023 08:50 )  PTT:31.2 sec  Urinalysis Basic - ( 21 Aug 2023 08:50 )    Color: x / Appearance: x / SG: x / pH: x  Gluc: 93 mg/dL / Ketone: x  / Bili: x / Urobili: x   Blood: x / Protein: x / Nitrite: x   Leuk Esterase: x / RBC: x / WBC x   Sq Epi: x / Non Sq Epi: x / Bacteria: x        CAPILLARY BLOOD GLUCOSE          CARDIAC MARKERS ( 20 Aug 2023 09:04 )  x     / x     / 216 U/L / x     / x            Standing medicine  acetaminophen     Tablet .. 650 milliGRAM(s) Oral every 6 hours PRN  acetaminophen     Tablet .. 650 milliGRAM(s) Oral every 6 hours PRN  albuterol    90 MICROgram(s) HFA Inhaler 2 Puff(s) Inhalation every 6 hours PRN  aluminum hydroxide/magnesium hydroxide/simethicone Suspension 30 milliLiter(s) Oral every 4 hours PRN  aspirin enteric coated 81 milliGRAM(s) Oral daily  atorvastatin 40 milliGRAM(s) Oral at bedtime  benzonatate 100 milliGRAM(s) Oral three times a day PRN  buDESOnide    Inhalation Suspension 0.5 milliGRAM(s) Inhalation daily  carvedilol 12.5 milliGRAM(s) Oral every 12 hours  cholecalciferol 1000 Unit(s) Oral daily  digoxin     Tablet 125 MICROGram(s) Oral daily  guaifenesin/dextromethorphan Oral Liquid 10 milliLiter(s) Oral every 4 hours PRN  lactobacillus acidophilus 1 Tablet(s) Oral every 12 hours  melatonin 3 milliGRAM(s) Oral at bedtime PRN  multivitamin 1 Tablet(s) Oral daily  ondansetron Injectable 4 milliGRAM(s) IV Push every 8 hours PRN  tiotropium 2.5 MICROgram(s) Inhaler 2 Puff(s) Inhalation daily  warfarin 4 milliGRAM(s) Oral daily      A/P    1. Bilateral lower extremity weakness likely secondary to COVID 19   - Mucinex, albuterol, tessalon PRN   - SpO2 96% on room air -> no indication for Remdesivir or Decadron at this time   - Monitor SpO2 closely   - PT evaluation     2. SIRS positive secondary to COVID   - Temp 100.8F, HR   - s/p Levaquin; monitor off further abx   - f/u UCx, BCx x 2, procalcitonin; adjust abx based on sensitivities  - Trend WBC, monitor for temperatures   - Tylenol for temperatures PRN   - s/p 500 ml of NS, monitor off additional IVF     3. Chronic DVT   - US: Nonocclusive deep venous thrombosis in the right popliteal and posterior tibial veins; thrombus in the popliteal vein has an appearance suggestive of chronic thrombus and the thrombus may all be chronic, however is of uncertain chronicity; comparison is recommended with prior imaging if available.  - f/u with pt's PCP to obtain prior reports   - DVT appears to be chronic     4. Asymptomatic bacteruria   - UA: small leukocyte esterase, moderate blood, few bacteria  - f/u UCx, if positive will treat with antibiotics     5. Subtherapeutic INR   - INR 1.56, will give pt 2 mg of Warfarin today   - f/u AM PT/PTT/INR   - May need to adjust dose based on INR     6. History of chronic a-fib, CAD, pre-DM, dyslipidemia, DVT on coumadin, factor V leiden, cardiomyopathy, ADRY  - c/w home medications; verified with pt at the bedside  - PLT 71, glucose 142 -> f/u HbA1c     
  89 y/o M with PMH chronic a-fib, CAD, pre-DM, dyslipidemia, DVT on coumadin, factor V leiden, cardiomyopathy, ADRY presents with bilateral lower extremity weakness. The pt started using CPAP recently and was unable to sleep. He was having difficulty walking  which prompted him to come to the ER.    PHYSICAL EXAM:    Vital Signs Last 24 Hrs  T(C): 36.5 (22 Aug 2023 16:00), Max: 36.6 (22 Aug 2023 07:50)  T(F): 97.7 (22 Aug 2023 16:00), Max: 97.9 (22 Aug 2023 07:50)  HR: 86 (22 Aug 2023 16:00) (69 - 86)  BP: 124/83 (22 Aug 2023 16:00) (111/77 - 133/83)  BP(mean): --  RR: 18 (22 Aug 2023 16:00) (18 - 18)  SpO2: 93% (22 Aug 2023 16:00) (93% - 96%)    Parameters below as of 22 Aug 2023 16:00  Patient On (Oxygen Delivery Method): room air        GENERAL: comfortable   HEAD:  Atraumatic, Normocephalic  EYES: EOMI, PERRLA, conjunctiva and sclera clear  HEENT: Moist mucous membranes  NECK: Supple, No JVD  NERVOUS SYSTEM:  Alert   CHEST/LUNG: Clear to auscultation bilaterally; No rales, rhonchi, wheezing, or rubs  HEART:S1S2 normal, no murmer  ABDOMEN: Soft, Nontender, Nondistended; Bowel sounds present  GENITOURINARY- Voiding, no palpable bladder  EXTREMITIES:  2+ Peripheral Pulses, No clubbing, cyanosis, or edema  MUSCULOSKELETAL No muscle tenderness, Muscle tone normal, No joint tenderness, no Joint swelling, Joint range of motion-normal  SKIN-no rash, no lesion  PSYCH- Mood stable  LYMPH Node- No palpable lymph node                          13.3   5.64  )-----------( 48       ( 21 Aug 2023 08:50 )             40.9     08-21    136  |  105  |  19  ----------------------------<  93  4.1   |  22  |  1.06    Ca    8.0<L>      21 Aug 2023 08:50          CAPILLARY BLOOD GLUCOSE        PT/INR - ( 22 Aug 2023 13:11 )   PT: 17.4 sec;   INR: 1.56 ratio         PTT - ( 21 Aug 2023 08:50 )  PTT:31.2 sec  Urinalysis Basic - ( 21 Aug 2023 08:50 )    Color: x / Appearance: x / SG: x / pH: x  Gluc: 93 mg/dL / Ketone: x  / Bili: x / Urobili: x   Blood: x / Protein: x / Nitrite: x   Leuk Esterase: x / RBC: x / WBC x   Sq Epi: x / Non Sq Epi: x / Bacteria: x        Culture - Blood (collected 20 Aug 2023 17:07)  Source: .Blood None  Preliminary Report (21 Aug 2023 22:02):    No growth at 24 hours    Culture - Blood (collected 20 Aug 2023 17:07)  Source: .Blood None  Preliminary Report (21 Aug 2023 22:02):    No growth at 24 hours      PT/INR - ( 22 Aug 2023 13:11 )   PT: 17.4 sec;   INR: 1.56 ratio         PTT - ( 21 Aug 2023 08:50 )  PTT:31.2 sec        A/P    1. Bilateral lower extremity weakness likely secondary to COVID 19   - Mucinex, albuterol, tessalon PRN   - SpO2 96% on room air -> no indication for Remdesivir or Decadron at this time   - Monitor SpO2 closely   - PT evaluation     2. SIRS positive secondary to COVID   - Temp 100.8F, HR   - s/p Levaquin; monitor off further abx   - f/u UCx, BCx x 2, procalcitonin; adjust abx based on sensitivities    3. Chronic DVT   - US: Nonocclusive deep venous thrombosis in the right popliteal and posterior tibial veins; thrombus in the popliteal vein has an appearance suggestive of chronic thrombus and the thrombus may all be chronic, however is of uncertain chronicity; comparison is recommended with prior imaging if available.  - f/u with pt's PCP to obtain prior reports   - DVT appears to be chronic   -considering covid status, dropping platelet probably he is at high risk of developing clott and its consequences- Discussed with pt about need to start heparin drip and monitor him closely. Pt agrees with plan   -start heparin drip and monitor closely his platelet and h/h    4. Asymptomatic bacteruria       5. Subtherapeutic INR     6. History of chronic a-fib, CAD, pre-DM, dyslipidemia, DVT on coumadin, factor V leiden, cardiomyopathy, ADRY  - c/w home medications; verified with pt at the bedside  - PLT 71, glucose 142 -> f/u HbA1c

## 2023-08-23 NOTE — DISCHARGE NOTE PROVIDER - NSDCFUSCHEDAPPT_GEN_ALL_CORE_FT
Peyton Vargas  [facility]  CARDIOLOGY [address]  Scheduled Appointment: 08/31/2023    [facility]  CARDIOLOGY [address]  Scheduled Appointment: 09/07/2023    [facility]  CARDIOLOGY [address]  Scheduled Appointment: 09/08/2023

## 2023-08-23 NOTE — DISCHARGE NOTE PROVIDER - NSDCCPCAREPLAN_GEN_ALL_CORE_FT
PRINCIPAL DISCHARGE DIAGNOSIS  Diagnosis: Bilateral leg weakness  Assessment and Plan of Treatment:

## 2023-08-23 NOTE — DISCHARGE NOTE PROVIDER - HOSPITAL COURSE
89 y/o M with PMH chronic a-fib, CAD, pre-DM, dyslipidemia, DVT on coumadin, factor V leiden, cardiomyopathy, ADRY presents with bilateral lower extremity weakness     #Etiology appears to be due to COVID 19 infection. He is being discharged with further management as an outpt, time spent 45 minutes     #For his INR I discussed with pt need for follow up on his INR tomorrow or day after tomorrow for ongoing monitoring and management       Vital Signs Last 24 Hrs  T(C): 36.5 (23 Aug 2023 08:45), Max: 36.7 (22 Aug 2023 22:15)  T(F): 97.7 (23 Aug 2023 08:45), Max: 98.1 (22 Aug 2023 22:15)  HR: 79 (23 Aug 2023 08:45) (72 - 86)  BP: 135/78 (23 Aug 2023 08:45) (124/83 - 135/78)  BP(mean): --  RR: 18 (23 Aug 2023 08:45) (18 - 18)  SpO2: 95% (23 Aug 2023 08:45) (93% - 96%)    Parameters below as of 23 Aug 2023 08:45  Patient On (Oxygen Delivery Method): room air      T(C): 36.5 (08-23-23 @ 08:45), Max: 36.7 (08-22-23 @ 22:15)  HR: 79 (08-23-23 @ 08:45) (72 - 86)  BP: 135/78 (08-23-23 @ 08:45) (124/83 - 135/78)  RR: 18 (08-23-23 @ 08:45) (18 - 18)  SpO2: 95% (08-23-23 @ 08:45) (93% - 96%)    CONSTITUTIONAL: Well groomed, no apparent distress  EYES: PERRLA and symmetric, EOMI, No conjunctival or scleral injection, non-icteric  ENMT: Oral mucosa with moist membranes. Normal dentition; no pharyngeal injection or exudates             NECK: Supple, symmetric and without tracheal deviation   RESP: AEEB, b/l crackles present   CV: RRR, +S1S2, no MRG; no JVD; no peripheral edema  GI: Soft, NT, ND, no rebound, no guarding; no palpable masses; no hepatosplenomegaly; no hernia palpated  LYMPH: No cervical LAD or tenderness; no axillary LAD or tenderness; no inguinal LAD or tenderness           SKIN: No rashes or ulcers noted; no subcutaneous nodules or induration palpable  NEURO: CN II-XII intact; normal reflexes in upper and lower extremities, sensation intact in upper and lower extremities b/l to light touch   PSYCH: Appropriate insight/judgment; A+O x 3, mood and affect appropriate, recent/remote memory intact  PT/INR - ( 23 Aug 2023 07:22 )   PT: 24.4 sec;   INR: 2.21 ratio         PTT - ( 23 Aug 2023 07:22 )  PTT:>200.0 sec    08-23-23 @ 13:24   87 y/o M with PMH chronic a-fib, CAD, pre-DM, dyslipidemia, DVT on coumadin, factor V leiden, cardiomyopathy, ADRY presents with bilateral lower extremity weakness     #Etiology appears to be due to COVID 19 infection. He is being discharged with further management as an outpt, time spent 45 minutes     #For his INR I discussed with pt need for follow up on his INR tomorrow or day after tomorrow for ongoing monitoring and management     #pt has chronic stable systolic heart failure -stable, ct home meds     Vital Signs Last 24 Hrs  T(C): 36.5 (23 Aug 2023 08:45), Max: 36.7 (22 Aug 2023 22:15)  T(F): 97.7 (23 Aug 2023 08:45), Max: 98.1 (22 Aug 2023 22:15)  HR: 79 (23 Aug 2023 08:45) (72 - 86)  BP: 135/78 (23 Aug 2023 08:45) (124/83 - 135/78)  BP(mean): --  RR: 18 (23 Aug 2023 08:45) (18 - 18)  SpO2: 95% (23 Aug 2023 08:45) (93% - 96%)    Parameters below as of 23 Aug 2023 08:45  Patient On (Oxygen Delivery Method): room air      T(C): 36.5 (08-23-23 @ 08:45), Max: 36.7 (08-22-23 @ 22:15)  HR: 79 (08-23-23 @ 08:45) (72 - 86)  BP: 135/78 (08-23-23 @ 08:45) (124/83 - 135/78)  RR: 18 (08-23-23 @ 08:45) (18 - 18)  SpO2: 95% (08-23-23 @ 08:45) (93% - 96%)    CONSTITUTIONAL: Well groomed, no apparent distress  EYES: PERRLA and symmetric, EOMI, No conjunctival or scleral injection, non-icteric  ENMT: Oral mucosa with moist membranes. Normal dentition; no pharyngeal injection or exudates             NECK: Supple, symmetric and without tracheal deviation   RESP: AEEB, b/l crackles present   CV: RRR, +S1S2, no MRG; no JVD; no peripheral edema  GI: Soft, NT, ND, no rebound, no guarding; no palpable masses; no hepatosplenomegaly; no hernia palpated  LYMPH: No cervical LAD or tenderness; no axillary LAD or tenderness; no inguinal LAD or tenderness           SKIN: No rashes or ulcers noted; no subcutaneous nodules or induration palpable  NEURO: CN II-XII intact; normal reflexes in upper and lower extremities, sensation intact in upper and lower extremities b/l to light touch   PSYCH: Appropriate insight/judgment; A+O x 3, mood and affect appropriate, recent/remote memory intact  PT/INR - ( 23 Aug 2023 07:22 )   PT: 24.4 sec;   INR: 2.21 ratio         PTT - ( 23 Aug 2023 07:22 )  PTT:>200.0 sec    08-23-23 @ 13:24

## 2023-08-23 NOTE — DISCHARGE NOTE PROVIDER - NSDCMRMEDTOKEN_GEN_ALL_CORE_FT
albuterol 90 mcg/inh inhalation aerosol: 2 puff(s) inhaled every 6 hours As needed Bronchospasm  Asmanex Twisthaler 60 Dose 220 mcg/inh inhalation aerosol powder: 1 puff(s) inhaled 2 times a day  Aspirin Enteric Coated 81 mg oral delayed release tablet: 1 tab(s) orally once a day  budesonide 0.5 mg/2 mL inhalation suspension: 2 milliliter(s) by nebulizer once a day  carvedilol 12.5 mg oral tablet: 1 tab(s) orally 2 times a day  cholecalciferol 25 mcg (1000 intl units) oral tablet: 1 tab(s) orally once a day  digoxin 125 mcg (0.125 mg) oral tablet: 1 tab(s) orally once a day  ipratropium-albuterol 0.5 mg-2.5 mg/3 mL inhalation solution: 3 milliliter(s) by nebulizer once a day  Multiple Vitamins oral tablet: 1 tab(s) orally once a day  Probiotic Formula oral capsule: 1 cap(s) orally once a day  Proventil HFA 90 mcg/inh inhalation aerosol: 2 puff(s) inhaled every 6 hours as needed for  bronchospasm  rosuvastatin 10 mg oral tablet: 1 tab(s) orally once a day  Super B Complex oral tablet: 1 tab(s) orally once a day  tiotropium 2.5 mcg/inh inhalation aerosol: 2 puff(s) inhaled once a day  warfarin 5 mg oral tablet: 1 tab(s) orally once a day (at bedtime)

## 2023-08-23 NOTE — DISCHARGE NOTE PROVIDER - CARE PROVIDER_API CALL
Arina Javed.  Internal Medicine  [address] [place] [address]  Phone: [phone]  Fax: [phone]  Follow Up Time: 1 week

## 2023-08-23 NOTE — DISCHARGE NOTE NURSING/CASE MANAGEMENT/SOCIAL WORK - PATIENT PORTAL LINK FT
You can access the FollowMyHealth Patient Portal offered by [facility] by registering at the following website: [email or web address]. By joining [facility]’s FollowMyHealth portal, you will also be able to view your health information using other applications (apps) compatible with our system.

## 2023-08-23 NOTE — CDI QUERY NOTE - NSCDIOTHERTXTBX_GEN_ALL_CORE_HH
Clinical documentation indicates that this patient has CHF.      Please include more specific documentation regarding the type of Heart Failure in your Progress Note and/or Discharge Summary.    - Chronic Systolic (HFrEF)  - Chronic Diastolic (HFpEF)  - Cardiomyopathy only  - Other (specify)    SUPPORTING DOCUMENTATION AND/OR CLINICAL EVIDENCE:    TTE Echo Complete w/o Contrast w/ Doppler (04.18.23 @ 08:58) Left ventricle systolic function appears impaired in the presence of a  cardiac arrhythmia. Visual estimation of left ventricle ejection fraction is 45-50 %.    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

## 2023-08-23 NOTE — PROGRESS NOTE ADULT - ASSESSMENT
87 y/o M with PMH chronic a-fib, CAD, pre-DM, dyslipidemia, DVT on coumadin, factor V leiden, cardiomyopathy, ADRY presents with bilateral lower extremity weakness that started last night. The pt started using CPAP recently and was unable to sleep. He was having difficulty walking today which prompted him to come to the ER. Reports chest congestion, no other symptoms. Of note, pt has been experiencing lightheadeness and dizziness with standing since April. He was diagnosed with BPPV and encouraged to f/u with vestibular therapy outpt. Denies fevers, chills, chest pain, SOB, abdominal pain, N/V, diarrhea/constipation, slurred speech, numbness/tingling of the extremities, synocpe.      Prior admission:   - 4/18/23: Unsteady gait -> outpt vestibular PT     ER course: Temp 100.8F, HR . Labs: PLT 71, neutrophils 80.2%, glucose 142. UA: small leukocyte esterase, moderate blood, few bacteria. COVID positive. EKG: A fib with HR 98 bpm, PVCs, no ST changes (personally reviewed)    Imaging:   - Lower extremity doppler b/l: Nonocclusive deep venous thrombosis in the right popliteal and posterior tibial veins; thrombus in the popliteal vein has an appearance suggestive of chronic thrombus and the thrombus may all be chronic, however is of uncertain chronicity; comparison is recommended with prior imaging if available. No evidence of deep venous thrombosis in the left lower extremity.  - CT head: No acute hemorrhage, mass or mass effect.  - XR left knee: No acute findings.  - XR pelvis: No acute findings.  - CXR: No acute findings.    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

## 2023-08-23 NOTE — CDI QUERY NOTE - NSCDIOTHERTXTBX2_GEN_ALL_CORE_FT
This patient was admitted with SIRS (Systemic inflammatory response syndrome) criteria with a heart rate of 108, a respiratory rate of 24 and an infectious source of COVID-19.     Can you please clarify if the above criteria is indicative of viral sepsis or SIRS alone?     - Viral sepsis   - SIRS only, no sepsis   - Other (please specify)     Supporting documentation:     HP Adult 8/21/2023   T(F): 98.1 (20 Aug 2023 23:21), Max: 100.8 (20 Aug 2023 11:39)  HR: 82 (20 Aug 2023 23:21) (80 - 108)  RR: 18 (20 Aug 2023 23:21) (18 - 24)  SIRS positive secondary to COVID   - Temp 100.8F, HR     Adult-Hospitalist progress note 8/23/2023   SIRS positive secondary to COVID

## 2023-08-23 NOTE — PROGRESS NOTE ADULT - REASON FOR ADMISSION
Bilateral lower extremity weakness

## 2023-08-24 ENCOUNTER — LABORATORY RESULT (OUTPATIENT)
Age: 88
End: 2023-08-24

## 2023-08-24 RX ORDER — FLUTICASONE PROPIONATE AND SALMETEROL 50; 250 UG/1; UG/1
1 POWDER ORAL; RESPIRATORY (INHALATION)
Qty: 1 | Refills: 0
Start: 2023-08-24 | End: 2023-09-22

## 2023-08-25 ENCOUNTER — RX RENEWAL (OUTPATIENT)
Age: 88
End: 2023-08-25

## 2023-08-25 LAB
CULTURE RESULTS: SIGNIFICANT CHANGE UP
CULTURE RESULTS: SIGNIFICANT CHANGE UP
INR PPP: 2.21
PROTHROMBIN TIME COMMENT: NORMAL
PT BLD: 24.4
SPECIMEN SOURCE: SIGNIFICANT CHANGE UP
SPECIMEN SOURCE: SIGNIFICANT CHANGE UP

## 2023-08-28 ENCOUNTER — APPOINTMENT (OUTPATIENT)
Dept: CARDIOLOGY | Facility: CLINIC | Age: 88
End: 2023-08-28
Payer: MEDICARE

## 2023-08-28 VITALS
HEIGHT: 73 IN | WEIGHT: 215 LBS | HEART RATE: 79 BPM | DIASTOLIC BLOOD PRESSURE: 60 MMHG | BODY MASS INDEX: 28.49 KG/M2 | OXYGEN SATURATION: 94 % | SYSTOLIC BLOOD PRESSURE: 124 MMHG

## 2023-08-28 DIAGNOSIS — Z88.0 ALLERGY STATUS TO PENICILLIN: ICD-10-CM

## 2023-08-28 DIAGNOSIS — I50.22 CHRONIC SYSTOLIC (CONGESTIVE) HEART FAILURE: ICD-10-CM

## 2023-08-28 DIAGNOSIS — I82.541 CHRONIC EMBOLISM AND THROMBOSIS OF RIGHT TIBIAL VEIN: ICD-10-CM

## 2023-08-28 DIAGNOSIS — D68.59 OTHER PRIMARY THROMBOPHILIA: ICD-10-CM

## 2023-08-28 DIAGNOSIS — R73.03 PREDIABETES: ICD-10-CM

## 2023-08-28 DIAGNOSIS — I48.20 CHRONIC ATRIAL FIBRILLATION, UNSPECIFIED: ICD-10-CM

## 2023-08-28 DIAGNOSIS — Z79.01 LONG TERM (CURRENT) USE OF ANTICOAGULANTS: ICD-10-CM

## 2023-08-28 DIAGNOSIS — E78.5 HYPERLIPIDEMIA, UNSPECIFIED: ICD-10-CM

## 2023-08-28 DIAGNOSIS — U07.1 COVID-19: ICD-10-CM

## 2023-08-28 DIAGNOSIS — I82.531 CHRONIC EMBOLISM AND THROMBOSIS OF RIGHT POPLITEAL VEIN: ICD-10-CM

## 2023-08-28 DIAGNOSIS — Z79.82 LONG TERM (CURRENT) USE OF ASPIRIN: ICD-10-CM

## 2023-08-28 DIAGNOSIS — I25.10 ATHEROSCLEROTIC HEART DISEASE OF NATIVE CORONARY ARTERY WITHOUT ANGINA PECTORIS: ICD-10-CM

## 2023-08-28 DIAGNOSIS — Z09 ENCOUNTER FOR FOLLOW-UP EXAMINATION AFTER COMPLETED TREATMENT FOR CONDITIONS OTHER THAN MALIGNANT NEOPLASM: ICD-10-CM

## 2023-08-28 DIAGNOSIS — D68.51 ACTIVATED PROTEIN C RESISTANCE: ICD-10-CM

## 2023-08-28 PROCEDURE — 93000 ELECTROCARDIOGRAM COMPLETE: CPT

## 2023-08-28 PROCEDURE — 99214 OFFICE O/P EST MOD 30 MIN: CPT

## 2023-08-29 NOTE — PHYSICAL EXAM
[Normal S1, S2] : normal S1, S2 [Soft] : abdomen soft [Non Tender] : non-tender [No Edema] : no edema [Normal] : moves all extremities, no focal deficits, normal speech [Alert and Oriented] : alert and oriented [de-identified] : use of walker

## 2023-08-29 NOTE — DISCUSSION/SUMMARY
[EKG obtained to assist in diagnosis and management of assessed problem(s)] : EKG obtained to assist in diagnosis and management of assessed problem(s) [FreeTextEntry1] : Here today post hospital follow up admitted with B/L LE weakness diagnosed with COVID, currently reports improvement in his symptoms However, has now developed a cough which reports he will be seeing Pulmonary today. Offers no CP/SOB  Echo scheduled for next week Will R/S stress test until cleared by Pulmonary    AF: INR 4.4 will hold x 2 days and repeat INR Wednesday before resuming AC   Plan DW patient/wife

## 2023-08-29 NOTE — HISTORY OF PRESENT ILLNESS
[FreeTextEntry1] :  89 y/o male PMH: chronic a-fib, CAD moderate LAD disease 2020, pre-DM, dyslipidemia, DVT on coumadin, cardiomyopathy, ADRY here today post Hospiital follow up admitted with weakness diagnosed with COVID is followed with Pulmonary, INR today supratherapeutic    Cardiac testing/medications reviewed  Cardiac Cath 2/14/20; Moderate LAD disease 60% stenosis unchanged from 2016, NL LV FX Mild AS ( RCA 30% stenosis )   Echo; 5/16/23 EF 50-55%, Moderate AS, Moderate-severely dilated LA, RA dilated, Mild MR Aortic Valve appears moderately calcified.

## 2023-08-30 ENCOUNTER — LABORATORY RESULT (OUTPATIENT)
Age: 88
End: 2023-08-30

## 2023-09-05 ENCOUNTER — NON-APPOINTMENT (OUTPATIENT)
Age: 88
End: 2023-09-05

## 2023-09-05 LAB
INR PPP: 4.93 RATIO
PT BLD: 53.2 SEC

## 2023-09-06 ENCOUNTER — TRANSCRIPTION ENCOUNTER (OUTPATIENT)
Age: 88
End: 2023-09-06

## 2023-09-06 ENCOUNTER — LABORATORY RESULT (OUTPATIENT)
Age: 88
End: 2023-09-06

## 2023-09-07 ENCOUNTER — APPOINTMENT (OUTPATIENT)
Dept: CARDIOLOGY | Facility: CLINIC | Age: 88
End: 2023-09-07

## 2023-09-08 ENCOUNTER — APPOINTMENT (OUTPATIENT)
Dept: CARDIOLOGY | Facility: CLINIC | Age: 88
End: 2023-09-08
Payer: MEDICARE

## 2023-09-08 PROCEDURE — 93306 TTE W/DOPPLER COMPLETE: CPT

## 2023-09-11 ENCOUNTER — LABORATORY RESULT (OUTPATIENT)
Age: 88
End: 2023-09-11

## 2023-09-14 NOTE — ED PROVIDER NOTE - DISPOSITION TYPE
Vital Signs Last 12 Hrs  T(F): 98.1 (09-14-23 @ 21:30), Max: 98.3 (09-14-23 @ 19:21)  HR: 79 (09-14-23 @ 21:30) (61 - 79)  BP: 165/107 (09-14-23 @ 21:30) (165/107 - 172/106)  BP(mean): --  RR: 17 (09-14-23 @ 21:30) (17 - 18)  SpO2: 98% (09-14-23 @ 21:30) (97% - 99%)    PHYSICAL EXAM:  Constitutional: NAD, comfortable in bed.  HEENT: NC/AT, PERRLA, EOMI, no conjunctival pallor or scleral icterus, MMM  Neck: Supple, no JVD  Respiratory: CTA B/L. No w/r/r.   Cardiovascular: RRR, normal S1 and S2, no m/r/g.   Gastrointestinal: +BS, soft NTND, no guarding or rebound tenderness, no palpable masses   Extremities: wwp; no cyanosis, clubbing or edema.   Vascular: Pulses equal and strong throughout.   Neurological: AAOx3, no CN deficits, strength and sensation intact throughout.   Skin: No gross skin abnormalities or rashes Vital Signs Last 12 Hrs  T(F): 98.1 (09-14-23 @ 21:30), Max: 98.3 (09-14-23 @ 19:21)  HR: 79 (09-14-23 @ 21:30) (61 - 79)  BP: 165/107 (09-14-23 @ 21:30) (165/107 - 172/106)  BP(mean): --  RR: 17 (09-14-23 @ 21:30) (17 - 18)  SpO2: 98% (09-14-23 @ 21:30) (97% - 99%)    PHYSICAL EXAM:  Constitutional: NAD, comfortable in bed.  HEENT: NC/AT, PERRLA, EOMI, no conjunctival pallor or scleral icterus, MMM  Neck: Supple, no JVD  Respiratory: CTA B/L. No w/r/r.   Cardiovascular: RRR, normal S1 and S2, no m/r/g.   Gastrointestinal: +BS, soft NTND, no guarding or rebound tenderness, no palpable masses   Extremities:3+ pitting edema b/l up to mid thigh. no ue swelling. no redness or skin changes to LE  Vascular: Pulses equal and strong throughout.   Neurological: AAOx3, no CN deficits, strength and sensation intact throughout.   Skin: No gross skin abnormalities or rashes ADMIT

## 2023-09-18 ENCOUNTER — LABORATORY RESULT (OUTPATIENT)
Age: 88
End: 2023-09-18

## 2023-10-01 PROBLEM — R40.4 EPISODE OF UNRESPONSIVENESS: Status: ACTIVE | Noted: 2018-05-03

## 2023-10-16 ENCOUNTER — LABORATORY RESULT (OUTPATIENT)
Age: 88
End: 2023-10-16

## 2023-11-13 ENCOUNTER — LABORATORY RESULT (OUTPATIENT)
Age: 88
End: 2023-11-13

## 2023-11-14 ENCOUNTER — LABORATORY RESULT (OUTPATIENT)
Age: 88
End: 2023-11-14

## 2023-11-28 ENCOUNTER — TRANSCRIPTION ENCOUNTER (OUTPATIENT)
Age: 88
End: 2023-11-28

## 2023-12-11 ENCOUNTER — LABORATORY RESULT (OUTPATIENT)
Age: 88
End: 2023-12-11

## 2023-12-12 ENCOUNTER — LABORATORY RESULT (OUTPATIENT)
Age: 88
End: 2023-12-12

## 2023-12-12 ENCOUNTER — NON-APPOINTMENT (OUTPATIENT)
Age: 88
End: 2023-12-12

## 2023-12-20 ENCOUNTER — NON-APPOINTMENT (OUTPATIENT)
Age: 88
End: 2023-12-20

## 2023-12-27 ENCOUNTER — LABORATORY RESULT (OUTPATIENT)
Age: 88
End: 2023-12-27

## 2023-12-28 ENCOUNTER — LABORATORY RESULT (OUTPATIENT)
Age: 88
End: 2023-12-28

## 2023-12-29 ENCOUNTER — LABORATORY RESULT (OUTPATIENT)
Age: 88
End: 2023-12-29

## 2024-01-01 NOTE — PATIENT PROFILE ADULT - FLU SEASON?
Outgoing call:  10/7/24    CMOC called pt's mother Martha to follow up on documents needed for lanta. Martha verbalized to cmoc if an email can be resent with forms needed to be completed by her. CMOC sent such email. Martha also verbalized to CMOC she will send birth certificates and ss card numbers along with completed forms one available. CMOC to wait for said documents and follow up.     Next outreach scheduled for 10/16/24.   
No

## 2024-01-08 NOTE — ED PROVIDER NOTE - CARE PLAN
Is This A New Presentation, Or A Follow-Up?: Skin Lesion Is This A New Presentation, Or A Follow-Up?: Skin Lesions 1 Principal Discharge DX:	Pneumonia

## 2024-01-09 NOTE — ED PROVIDER NOTE - EKG #1 DATE/TIME
HISTORY AND PHYSICAL EXAM    ADMISSION DATE:  2021  ADMITTING PHYSICIAN:  Tasia Nolasco MD  ATTENDING PHYSICIAN:  Tasia Nolasco MD    CODE STATUS:  No Order    HISTORY OF PRESENT ILLNESS:  Leah Engel is a 29 year old  woman at 39w0d with estimated delivery date: 2021  who presents for SROM at 1300 with a large gush of fluid.     PRENATAL PROBLEM LIST:  -Asthma- albuterol prn  -s/p COVID , s/p vaccine x 2 doses  -fundal fibroid 10 x 14 cm @20 wks  -EFW 49% @37 wks (6#8)      PAST OB MEDICAL HISTORY:  OB History    Para Term  AB Living   1 0 0 0 0 0   SAB IAB Ectopic Molar Multiple Live Births   0 0 0 0 0 0   Obstetric Comments   fibroids            PAST MEDICAL HISTORY:  Past Medical History:   Diagnosis Date   • Asthma    • Fibroids    • History of COVID-19 2021       PAST SURGICAL HISTORY:  History reviewed. No pertinent surgical history.    CURRENT MEDICATIONS:  • sodium chloride (PF)  2 mL Intracatheter 2 times per day   • acetaminophen  650 mg Oral Q4H    Or   • acetaminophen  650 mg Rectal Q4H       ALLERGIES:  ALLERGIES:  No Known Allergies    SOCIAL HISTORY:  Social History     Tobacco Use   • Smoking status: Never Smoker   • Smokeless tobacco: Never Used   Vaping Use   • Vaping Use: never used   Substance Use Topics   • Alcohol use: Not Currently   • Drug use: Never       Sexually Active: Not Asked          Drug Use:    Never           Review of patient's social economics indicates:  No social economics status on file      FAMILY HISTORY:  Family History   Problem Relation Age of Onset   • Thyroid Mother    • Hypertension Father    • Diabetes Father    • Hyperlipidemia Father    • Bleeding Disorder Sister        REVIEW OF SYSTEMS:  Constitutional:  Denies Headache.  No fevers or chills.  Respiratory:  No breathing issues, cough or shortness of breath.  Cardiovascular:  No chest pain, SOB.   Gastrointestinal:  Denies nausea, vomiting, diarrhea, or constipation.  : see 
HPI.   All other review of systems negative.     PHYSICAL EXAM:  VITAL SIGNS:   Visit Vitals  /69   Pulse 86   Temp 97.9 °F (36.6 °C) (Oral)   Resp 16   Ht 5' 7\" (1.702 m)   Wt 98.9 kg   LMP 03/20/2021 (Approximate)   SpO2 99%   BMI 34.14 kg/m²     GENERAL: NAD, Awake, alert and oriented.  LUNGS: Breathing comfortably.   HEART: Regular rate  ABDOMEN: Soft, gravid, no guarding/rebound.  EXTREMITIES: Nontender.   : Normal external genitalia. Grossly ruptured with clear fluid    SVE: Not done    FETAL SURVEILLANCE  Baseline 120s-160s  Variability- moderate  Decelerations- none  Fetal Heart Tones Category: 1    [place]: q2 (pit at 8)    PRENATAL LABS  Blood Type: B+  Antibody: negative  Gonorrhea/Clamydia: neg  Hepatitis B Surface Antigen: neg  HIV: neg  Rubella: immune  Rapid Plasma Reagin (RPR): NR  One hour glucose tolerance test (GTT): Normal   Group B Strep: negative    INPATIENT LABS  Admission on 12/18/2021   Component Date Value Ref Range Status   • GBS 11/30/2021 Negative   Final   • Rapid SARS-COV-2 by PCR 12/18/2021 Not Detected  Not Detected / Detected / Presumptive Positive / Inhibitors present Final   • Isolation Guidelines 12/18/2021    Final    Do not use this test result as the sole decision-maker for discontinuation of isolation.   Clinical evaluation should be considered for other respiratory illness requiring transmission-based isolation.    -    No fever (<99.0 F/37.2 C) for at least 24 hours without the use of fever-reducing medications    AND  -    Respiratory symptoms have improved or resolved (e.g. cough, shortness of breath)     AND  -    COVID-19 negative test    See COVID-19 Deisolation Resource Guide   • Procedural Comment 12/18/2021    Final    SARS-CoV-2 nucleic acid has not been detected indicating the absence of COVID-19.       Testing was performed using the Opez Xpert Xpress SARS-CoV-2 RT-PCR assay that has been given Emergency Use Authorization (EUA) by the [place] Food 
and Drug Administration (FDA).  These results are considered definitive and do not need to be confirmed by another method.   • ABO/RH(D) 2021 B Rh Positive   Final   • ANTIBODY SCREEN 2021 Negative   Final   • TYPE AND SCREEN EXPIRATION DATE 2021 23:59   Final   • WBC 2021 9.2  4.2 - 11.0 K/mcL Final   • RBC 2021 3.67* 4.00 - 5.20 mil/mcL Final   • HGB 2021 10.9* 12.0 - 15.5 g/dL Final   • HCT 2021 32.8* 36.0 - 46.5 % Final   • MCV 2021 89.4  78.0 - 100.0 fl Final   • MCH 2021 29.7  26.0 - 34.0 pg Final   • MCHC 2021 33.2  32.0 - 36.5 g/dL Final   • PLT 2021 230  140 - 450 K/mcL Final   • RDW-CV 2021 14.0  11.0 - 15.0 % Final   • RDW-SD 2021 45.6  39.0 - 50.0 fL Final   • NRBC 2021 0  <=0 /100 WBC Final           ASSESSMENT AND PLAN:    Leah Engel is a 29 year old  woman at 39w0d with an estimated delivery date: 2021 who presents with SROM    PROM-not in labor. Pitocin started per protocol given was 1 cm and SROM. FHTs reassuring   -pitocin per protocol   -desires natural labor    -Asthma- albuterol prn    -s/p COVID , s/p vaccine x 2 doses    -fundal fibroid 10 x 14 cm @20 wks.    -bleeding precautions    -EFW 49% @37 wks (6#8)        Tasia Nolasco MD   
English
20-Aug-2023 08:06

## 2024-01-15 ENCOUNTER — LABORATORY RESULT (OUTPATIENT)
Age: 89
End: 2024-01-15

## 2024-02-15 ENCOUNTER — APPOINTMENT (OUTPATIENT)
Dept: CARDIOLOGY | Facility: CLINIC | Age: 89
End: 2024-02-15

## 2024-02-29 ENCOUNTER — APPOINTMENT (OUTPATIENT)
Dept: CARDIOLOGY | Facility: CLINIC | Age: 89
End: 2024-02-29
Payer: MEDICARE

## 2024-02-29 ENCOUNTER — NON-APPOINTMENT (OUTPATIENT)
Age: 89
End: 2024-02-29

## 2024-02-29 VITALS
SYSTOLIC BLOOD PRESSURE: 118 MMHG | HEIGHT: 73 IN | DIASTOLIC BLOOD PRESSURE: 74 MMHG | OXYGEN SATURATION: 95 % | HEART RATE: 84 BPM

## 2024-02-29 LAB
INR PPP: 1.7 RATIO
POCT-PROTHROMBIN TIME: 20.3 SECS
QUALITY CONTROL: YES

## 2024-02-29 PROCEDURE — G2211 COMPLEX E/M VISIT ADD ON: CPT

## 2024-02-29 PROCEDURE — 93000 ELECTROCARDIOGRAM COMPLETE: CPT

## 2024-02-29 PROCEDURE — 99214 OFFICE O/P EST MOD 30 MIN: CPT

## 2024-02-29 PROCEDURE — 85610 PROTHROMBIN TIME: CPT | Mod: QW

## 2024-02-29 RX ORDER — FLUTICASONE PROPIONATE 50 UG/1
50 SPRAY, METERED NASAL TWICE DAILY
Refills: 0 | Status: ACTIVE | COMMUNITY

## 2024-02-29 RX ORDER — GUAIFENESIN 600 MG/1
TABLET, EXTENDED RELEASE ORAL TWICE DAILY
Refills: 0 | Status: ACTIVE | COMMUNITY

## 2024-02-29 NOTE — HISTORY OF PRESENT ILLNESS
[FreeTextEntry1] :  88 y/o male PMH: chronic a-fib, CAD moderate LAD disease 2020, pre-DM, dyslipidemia, DVT on coumadin, cardiomyopathy, ADRY, COVID here today routine cardiac follow up   Cardiac testing/medications reviewed  Cardiac Cath 2/14/20; Moderate LAD disease 60% stenosis unchanged from 2016, NL LV FX Mild AS ( RCA 30% stenosis )  Echo; 5/16/23 EF 50-55%, Moderate AS, Moderate-severely dilated LA, RA dilated, Mild MR Aortic Valve appears moderately calcified.  Nuclear stress test

## 2024-02-29 NOTE — PHYSICAL EXAM
[Normal S1, S2] : normal S1, S2 [Soft] : abdomen soft [No Edema] : no edema [Non Tender] : non-tender [Normal] : moves all extremities, no focal deficits, normal speech [Alert and Oriented] : alert and oriented [de-identified] : use of walker

## 2024-02-29 NOTE — DISCUSSION/SUMMARY
[EKG obtained to assist in diagnosis and management of assessed problem(s)] : EKG obtained to assist in diagnosis and management of assessed problem(s) [FreeTextEntry1] : Here today routine follow up feeling well no complaints offered Has been congested with a dry cough is seeing Pulmonary tomorrow has been afebrile no SOB He will return for stress test when current illness fully resolves Will have a follow up Echo in May   Labs with PCP   OV 4 months  Plan DW patient/wife

## 2024-03-05 ENCOUNTER — RX RENEWAL (OUTPATIENT)
Age: 89
End: 2024-03-05

## 2024-03-07 ENCOUNTER — LABORATORY RESULT (OUTPATIENT)
Age: 89
End: 2024-03-07

## 2024-03-19 ENCOUNTER — LABORATORY RESULT (OUTPATIENT)
Age: 89
End: 2024-03-19

## 2024-04-03 ENCOUNTER — LABORATORY RESULT (OUTPATIENT)
Age: 89
End: 2024-04-03

## 2024-04-17 ENCOUNTER — LABORATORY RESULT (OUTPATIENT)
Age: 89
End: 2024-04-17

## 2024-04-18 ENCOUNTER — LABORATORY RESULT (OUTPATIENT)
Age: 89
End: 2024-04-18

## 2024-05-10 ENCOUNTER — APPOINTMENT (OUTPATIENT)
Dept: NEUROLOGY | Facility: CLINIC | Age: 89
End: 2024-05-10
Payer: MEDICARE

## 2024-05-10 VITALS
BODY MASS INDEX: 28.49 KG/M2 | HEART RATE: 72 BPM | DIASTOLIC BLOOD PRESSURE: 94 MMHG | HEIGHT: 73 IN | TEMPERATURE: 97.8 F | WEIGHT: 215 LBS | SYSTOLIC BLOOD PRESSURE: 135 MMHG

## 2024-05-10 DIAGNOSIS — G91.2 (IDIOPATHIC) NORMAL PRESSURE HYDROCEPHALUS: ICD-10-CM

## 2024-05-10 DIAGNOSIS — G62.9 POLYNEUROPATHY, UNSPECIFIED: ICD-10-CM

## 2024-05-10 DIAGNOSIS — R42 DIZZINESS AND GIDDINESS: ICD-10-CM

## 2024-05-10 PROCEDURE — 99214 OFFICE O/P EST MOD 30 MIN: CPT

## 2024-05-10 PROCEDURE — G2211 COMPLEX E/M VISIT ADD ON: CPT

## 2024-05-10 RX ORDER — MULTIVIT-MIN/FOLIC/VIT K/LYCOP 400-300MCG
25 MCG TABLET ORAL DAILY
Refills: 0 | Status: ACTIVE | COMMUNITY

## 2024-05-10 RX ORDER — IPRATROPIUM BROMIDE AND ALBUTEROL SULFATE 2.5; .5 MG/3ML; MG/3ML
0.5-2.5 (3) SOLUTION RESPIRATORY (INHALATION) DAILY
Refills: 0 | Status: ACTIVE | COMMUNITY

## 2024-05-10 RX ORDER — ENALAPRIL MALEATE 5 MG/1
5 TABLET ORAL DAILY
Refills: 0 | Status: ACTIVE | COMMUNITY

## 2024-05-10 NOTE — DISCUSSION/SUMMARY
[FreeTextEntry1] : 89-year-old M with PMH of HTN, HLD, AF, DM + neuropathy, mildly evolving gait imbalance that has worsened since right hip fracture followed by surgical repair.  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

## 2024-05-10 NOTE — REASON FOR VISIT
[Follow-Up: _____] : a [unfilled] follow-up visit [Spouse] : spouse [FreeTextEntry1] : Gait imbalance, dizziness

## 2024-05-10 NOTE — PHYSICAL EXAM
[General Appearance - Alert] : alert [General Appearance - In No Acute Distress] : in no acute distress [Oriented To Time, Place, And Person] : oriented to person, place, and time [Impaired Insight] : insight and judgment were intact [Affect] : the affect was normal [Person] : oriented to person [Place] : oriented to place [Time] : oriented to time [Registration Intact] : recent registration memory intact [Concentration Intact] : normal concentrating ability [Naming Objects] : no difficulty naming common objects [Repeating Phrases] : no difficulty repeating a phrase [Fluency] : fluency intact [Comprehension] : comprehension intact [Past History] : adequate knowledge of personal past history [Cranial Nerves Oculomotor (III)] : extraocular motion intact [Cranial Nerves Optic (II)] : visual acuity intact bilaterally,  visual fields full to confrontation, pupils equal round and reactive to light [Cranial Nerves Trigeminal (V)] : facial sensation intact symmetrically [Cranial Nerves Facial (VII)] : face symmetrical [Cranial Nerves Vestibulocochlear (VIII)] : hearing was intact bilaterally [Cranial Nerves Glossopharyngeal (IX)] : tongue and palate midline [Cranial Nerves Accessory (XI - Cranial And Spinal)] : head turning and shoulder shrug symmetric [Cranial Nerves Hypoglossal (XII)] : there was no tongue deviation with protrusion [Motor Tone] : muscle tone was normal in all four extremities [Motor Strength] : muscle strength was normal in all four extremities [No Muscle Atrophy] : normal bulk in all four extremities [Sensation Tactile Decrease] : light touch was intact [1+] : Patella left 1+ [0] : Ankle jerk left 0 [PERRL With Normal Accommodation] : pupils were equal in size, round, reactive to light, with normal accommodation [Extraocular Movements] : extraocular movements were intact [Full Visual Field] : full visual field [Outer Ear] : the ears and nose were normal in appearance [Oropharynx] : the oropharynx was normal [Neck Cervical Mass (___cm)] : no neck mass was observed [] : no respiratory distress [Auscultation Breath Sounds / Voice Sounds] : lungs were clear to auscultation bilaterally [Heart Sounds] : normal S1 and S2 [Romberg's Sign] : Romberg's sign was negtive [Past-pointing] : there was no past-pointing [Tremor] : no tremor present [Plantar Reflex Right Only] : normal on the right [Plantar Reflex Left Only] : normal on the left [___] : absent on the right [___] : absent on the left [FreeTextEntry7] : Distal vibration loss [FreeTextEntry8] : Gait/Balance: Mildly antalgic gait, favors left leg -uses a tripod cane [FreeTextEntry1] : Bilateral hearing loss

## 2024-05-10 NOTE — REVIEW OF SYSTEMS
[As Noted in HPI] : as noted in HPI [Poor Coordination] : poor coordination [Difficulty Walking] : difficulty walking [Loss Of Hearing] : hearing loss [Lower Ext Edema] : lower extremity edema [Joint Pain] : joint pain [Negative] : Heme/Lymph [Memory Lapses or Loss] : no memory loss [Leg Weakness] : no leg weakness [Dizziness] : dizziness [Vertigo] : vertigo [Frequent Falls] : not falling [Incontinence] : no incontinence [FreeTextEntry4] : [place] improved with new hearing aids [FreeTextEntry8] : Increased urinary frequency

## 2024-05-10 NOTE — HISTORY OF PRESENT ILLNESS
[FreeTextEntry1] : Patient is here for a follow-up visit today, last seen on 4/11/2023. Patient As per patient's wife, patient continues to have unsteady gait, he uses a cane for getting around, he has not had any falls, however he has had episodic dizziness, apparently he was hospitalized with severe dizziness in the summer 2023, diagnosed with positional vertigo, followed up with Dr. Mckeon ENT, has been sent for vestibular therapy which is apparently helping him tremendously, he goes every week, has also been having his ears checked every 2 weeks.  Patient reports that he had some issues with swallowing, is following up with swallow study with ENT.  Upon further questioning, no significant decline in patient's functioning behavior or memory noted over past 1 year.  Patient denies urinary incontinence, he reports he goes several times during the day, his wife does admit that he urinates every 3 hours but has not had any incontinence issues.

## 2024-05-10 NOTE — DATA REVIEWED
Hospitalist [de-identified] : 4/17/2023: MRI brain -no hydrocephalus, mass effect, acute intracranial hemorrhage, vasogenic edema or acute territorial infarct. MRI brain: No flow-limiting stenosis or vascular aneurysm 4/27/2018: MRI brain -Stable exam demonstrating communicating hydrocephalus and small arachnoid cyst with microvascular change [de-identified] : CT brain 7/10/2022: no CT evidence of acute intracranial pathology, moderate generalized cerebral volume loss and mild periventricular white matter hypoattenuation compatible with microvascular ischemic change

## 2024-05-15 ENCOUNTER — LABORATORY RESULT (OUTPATIENT)
Age: 89
End: 2024-05-15

## 2024-06-12 ENCOUNTER — LABORATORY RESULT (OUTPATIENT)
Age: 89
End: 2024-06-12

## 2024-06-13 ENCOUNTER — LABORATORY RESULT (OUTPATIENT)
Age: 89
End: 2024-06-13

## 2024-06-14 ENCOUNTER — LABORATORY RESULT (OUTPATIENT)
Age: 89
End: 2024-06-14

## 2024-06-20 ENCOUNTER — APPOINTMENT (OUTPATIENT)
Dept: CARDIOLOGY | Facility: CLINIC | Age: 89
End: 2024-06-20

## 2024-06-20 ENCOUNTER — NON-APPOINTMENT (OUTPATIENT)
Age: 89
End: 2024-06-20

## 2024-06-20 ENCOUNTER — APPOINTMENT (OUTPATIENT)
Dept: CARDIOLOGY | Facility: CLINIC | Age: 89
End: 2024-06-20
Payer: MEDICARE

## 2024-06-20 VITALS
OXYGEN SATURATION: 94 % | SYSTOLIC BLOOD PRESSURE: 116 MMHG | HEART RATE: 92 BPM | HEIGHT: 73 IN | DIASTOLIC BLOOD PRESSURE: 76 MMHG

## 2024-06-20 DIAGNOSIS — I35.0 NONRHEUMATIC AORTIC (VALVE) STENOSIS: ICD-10-CM

## 2024-06-20 DIAGNOSIS — I25.10 ATHEROSCLEROTIC HEART DISEASE OF NATIVE CORONARY ARTERY W/OUT ANGINA PECTORIS: ICD-10-CM

## 2024-06-20 DIAGNOSIS — Z86.73 PERSONAL HISTORY OF TRANSIENT ISCHEMIC ATTACK (TIA), AND CEREBRAL INFARCTION W/OUT RESIDUAL DEFICITS: ICD-10-CM

## 2024-06-20 DIAGNOSIS — I48.91 UNSPECIFIED ATRIAL FIBRILLATION: ICD-10-CM

## 2024-06-20 LAB
INR PPP: 1.5 RATIO
POCT-PROTHROMBIN TIME: 18.5 SECS
QUALITY CONTROL: YES

## 2024-06-20 PROCEDURE — 99214 OFFICE O/P EST MOD 30 MIN: CPT

## 2024-06-20 PROCEDURE — 93000 ELECTROCARDIOGRAM COMPLETE: CPT

## 2024-06-20 PROCEDURE — G2211 COMPLEX E/M VISIT ADD ON: CPT

## 2024-06-20 PROCEDURE — 85610 PROTHROMBIN TIME: CPT | Mod: QW

## 2024-06-20 RX ORDER — FAMOTIDINE 40 MG/1
40 TABLET, FILM COATED ORAL DAILY
Refills: 0 | Status: ACTIVE | COMMUNITY

## 2024-06-20 NOTE — PHYSICAL EXAM
[Normal S1, S2] : normal S1, S2 [Soft] : abdomen soft [Non Tender] : non-tender [No Edema] : no edema [Normal] : moves all extremities, no focal deficits, normal speech [Alert and Oriented] : alert and oriented [de-identified] : use of walker

## 2024-06-20 NOTE — DISCUSSION/SUMMARY
[FreeTextEntry1] : Here today routine follow up feeling well no complaints offered  Will have a follow up Echo in 3 months Labs with PCP   OV 3 months  Plan NIKKY patient/wife     [EKG obtained to assist in diagnosis and management of assessed problem(s)] : EKG obtained to assist in diagnosis and management of assessed problem(s)

## 2024-06-20 NOTE — HISTORY OF PRESENT ILLNESS
[FreeTextEntry1] :  88 y/o male PMH: chronic a-fib, CAD moderate LAD disease 2020, pre-DM, dyslipidemia, DVT on coumadin, cardiomyopathy, ADRY, COVID here today routine cardiac follow up. Repeat INR today  Cardiac testing/medications reviewed  Cardiac Cath 2/14/20; Moderate LAD disease 60% stenosis unchanged from 2016, NL LV FX Mild AS ( RCA 30% stenosis )  Echo; 5/16/23 EF 50-55%, Moderate AS, Moderate-severely dilated LA, RA dilated, Mild MR, will repeat in 9/24. Aortic Valve appears moderately calcified.  Nuclear stress test      [Persistent Atrial Fibrillation] : persistent atrial fibrillation

## 2024-06-27 LAB
INR PPP: 2.25
PT BLD: 25.4

## 2024-07-10 ENCOUNTER — LABORATORY RESULT (OUTPATIENT)
Age: 89
End: 2024-07-10

## 2024-07-11 ENCOUNTER — LABORATORY RESULT (OUTPATIENT)
Age: 89
End: 2024-07-11

## 2024-07-12 ENCOUNTER — LABORATORY RESULT (OUTPATIENT)
Age: 89
End: 2024-07-12

## 2024-07-16 ENCOUNTER — LABORATORY RESULT (OUTPATIENT)
Age: 89
End: 2024-07-16

## 2024-08-02 NOTE — PATIENT PROFILE ADULT - NSTOBACCOQUITATTEMPT_GEN_A_CORE_SD
Counseled grandmother present at consult about findings, EEG interpretation and seizures, investigative plan and management going forward  EEG ordered   Advised to start ASHLY   Continue OT, ST  Continue school   Will call gran with EEEG results   ED return discussed if neurology changes, focality or any concerns  
Pt has already quit smoking

## 2024-08-07 ENCOUNTER — LABORATORY RESULT (OUTPATIENT)
Age: 89
End: 2024-08-07

## 2024-09-05 ENCOUNTER — LABORATORY RESULT (OUTPATIENT)
Age: 89
End: 2024-09-05

## 2024-09-13 ENCOUNTER — LABORATORY RESULT (OUTPATIENT)
Age: 89
End: 2024-09-13

## 2024-09-19 ENCOUNTER — LABORATORY RESULT (OUTPATIENT)
Age: 89
End: 2024-09-19

## 2024-09-23 ENCOUNTER — APPOINTMENT (OUTPATIENT)
Dept: CARDIOLOGY | Facility: CLINIC | Age: 89
End: 2024-09-23
Payer: MEDICARE

## 2024-09-23 PROCEDURE — 93306 TTE W/DOPPLER COMPLETE: CPT

## 2024-09-26 ENCOUNTER — APPOINTMENT (OUTPATIENT)
Dept: CARDIOLOGY | Facility: CLINIC | Age: 89
End: 2024-09-26
Payer: MEDICARE

## 2024-09-26 ENCOUNTER — NON-APPOINTMENT (OUTPATIENT)
Age: 89
End: 2024-09-26

## 2024-09-26 VITALS
OXYGEN SATURATION: 97 % | HEIGHT: 73 IN | HEART RATE: 80 BPM | SYSTOLIC BLOOD PRESSURE: 110 MMHG | DIASTOLIC BLOOD PRESSURE: 66 MMHG

## 2024-09-26 DIAGNOSIS — I42.9 CARDIOMYOPATHY, UNSPECIFIED: ICD-10-CM

## 2024-09-26 DIAGNOSIS — I48.91 UNSPECIFIED ATRIAL FIBRILLATION: ICD-10-CM

## 2024-09-26 DIAGNOSIS — I35.0 NONRHEUMATIC AORTIC (VALVE) STENOSIS: ICD-10-CM

## 2024-09-26 LAB
INR PPP: 5.5
PT BLD: 65.6

## 2024-09-26 PROCEDURE — 99214 OFFICE O/P EST MOD 30 MIN: CPT

## 2024-09-26 PROCEDURE — 93000 ELECTROCARDIOGRAM COMPLETE: CPT

## 2024-09-26 PROCEDURE — G2211 COMPLEX E/M VISIT ADD ON: CPT

## 2024-09-26 RX ORDER — APIXABAN 5 MG/1
5 TABLET, FILM COATED ORAL
Qty: 180 | Refills: 3 | Status: ACTIVE | COMMUNITY
Start: 2024-09-26 | End: 1900-01-01

## 2024-09-26 NOTE — HISTORY OF PRESENT ILLNESS
[FreeTextEntry1] :  89y/o male PMH: chronic a-fib, CAD moderate LAD disease 2020, pre-DM, dyslipidemia, DVT on coumadin, cardiomyopathy, ADRY, COVID here today routine cardiac follow up. Repeat INR today  Cardiac testing/medications reviewed  Cardiac Cath 2/14/20; Moderate LAD disease 60% stenosis unchanged from 2016, NL LV FX Mild AS ( RCA 30% stenosis )  Echo; 5/16/23 EF 50-55%, Moderate AS, Moderate-severely dilated LA, RA dilated, Mild MR, will repeat in 9/24. Aortic Valve appears moderately calcified. Repeat Echo done this week was reviewed.        [Persistent Atrial Fibrillation] : persistent atrial fibrillation

## 2024-09-26 NOTE — DISCUSSION/SUMMARY
[FreeTextEntry1] : Here today routine follow up feeling well no complaints offered  F/U in 3 months  Plan DW patient/wife     [EKG obtained to assist in diagnosis and management of assessed problem(s)] : EKG obtained to assist in diagnosis and management of assessed problem(s)

## 2024-09-26 NOTE — PHYSICAL EXAM
[Normal S1, S2] : normal S1, S2 [Soft] : abdomen soft [Non Tender] : non-tender [No Edema] : no edema [Normal] : moves all extremities, no focal deficits, normal speech [Alert and Oriented] : alert and oriented [de-identified] : use of walker

## 2024-09-30 ENCOUNTER — LABORATORY RESULT (OUTPATIENT)
Age: 89
End: 2024-09-30

## 2024-10-03 ENCOUNTER — LABORATORY RESULT (OUTPATIENT)
Age: 89
End: 2024-10-03

## 2024-10-03 ENCOUNTER — APPOINTMENT (OUTPATIENT)
Dept: CARDIOLOGY | Facility: CLINIC | Age: 89
End: 2024-10-03
Payer: MEDICARE

## 2024-10-03 LAB
INR PPP: 4.5
PT BLD: 53.8

## 2024-10-03 PROCEDURE — 85610 PROTHROMBIN TIME: CPT | Mod: QW

## 2024-10-03 PROCEDURE — 93793 ANTICOAG MGMT PT WARFARIN: CPT

## 2024-10-07 ENCOUNTER — TRANSCRIPTION ENCOUNTER (OUTPATIENT)
Age: 89
End: 2024-10-07

## 2024-10-11 LAB
INR PPP: 1.8
PT BLD: 20.1

## 2024-10-31 ENCOUNTER — LABORATORY RESULT (OUTPATIENT)
Age: 89
End: 2024-10-31

## 2024-12-02 ENCOUNTER — TRANSCRIPTION ENCOUNTER (OUTPATIENT)
Age: 88
End: 2024-12-02

## 2025-02-04 ENCOUNTER — EMERGENCY (EMERGENCY)
Facility: HOSPITAL | Age: 89
LOS: 0 days | Discharge: HOME | End: 2025-02-04
Attending: EMERGENCY MEDICINE
Payer: MEDICARE

## 2025-02-04 VITALS
SYSTOLIC BLOOD PRESSURE: 180 MMHG | TEMPERATURE: 98 F | RESPIRATION RATE: 16 BRPM | HEART RATE: 70 BPM | DIASTOLIC BLOOD PRESSURE: 90 MMHG | OXYGEN SATURATION: 95 %

## 2025-02-04 VITALS
HEART RATE: 75 BPM | OXYGEN SATURATION: 95 % | TEMPERATURE: 98 F | SYSTOLIC BLOOD PRESSURE: 145 MMHG | RESPIRATION RATE: 16 BRPM | DIASTOLIC BLOOD PRESSURE: 76 MMHG

## 2025-02-04 DIAGNOSIS — Z86.718 PERSONAL HISTORY OF OTHER VENOUS THROMBOSIS AND EMBOLISM: ICD-10-CM

## 2025-02-04 DIAGNOSIS — Z79.01 LONG TERM (CURRENT) USE OF ANTICOAGULANTS: ICD-10-CM

## 2025-02-04 DIAGNOSIS — Z88.0 ALLERGY STATUS TO PENICILLIN: ICD-10-CM

## 2025-02-04 DIAGNOSIS — D68.51 ACTIVATED PROTEIN C RESISTANCE: ICD-10-CM

## 2025-02-04 DIAGNOSIS — Z90.49 ACQUIRED ABSENCE OF OTHER SPECIFIED PARTS OF DIGESTIVE TRACT: Chronic | ICD-10-CM

## 2025-02-04 DIAGNOSIS — I42.9 CARDIOMYOPATHY, UNSPECIFIED: ICD-10-CM

## 2025-02-04 DIAGNOSIS — Z88.8 ALLERGY STATUS TO OTHER DRUGS, MEDICAMENTS AND BIOLOGICAL SUBSTANCES: ICD-10-CM

## 2025-02-04 DIAGNOSIS — E78.5 HYPERLIPIDEMIA, UNSPECIFIED: ICD-10-CM

## 2025-02-04 DIAGNOSIS — S00.83XA CONTUSION OF OTHER PART OF HEAD, INITIAL ENCOUNTER: ICD-10-CM

## 2025-02-04 DIAGNOSIS — I48.91 UNSPECIFIED ATRIAL FIBRILLATION: ICD-10-CM
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
## 2025-02-04 PROCEDURE — 72125 CT NECK SPINE W/O DYE: CPT | Mod: 26

## 2025-02-04 PROCEDURE — 99284 EMERGENCY DEPT VISIT MOD MDM: CPT | Mod: 25

## 2025-02-04 PROCEDURE — 73502 X-RAY EXAM HIP UNI 2-3 VIEWS: CPT | Mod: 26,RT

## 2025-02-04 PROCEDURE — 99285 EMERGENCY DEPT VISIT HI MDM: CPT

## 2025-02-04 PROCEDURE — 74176 CT ABD & PELVIS W/O CONTRAST: CPT | Mod: 26

## 2025-02-04 PROCEDURE — 74176 CT ABD & PELVIS W/O CONTRAST: CPT

## 2025-02-04 PROCEDURE — 70450 CT HEAD/BRAIN W/O DYE: CPT

## 2025-02-04 PROCEDURE — 70450 CT HEAD/BRAIN W/O DYE: CPT | Mod: 26

## 2025-02-04 PROCEDURE — 72125 CT NECK SPINE W/O DYE: CPT

## 2025-02-04 PROCEDURE — 73502 X-RAY EXAM HIP UNI 2-3 VIEWS: CPT | Mod: RT

## 2025-02-04 PROCEDURE — 71250 CT THORAX DX C-: CPT | Mod: 26

## 2025-02-04 PROCEDURE — 71250 CT THORAX DX C-: CPT

## 2025-02-04 RX ORDER — CARVEDILOL 6.25 MG
12.5 TABLET ORAL ONCE
Refills: 0 | Status: COMPLETED | OUTPATIENT
Start: 2025-02-04 | End: 2025-02-04

## 2025-02-04 RX ADMIN — Medication 12.5 MILLIGRAM(S): at 20:47

## 2025-02-04 NOTE — ED ADULT NURSE REASSESSMENT NOTE - NS ED NURSE REASSESS COMMENT FT1
Report received from JORDON Baig. Pt resting comfortably in bed, denies any pain/ symptoms at this time, MD Barros made aware of BP, pt states he needs his night time BP medication. Awaiting DC.

## 2025-02-04 NOTE — ED PROVIDER NOTE - PHYSICAL EXAMINATION
Gen:  Well appearing in NAD  Head:  NC, contusion on the top of his head   HEENT: pupils perrl,no pharyngeal erythema, uvula midline  Cardiac: S1S2, RRR  Abd: Soft, non tender  Resp: No distress, CTA   musculoskeletal:: no deformities, no swelling, strength +5/+5  Skin: warm and dry as visualized, no rashes  Neuro: GRANT, aao x 4  Psych:alert, cooperative, appropriate mood and affect for situation

## 2025-02-04 NOTE — ED PROVIDER NOTE - OBJECTIVE STATEMENT
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 Pt tripped and fell. +head strike. No LOC. C/o R sided pain.

## 2025-02-04 NOTE — ED PROVIDER NOTE - NSFOLLOWUPINSTRUCTIONS_ED_ALL_ED_FT
Facial or Scalp Contusion  A facial or scalp contusion is a bruise (contusion) on the face or head. Bruises happen when an injury causes bleeding under the skin. The bruise may turn blue, purple, or yellow (discoloration). Minor injuries may cause a bruise that is not painful. Some bruises are painful and swollen for a few weeks.    Injuries to the face and head usually cause a lot of swelling, especially around the eyes. You may have other injuries as well, such as broken bones or cuts.    What are the causes?  An injury to the face or head from an object.  A fall.  A hit to the face or head area.  Car accidents.  Sports injuries.  Attacks from another person (assaults).  What are the signs or symptoms?  Swelling in the area of the injury. The swelling may be in a small areas and very noticeable.  The injured area being a different color than normal.  Pain or soreness in the injured area.  If you also have broken bones, your nose might be a different shape, you may be unable to close your mouth and you might have vision changes.    How is this treated?  Applying cold compresses to the hurt area. This is often the best treatment.  Taking over-the-counter medicines to help take the pain away, if your doctor tells you to take them.  If there are any cuts, these will need to be repaired as well.  Any deeper injuries may require treatment and follow up with a specialist, such as a surgeon or eye specialist.  Follow these instructions at home:  Managing pain, stiffness, and swelling    Bag of ice on a towel on the skin.   If told, put ice on the injured area. To do this:  Put ice in a plastic bag.  Place a towel between your skin and the bag.  Leave the ice on for 20 minutes, 2–3 times a day.  Take off the ice if your skin turns bright red. This is very important. If you cannot feel pain, heat, or cold, you have a greater risk of damage to the area.  Raise the injured area above the level of your heart while you are sitting or lying down.  General instructions    Take over-the-counter and prescription medicines only as told by your doctor.  Rest as told by your doctor.  Return to your normal activities when your doctor says that it is safe.  Do not blow your nose if you have any broken bones in your face.  Eat soft foods if you are having jaw pain.  Keep all follow-up visits.  Contact a doctor if:  You have trouble biting or chewing.  Your pain or swelling gets worse.  The bruised area gets worse.  Get help right away if:  You have very bad pain or a headache, and medicine does not help.  You are very tired or confused.  Your personality changes.  You vomit.  You have a nosebleed that does not stop.  You see two of everything (double vision) or have blurry vision.  You have clear fluid coming from your nose or ear, and it does not go away.  You have problems walking or using your arms or legs.  You feel very dizzy.  Summary  A facial or scalp contusion is a bruise on the face or head.  Bruises happen when an injury causes bleeding under the skin.  Minor injuries will cause a bruise that is not painful, but worse bruises can stay painful and swollen for a few weeks.  Go to a doctor if you have problems seeing, bleeding from your face or nose, or you have trouble biting or chewing.  Applying cold compresses to the hurt area is often the best treatment.  This information is not intended to replace advice given to you by your health care provider. Make sure you discuss any questions you have with your health care provider.    Document Revised: 01/24/2022 Document Reviewed: 01/24/2022  [employer] Patient Education © 2024 [employer]  [employer] logo  Terms and Conditions  Privacy Policy  Editorial Policy  All content on this site: Copyright © 2025 [employer], its licensors, and contributors. All rights are reserved, including those for text and data mining, AI training, and similar technologies. For all open access content, the Creative Commons licensing terms apply.  Cookies are used by this site.

## 2025-02-04 NOTE — ED ADULT NURSE NOTE - NSFALLHARMRISKINTERV_ED_ALL_ED
Assistance OOB with selected safe patient handling equipment if applicable/Assistance with ambulation/Communicate risk of Fall with Harm to all staff, patient, and family/Monitor gait and stability/Provide visual cue: red socks, yellow wristband, yellow gown, etc/Reinforce activity limits and safety measures with patient and family/Bed in lowest position, wheels locked, appropriate side rails in place/Call bell, personal items and telephone in reach/Instruct patient to call for assistance before getting out of bed/chair/stretcher/Non-slip footwear applied when patient is off stretcher/[place] to call system/Physically safe environment - no spills, clutter or unnecessary equipment/Purposeful Proactive Rounding/Room/bathroom lighting operational, light cord in reach

## 2025-02-04 NOTE — ED ADULT TRIAGE NOTE - CHIEF COMPLAINT QUOTE
Patient presents to the ER with complaints of trip and fall this am sustaining head strike, right arm skin tear, and right hip pain, unsteady gait since fall. Patient on eliquis and history of right hip fracture in 2019. Denies LOC, visual changes, N/V. Neuro Alert called at [phone]

## 2025-02-04 NOTE — ED ADULT NURSE NOTE - OBJECTIVE STATEMENT
Patient presents to the ER with complaints of trip and fall this am sustaining head strike, right arm skin tear, and right hip pain, unsteady gait since fall. Patient on eliquis and history of right hip fracture in 2019. Denies LOC, visual changes, N/V. Neuro Alert called at [phone]

## 2025-02-04 NOTE — ED PROVIDER NOTE - PATIENT PORTAL LINK FT
You can access the FollowMyHealth Patient Portal offered by [facility] by registering at the following website: [email or web address]. By joining [employer]’s FollowMyHealth portal, you will also be able to view your health information using other applications (apps) compatible with our system.

## 2025-02-04 NOTE — ED ADULT NURSE NOTE - CHIEF COMPLAINT QUOTE
Patient presents to the ER with complaints of trip and fall this am sustaining head strike, right arm skin tear, and right hip pain, unsteady gait since fall. Patient on eliquis and history of right hip fracture in 2019. Denies LOC, visual changes, N/V. Neuro Alert called at [phone]

## 2025-02-09 NOTE — REASON FOR VISIT
Mom called after hours with concern for Victoria fatigue today. She was diagnosed with the flu 1 week ago and has overall been improving with increased activity and appetite. Yesterday she was still continuing to have a lot of cough and congestion so mom go to [facility] where she had a chest x-ray that was negative for pneumonia and she was given an oral steroid. She took 1 dose at [facility] and mom was given an additional dose to give today. Mom is concerned because today Jaja has been saying that she does not feel well. She is not having any additional fevers and no difficulty breathing. She just overall is not feeling well. She has still been eating and drinking okay. Discussed with mom that this may be still related to the influenza virus and it has been taking a long time for kids to recover. Recommend monitoring her for any new fevers or difficulty breathing in which case she should be seen right away, however if in the morning she is continuing to be feeling poorly and not acting like herself, she can be seen in the clinic. [Follow-Up: _____] : a [unfilled] follow-up visit [Spouse] : spouse [FreeTextEntry1] : Follow up for pt with Gait instability and Dizziness

## 2025-03-20 ENCOUNTER — APPOINTMENT (OUTPATIENT)
Dept: VASCULAR SURGERY | Facility: CLINIC | Age: 89
End: 2025-03-20
Payer: MEDICARE

## 2025-03-20 VITALS
WEIGHT: 215 LBS | BODY MASS INDEX: 27.59 KG/M2 | SYSTOLIC BLOOD PRESSURE: 149 MMHG | HEIGHT: 74 IN | DIASTOLIC BLOOD PRESSURE: 75 MMHG | OXYGEN SATURATION: 96 % | HEART RATE: 84 BPM

## 2025-03-20 DIAGNOSIS — Z86.718 PERSONAL HISTORY OF OTHER VENOUS THROMBOSIS AND EMBOLISM: ICD-10-CM

## 2025-03-20 PROCEDURE — 99204 OFFICE O/P NEW MOD 45 MIN: CPT

## 2025-03-27 ENCOUNTER — NON-APPOINTMENT (OUTPATIENT)
Age: 89
End: 2025-03-27

## 2025-03-27 ENCOUNTER — APPOINTMENT (OUTPATIENT)
Dept: CARDIOLOGY | Facility: CLINIC | Age: 89
End: 2025-03-27
Payer: MEDICARE

## 2025-03-27 VITALS
OXYGEN SATURATION: 96 % | WEIGHT: 213 LBS | HEART RATE: 88 BPM | SYSTOLIC BLOOD PRESSURE: 140 MMHG | HEIGHT: 74 IN | BODY MASS INDEX: 27.34 KG/M2 | DIASTOLIC BLOOD PRESSURE: 84 MMHG

## 2025-03-27 DIAGNOSIS — I48.91 UNSPECIFIED ATRIAL FIBRILLATION: ICD-10-CM

## 2025-03-27 DIAGNOSIS — I42.9 CARDIOMYOPATHY, UNSPECIFIED: ICD-10-CM

## 2025-03-27 DIAGNOSIS — I25.10 ATHEROSCLEROTIC HEART DISEASE OF NATIVE CORONARY ARTERY W/OUT ANGINA PECTORIS: ICD-10-CM

## 2025-03-27 DIAGNOSIS — J20.9 ACUTE BRONCHITIS, UNSPECIFIED: ICD-10-CM

## 2025-03-27 DIAGNOSIS — I35.0 NONRHEUMATIC AORTIC (VALVE) STENOSIS: ICD-10-CM

## 2025-03-27 PROCEDURE — 93000 ELECTROCARDIOGRAM COMPLETE: CPT

## 2025-03-27 PROCEDURE — 99214 OFFICE O/P EST MOD 30 MIN: CPT

## 2025-03-27 PROCEDURE — G2211 COMPLEX E/M VISIT ADD ON: CPT

## 2025-04-07 ENCOUNTER — APPOINTMENT (OUTPATIENT)
Dept: NEUROLOGY | Facility: CLINIC | Age: 89
End: 2025-04-07
Payer: MEDICARE

## 2025-04-07 VITALS
BODY MASS INDEX: 27.59 KG/M2 | TEMPERATURE: 98.2 F | SYSTOLIC BLOOD PRESSURE: 168 MMHG | HEART RATE: 98 BPM | WEIGHT: 215 LBS | DIASTOLIC BLOOD PRESSURE: 96 MMHG | HEIGHT: 74 IN

## 2025-04-07 DIAGNOSIS — G91.2 (IDIOPATHIC) NORMAL PRESSURE HYDROCEPHALUS: ICD-10-CM

## 2025-04-07 DIAGNOSIS — R26.81 UNSTEADINESS ON FEET: ICD-10-CM

## 2025-04-07 PROCEDURE — 99214 OFFICE O/P EST MOD 30 MIN: CPT

## 2025-04-07 RX ORDER — BUDESONIDE 3 MG/1
CAPSULE, COATED PELLETS ORAL
Refills: 0 | Status: ACTIVE | COMMUNITY

## 2025-04-12 ENCOUNTER — APPOINTMENT (OUTPATIENT)
Dept: MRI IMAGING | Facility: CLINIC | Age: 89
End: 2025-04-12
Payer: MEDICARE

## 2025-04-12 ENCOUNTER — OUTPATIENT (OUTPATIENT)
Dept: OUTPATIENT SERVICES | Facility: HOSPITAL | Age: 89
LOS: 1 days | End: 2025-04-12
Payer: MEDICARE

## 2025-04-12 DIAGNOSIS — G91.2 (IDIOPATHIC) NORMAL PRESSURE HYDROCEPHALUS: ICD-10-CM

## 2025-04-12 DIAGNOSIS — Z90.49 ACQUIRED ABSENCE OF OTHER SPECIFIED PARTS OF DIGESTIVE TRACT: Chronic | ICD-10-CM

## 2025-04-12 PROCEDURE — 70551 MRI BRAIN STEM W/O DYE: CPT | Mod: 26

## 2025-04-12 PROCEDURE — 70551 MRI BRAIN STEM W/O DYE: CPT

## 2025-04-15 RX ORDER — LINAGLIPTIN 5 MG/1
TABLET, FILM COATED ORAL
Refills: 0 | Status: ACTIVE | COMMUNITY

## 2025-04-23 ENCOUNTER — APPOINTMENT (OUTPATIENT)
Dept: NEUROLOGY | Facility: CLINIC | Age: 89
End: 2025-04-23
Payer: MEDICARE

## 2025-04-23 VITALS
HEIGHT: 74 IN | DIASTOLIC BLOOD PRESSURE: 69 MMHG | SYSTOLIC BLOOD PRESSURE: 148 MMHG | WEIGHT: 215 LBS | BODY MASS INDEX: 27.59 KG/M2 | HEART RATE: 60 BPM

## 2025-04-23 DIAGNOSIS — G91.2 (IDIOPATHIC) NORMAL PRESSURE HYDROCEPHALUS: ICD-10-CM

## 2025-04-23 DIAGNOSIS — F06.70 MILD NEUROCOGNITIVE DISORDER DUE TO KNOWN PHYSIOLOGICAL CONDITION WITHOUT BEHAVIORAL DISTURBANCE: ICD-10-CM

## 2025-04-23 PROCEDURE — 99213 OFFICE O/P EST LOW 20 MIN: CPT | Mod: 25

## 2025-04-23 PROCEDURE — 96138 PSYCL/NRPSYC TECH 1ST: CPT | Mod: 59

## 2025-04-23 PROCEDURE — 96132 NRPSYC TST EVAL PHYS/QHP 1ST: CPT | Mod: 59

## 2025-04-23 RX ORDER — DONEPEZIL HYDROCHLORIDE 5 MG/1
5 TABLET ORAL DAILY
Qty: 30 | Refills: 2 | Status: ACTIVE | COMMUNITY
Start: 2025-04-23 | End: 1900-01-01

## 2025-04-25 ENCOUNTER — NON-APPOINTMENT (OUTPATIENT)
Age: 89
End: 2025-04-25

## 2025-04-28 ENCOUNTER — TRANSCRIPTION ENCOUNTER (OUTPATIENT)
Age: 89
End: 2025-04-28

## 2025-05-12 ENCOUNTER — APPOINTMENT (OUTPATIENT)
Dept: NEUROSURGERY | Facility: CLINIC | Age: 89
End: 2025-05-12
Payer: MEDICARE

## 2025-05-12 VITALS
HEART RATE: 65 BPM | SYSTOLIC BLOOD PRESSURE: 153 MMHG | WEIGHT: 213 LBS | OXYGEN SATURATION: 95 % | BODY MASS INDEX: 27.34 KG/M2 | DIASTOLIC BLOOD PRESSURE: 78 MMHG | HEIGHT: 74 IN

## 2025-05-12 DIAGNOSIS — G62.9 POLYNEUROPATHY, UNSPECIFIED: ICD-10-CM

## 2025-05-12 DIAGNOSIS — G91.2 (IDIOPATHIC) NORMAL PRESSURE HYDROCEPHALUS: ICD-10-CM

## 2025-05-12 DIAGNOSIS — F06.70 MILD NEUROCOGNITIVE DISORDER DUE TO KNOWN PHYSIOLOGICAL CONDITION WITHOUT BEHAVIORAL DISTURBANCE: ICD-10-CM

## 2025-05-12 DIAGNOSIS — R29.3 ABNORMAL POSTURE: ICD-10-CM

## 2025-05-12 PROCEDURE — 99203 OFFICE O/P NEW LOW 30 MIN: CPT

## 2025-05-15 LAB — DIGOXIN SERPL-MCNC: 0.8 NG/ML

## 2025-05-19 ENCOUNTER — APPOINTMENT (OUTPATIENT)
Dept: NEUROLOGY | Facility: CLINIC | Age: 89
End: 2025-05-19

## 2025-06-06 ENCOUNTER — TRANSCRIPTION ENCOUNTER (OUTPATIENT)
Age: 89
End: 2025-06-06

## 2025-06-17 ENCOUNTER — TRANSCRIPTION ENCOUNTER (OUTPATIENT)
Age: 89
End: 2025-06-17

## 2025-07-10 ENCOUNTER — APPOINTMENT (OUTPATIENT)
Dept: CARDIOLOGY | Facility: CLINIC | Age: 89
End: 2025-07-10
Payer: MEDICARE

## 2025-07-10 ENCOUNTER — OUTPATIENT (OUTPATIENT)
Dept: OUTPATIENT SERVICES | Facility: HOSPITAL | Age: 89
LOS: 1 days | End: 2025-07-10
Payer: MEDICARE

## 2025-07-10 VITALS
HEART RATE: 72 BPM | DIASTOLIC BLOOD PRESSURE: 77 MMHG | SYSTOLIC BLOOD PRESSURE: 138 MMHG | WEIGHT: 210.54 LBS | RESPIRATION RATE: 16 BRPM | HEIGHT: 73 IN | OXYGEN SATURATION: 97 % | TEMPERATURE: 99 F

## 2025-07-10 VITALS — DIASTOLIC BLOOD PRESSURE: 76 MMHG | SYSTOLIC BLOOD PRESSURE: 120 MMHG

## 2025-07-10 VITALS — DIASTOLIC BLOOD PRESSURE: 72 MMHG | HEART RATE: 90 BPM | OXYGEN SATURATION: 94 % | SYSTOLIC BLOOD PRESSURE: 140 MMHG

## 2025-07-10 DIAGNOSIS — G91.2 (IDIOPATHIC) NORMAL PRESSURE HYDROCEPHALUS: ICD-10-CM

## 2025-07-10 DIAGNOSIS — Z96.641 PRESENCE OF RIGHT ARTIFICIAL HIP JOINT: Chronic | ICD-10-CM

## 2025-07-10 DIAGNOSIS — Z90.49 ACQUIRED ABSENCE OF OTHER SPECIFIED PARTS OF DIGESTIVE TRACT: Chronic | ICD-10-CM

## 2025-07-10 LAB
ANION GAP SERPL CALC-SCNC: 5 MMOL/L — SIGNIFICANT CHANGE UP (ref 5–17)
APTT BLD: 34.7 SEC — SIGNIFICANT CHANGE UP (ref 26.1–36.8)
BASOPHILS # BLD AUTO: 0.03 K/UL — SIGNIFICANT CHANGE UP (ref 0–0.2)
BASOPHILS NFR BLD AUTO: 0.4 % — SIGNIFICANT CHANGE UP (ref 0–2)
BLD GP AB SCN SERPL QL: SIGNIFICANT CHANGE UP
BUN SERPL-MCNC: 28 MG/DL — HIGH (ref 7–23)
CALCIUM SERPL-MCNC: 9.8 MG/DL — SIGNIFICANT CHANGE UP (ref 8.5–10.1)
CHLORIDE SERPL-SCNC: 109 MMOL/L — HIGH (ref 96–108)
CO2 SERPL-SCNC: 25 MMOL/L — SIGNIFICANT CHANGE UP (ref 22–31)
CREAT SERPL-MCNC: 1.28 MG/DL — SIGNIFICANT CHANGE UP (ref 0.5–1.3)
EGFR: 53 ML/MIN/1.73M2 — LOW
EGFR: 53 ML/MIN/1.73M2 — LOW
EOSINOPHIL # BLD AUTO: 0.21 K/UL — SIGNIFICANT CHANGE UP (ref 0–0.5)
EOSINOPHIL NFR BLD AUTO: 2.8 % — SIGNIFICANT CHANGE UP (ref 0–6)
GLUCOSE SERPL-MCNC: 128 MG/DL — HIGH (ref 70–99)
HCT VFR BLD CALC: 40 % — SIGNIFICANT CHANGE UP (ref 39–50)
HGB BLD-MCNC: 12.9 G/DL — LOW (ref 13–17)
IMM GRANULOCYTES # BLD AUTO: 0.02 K/UL — SIGNIFICANT CHANGE UP (ref 0–0.07)
IMM GRANULOCYTES NFR BLD AUTO: 0.3 % — SIGNIFICANT CHANGE UP (ref 0–0.9)
INR BLD: 1.49 RATIO — HIGH (ref 0.85–1.16)
LYMPHOCYTES # BLD AUTO: 0.93 K/UL — LOW (ref 1–3.3)
LYMPHOCYTES NFR BLD AUTO: 12.4 % — LOW (ref 13–44)
MCHC RBC-ENTMCNC: 32.3 G/DL — SIGNIFICANT CHANGE UP (ref 32–36)
MCHC RBC-ENTMCNC: 32.3 PG — SIGNIFICANT CHANGE UP (ref 27–34)
MCV RBC AUTO: 100 FL — SIGNIFICANT CHANGE UP (ref 80–100)
MONOCYTES # BLD AUTO: 0.77 K/UL — SIGNIFICANT CHANGE UP (ref 0–0.9)
MONOCYTES NFR BLD AUTO: 10.2 % — SIGNIFICANT CHANGE UP (ref 2–14)
NEUTROPHILS # BLD AUTO: 5.56 K/UL — SIGNIFICANT CHANGE UP (ref 1.8–7.4)
NEUTROPHILS NFR BLD AUTO: 73.9 % — SIGNIFICANT CHANGE UP (ref 43–77)
NRBC # BLD AUTO: 0 K/UL — SIGNIFICANT CHANGE UP (ref 0–0)
NRBC # FLD: 0 K/UL — SIGNIFICANT CHANGE UP (ref 0–0)
NRBC BLD AUTO-RTO: 0 /100 WBCS — SIGNIFICANT CHANGE UP (ref 0–0)
PLATELET # BLD AUTO: 197 K/UL — SIGNIFICANT CHANGE UP (ref 150–400)
PMV BLD: 10.2 FL — SIGNIFICANT CHANGE UP (ref 7–13)
POTASSIUM SERPL-MCNC: 4.4 MMOL/L — SIGNIFICANT CHANGE UP (ref 3.5–5.3)
POTASSIUM SERPL-SCNC: 4.4 MMOL/L — SIGNIFICANT CHANGE UP (ref 3.5–5.3)
PROTHROM AB SERPL-ACNC: 17.5 SEC — HIGH (ref 9.9–13.4)
RBC # BLD: 4 M/UL — LOW (ref 4.2–5.8)
RBC # FLD: 12.6 % — SIGNIFICANT CHANGE UP (ref 10.3–14.5)
SODIUM SERPL-SCNC: 139 MMOL/L — SIGNIFICANT CHANGE UP (ref 135–145)
WBC # BLD: 7.52 K/UL — SIGNIFICANT CHANGE UP (ref 3.8–10.5)
WBC # FLD AUTO: 7.52 K/UL — SIGNIFICANT CHANGE UP (ref 3.8–10.5)

## 2025-07-10 PROCEDURE — 99214 OFFICE O/P EST MOD 30 MIN: CPT

## 2025-07-10 PROCEDURE — 86900 BLOOD TYPING SEROLOGIC ABO: CPT

## 2025-07-10 PROCEDURE — 93000 ELECTROCARDIOGRAM COMPLETE: CPT | Mod: NC

## 2025-07-10 PROCEDURE — 80048 BASIC METABOLIC PNL TOTAL CA: CPT

## 2025-07-10 PROCEDURE — 83036 HEMOGLOBIN GLYCOSYLATED A1C: CPT

## 2025-07-10 PROCEDURE — G2211 COMPLEX E/M VISIT ADD ON: CPT

## 2025-07-10 PROCEDURE — 85025 COMPLETE CBC W/AUTO DIFF WBC: CPT

## 2025-07-10 PROCEDURE — 86901 BLOOD TYPING SEROLOGIC RH(D): CPT

## 2025-07-10 PROCEDURE — 85730 THROMBOPLASTIN TIME PARTIAL: CPT

## 2025-07-10 PROCEDURE — 87641 MR-STAPH DNA AMP PROBE: CPT

## 2025-07-10 PROCEDURE — 99214 OFFICE O/P EST MOD 30 MIN: CPT | Mod: 25

## 2025-07-10 PROCEDURE — 86850 RBC ANTIBODY SCREEN: CPT

## 2025-07-10 PROCEDURE — 85610 PROTHROMBIN TIME: CPT

## 2025-07-10 PROCEDURE — 87640 STAPH A DNA AMP PROBE: CPT

## 2025-07-10 PROCEDURE — 36415 COLL VENOUS BLD VENIPUNCTURE: CPT

## 2025-07-10 RX ORDER — ENALAPRIL MALEATE 20 MG
1 TABLET ORAL
Refills: 0 | DISCHARGE

## 2025-07-10 RX ORDER — APIXABAN 5 MG/1
1 TABLET, FILM COATED ORAL
Refills: 0 | DISCHARGE

## 2025-07-10 RX ORDER — LINAGLIPTIN 5 MG/1
1 TABLET, FILM COATED ORAL
Refills: 0 | DISCHARGE

## 2025-07-10 NOTE — H&P PST ADULT - NSICDXFAMILYHX_GEN_ALL_CORE_FT
FAMILY HISTORY:  FH: breast cancer  FH: diabetes mellitus    Sibling  Still living? Unknown  Family history of ITP, Age at diagnosis: Age Unknown

## 2025-07-10 NOTE — H&P PST ADULT - NSICDXPASTMEDICALHX_GEN_ALL_CORE_FT
PAST MEDICAL HISTORY:  Afib     Dementia     DVT (deep venous thrombosis)     DVT, lower extremity     Factor V Leiden     H/O cardiomyopathy     H/O fracture of right hip     Hearing loss     History of diverticulitis     History of hepatitis C     HTN (hypertension)     Hyperlipemia     Normal pressure hydrocephalus     ADRY on CPAP     Ruptured appendix      PAST MEDICAL HISTORY:  Afib     Aortic stenosis     CAD (coronary artery disease)     Cognitive impairment     DVT, lower extremity     Factor V Leiden     H/O cardiomyopathy     H/O fracture of right hip     Hearing loss     History of diverticulitis     History of hepatitis C     HTN (hypertension)     Hyperlipemia     Normal pressure hydrocephalus     ADRY on CPAP     Ruptured appendix

## 2025-07-10 NOTE — H&P PST ADULT - ASSESSMENT
89 year old PMH significant of  cardiomyopathy, AFib on Eliquis Aortic stenosis, CAD moderate  LAD disease  HTN, HLD, ADRY on CPAP, cognitive impairment, T2DM, DVT, Factor V Leiden; c/o gait instability worsening over time; imaging done; diagnosed with normal pressure hydrocephalus; he presents to PST for planned insertion of lumbar drain       Plan:  1. PST instructions given ; NPO status/  instructions to be given by ASU   2. Pt instructed to take following meds on day of surgery: digoxin, carvedilol and nebulizer tx   3. Pt instructed to take routine evening medications unless indicated   4. Stop NSAIDS ( Aspirin Alev Motrin Mobic Diclofenac), herbal supplements , MVI , Vitamin fish oil 7 days prior to surgery  unless   directed by surgeon or cardiologist;   5. Cardiac  Optimization  with Dr Loja   6. EZ wash instructions given & mupirocin instructions given  7. CBC BMP PTT, PT/INR T&S EKG ( cardio) HgA1C   done     CAPRINI SCORE Version 2013    AGE RELATED RISK FACTORS                                                             [ ] Age 41-60 years             [1 point]  [ ] Age: 61-74 years            [2 points]                 [x ] Age 75 years or over     [3 points]             DISEASE RELATED RISK FACTORS                                                       [ ] Current swollen legs (pitting edema of any level)     [1 point]                     [ ] Varicose veins (visible, bulging vein, not spider veins or surgically removed veins)   [1 point]                                 [x ] BMI > 25 Kg/m2                       [1 point]  [ ] BMI > 40 kg/m2                       [1 point]                                 [ ] Serious infection (within past month, requiring hospitalization and IV antibiotics)    [1 point]                     [ ] Lung disease ( interstitial: COPD, emphysema, sarcoid, 9-11 illness, NOT asthma)       [1 point]                                                                          [ ] Acute myocardial infarction (within past month)      [1 point]                  [ ] Congestive heart failure (episode within past month or taking CHF meds)                   [1 point]         [ ] Inflammatory bowel disease (Crohns disease or ulcerative colitis, not irritable bowel syndrome)   [1 point]                  [ ] Central venous access, PICC line or Port (within past month)     [2 points]                                                             [ ] Stroke (within past month)     [5 points]    [ ] Previous or present malignancy (includes melanoma but not basal cell carcinoma, each incidence of cancer scores 2 points-not metastases)  [2 points]                                                                                                                                                         HEMATOLOGY RELATED FACTORS                                                         [x ] History of DVT or PE (including superficial venous thrombosis)    [3 points]                    [ ] Positive family history for DVT or PE (includes first-, second-, and third-degree relatives)   [3 points]   [ ] Personal or family history of genetic thrombophilia (family history counts only if it has not been confirmed that patient does not have this genetic marker)                       [ ] Prothrombin [id number] mutation                   [3 points]                           [x ] Factor V Leiden                                               [3 points]            [ ] Antithrombin III deficiency                           [3 points]         [ ] Protein C & S deficiency                                [3 points]              [ ] Dysfibrinogenemia                                         [3 points]  [ ] Personal history of acquired thrombophilia                       [ ] Lupus anticoagulant                                       [3 points]                                                                  [ ] Anticardiolipin antibodies                             [3 points]              [ ] Antiphospholipid antibodies                         [3 points]                                                         [ ] High homocysteine in the blood                  [3 points]                                                    [ ] Myeloproliferative disorders (including thrombocytosis)    [3 points]            [ ] HIV                                                                     [3 points]                                                    [ ] Heparin induced thrombocytopenia            [3 points]                                        MOBILITY RELATED FACTORS  [ ] Bed rest or restricted mobility (inability to ambulate 30 feet continuously or removable leg brace) for less than 72 hours    [1 point]  [ ] Nonremovable plaster cast or mold that prevents calf muscle use   [2 points]  [ ] Bed bound  or restricted mobility for more than 72 hours                  [2 points]    GENDER SPECIFIC FACTORS  [ ] Pregnancy or had a baby within the last month   [1 point]  [ ] Hormone therapy  or oral contraception               [1 point]  [ ] Current use of estrogen-like drugs (raloxifine, tamoxifen, anastrozole, letrozole)                                [1 point]  [ ] History of unexplained stillborn infant, premature birth with toxemia or growth-restricted infant   [1 point]  [ ] Recurrent spontaneous abortions (3 or more)      [1 point]    OTHER RISK FACTORS                                         [ ] Current smoker (includes vaping and smoking marijuana)      [1 point]  [ ] Diabetes requiring insulin     [1 point]                   [ ] Chemotherapy (includes methotrexate for rheumatoid arthritis, hydroxyurea for thrombocytosis)    [1 point]  [ ] Blood transfusion(s)               [1 point]    SURGERY RELATED RISK FACTORS  [ ]  section within the last month                    [1 point]  [ ] Minor surgery is planned (less than 45 minutes)     [1 point]  [ x] Past major surgery (longer than 45 minutes) within past month  [2 points]  [ ] Planned major surgery lasting more than 45 minutes (includes laparoscopic and arthroscopic; do not add to the "5" for hip and knee replacement)    [2 points]  [ ] Length of surgery over 2 hours (includes anesthesia time; do not add to the "5" for hip and knee replacement)   [1 point]  [ ] Elective hip or knee joint replacement surgery         [5 points]                                               TRAUMA RELATED RISK FACTORS  [ ] Fracture of the hip, pelvis, or leg                       [5 points]  [ ] Spinal cord injury resulting in paralysis (within the past month)    [5 points]  [ ] Paralysis  (within the past month)                      [5 points]  [ ] Multiple trauma (within the past month)         [5 Points]    Total Score [   12     ]    Total hip and total knee replacement:  Caprini score 9 or less: LOW risk  Caprini score 10 or highter: HIGH RISK    Caprini score 0-2: Low Risk, NO VTE prophylaxis required for most patients, encourage ambulation  Caprini score 3-6: Moderate Risk , pharmacologic VTE prophylaxis is indicated for most patients (in the absence of contraindications)  Caprini score 7 or higher: High risk, pharmocologic VTE prophylaxis indicated for most patients (in the absence of contraindications)

## 2025-07-10 NOTE — H&P PST ADULT - NSICDXPASTSURGICALHX_GEN_ALL_CORE_FT
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

## 2025-07-10 NOTE — H&P PST ADULT - LAST STRESS TEST
Surgical Progress Note    Author: Hailey Teixeira Date & Time created: 2018   8:31 AM     Interval Events:  POD#5 Diagnostic laparoscopy, creation of diverting colostomy and mucous fistula. Admits to nausea, denies vomiting. Ambulating, voiding. Admits to some gas in bag. Admits to feeling bloated and distended. Pt is alert and oriented, NAD. Breathing unlabored. Tolerating PO  liquids.  Abdomen is less firm today, distended, tender at incision sites and at ostomy site. Incisions C/D/I. Ostomy red, edematous, some gas and stool in appliance. CT ordered yesterday, shows large amount of stool, cannot rule out obstruction, pleural effusion and consolidation vs atelectasis.  VS reviewed, Tachycardic in 100s-110s. Labs reviewed, WBC normal, Hb ok. Encouraged ambulation and incentive spirometry. Back to clear liquids. D'c Dilaudid PCA, start orals. Await return of bowel function. Will discuss with Dr. Rosen. [facility] covering for emergencies.  Review of Systems   Constitutional: Negative for chills and fever.   Respiratory: Negative for cough and shortness of breath.    Gastrointestinal: Positive for abdominal pain and nausea. Negative for diarrhea, heartburn and vomiting.   Skin: Negative for itching and rash.     Hemodynamics:  Temp (24hrs), Av.8 °C (98.3 °F), Min:36.6 °C (97.9 °F), Max:37.1 °C (98.8 °F)  Temperature: 36.6 °C (97.9 °F)  Pulse  Av  Min: 72  Max: 122   Blood Pressure: 125/84     Respiratory:    Respiration: 18, Pulse Oximetry: 94 %     Work Of Breathing / Effort: Mild  RUL Breath Sounds: Clear, RML Breath Sounds: Clear, RLL Breath Sounds: Diminished, MELISSA Breath Sounds: Clear, LLL Breath Sounds: Diminished  Neuro:  GCS       Fluids:    Intake/Output Summary (Last 24 hours) at 18 0831  Last data filed at 18 0800   Gross per 24 hour   Intake             1180 ml   Output              500 ml   Net              680 ml        Current Diet Order   Procedures   • DIET ORDER      Physical Exam   Constitutional: She is oriented to person, place, and time. She appears well-developed and well-nourished. No distress.   Cardiovascular:   tachycardia   Pulmonary/Chest: Effort normal. No respiratory distress.   Abdominal: She exhibits distension. She exhibits no mass. There is tenderness. There is no rebound and no guarding.   Neurological: She is alert and oriented to person, place, and time.   Skin: Skin is warm and dry. No rash noted. She is not diaphoretic. No erythema.   Psychiatric: She has a normal mood and affect. Her behavior is normal.   Vitals reviewed.    Labs:  Recent Results (from the past 24 hour(s))   CBC WITH DIFFERENTIAL    Collection Time: 01/17/18  1:02 AM   Result Value Ref Range    WBC 9.1 4.8 - 10.8 K/uL    RBC 3.16 (L) 4.20 - 5.40 M/uL    Hemoglobin 9.2 (L) 12.0 - 16.0 g/dL    Hematocrit 28.7 (L) 37.0 - 47.0 %    MCV 90.8 81.4 - 97.8 fL    MCH 29.1 27.0 - 33.0 pg    MCHC 32.1 (L) 33.6 - 35.0 g/dL    RDW 43.9 35.9 - 50.0 fL    Platelet Count 415 164 - 446 K/uL    MPV 10.6 9.0 - 12.9 fL    Neutrophils-Polys 79.60 (H) 44.00 - 72.00 %    Lymphocytes 9.00 (L) 22.00 - 41.00 %    Monocytes 9.50 0.00 - 13.40 %    Eosinophils 0.70 0.00 - 6.90 %    Basophils 0.20 0.00 - 1.80 %    Immature Granulocytes 1.00 (H) 0.00 - 0.90 %    Nucleated RBC 0.00 /100 WBC    Neutrophils (Absolute) 7.22 (H) 2.00 - 7.15 K/uL    Lymphs (Absolute) 0.82 (L) 1.00 - 4.80 K/uL    Monos (Absolute) 0.86 (H) 0.00 - 0.85 K/uL    Eos (Absolute) 0.06 0.00 - 0.51 K/uL    Baso (Absolute) 0.02 0.00 - 0.12 K/uL    Immature Granulocytes (abs) 0.09 0.00 - 0.11 K/uL    NRBC (Absolute) 0.00 K/uL   COMP METABOLIC PANEL    Collection Time: 01/17/18  1:02 AM   Result Value Ref Range    Sodium 135 135 - 145 mmol/L    Potassium 3.6 3.6 - 5.5 mmol/L    Chloride 103 96 - 112 mmol/L    Co2 20 20 - 33 mmol/L    Anion Gap 12.0 (H) 0.0 - 11.9    Glucose 91 65 - 99 mg/dL    Bun 4 (L) 8 - 22 mg/dL    Creatinine 0.39 (L) 0.50 - 1.40  mg/dL    Calcium 8.0 (L) 8.5 - 10.5 mg/dL    AST(SGOT) 11 (L) 12 - 45 U/L    ALT(SGPT) 7 2 - 50 U/L    Alkaline Phosphatase 45 30 - 99 U/L    Total Bilirubin 0.4 0.1 - 1.5 mg/dL    Albumin 2.8 (L) 3.2 - 4.9 g/dL    Total Protein 5.7 (L) 6.0 - 8.2 g/dL    Globulin 2.9 1.9 - 3.5 g/dL    A-G Ratio 1.0 g/dL   ESTIMATED GFR    Collection Time: 01/17/18  1:02 AM   Result Value Ref Range    GFR If African American >60 >60 mL/min/1.73 m 2    GFR If Non African American >60 >60 mL/min/1.73 m 2     Medical Decision Making, by Problem:  There are no active hospital problems to display for this patient.    Plan:  POD#5 Diagnostic laparoscopy, creation of diverting colostomy and mucous fistula. Admits to nausea, denies vomiting. Ambulating, voiding. Admits to some gas in bag. Admits to feeling bloated and distended. Pt is alert and oriented, NAD. Breathing unlabored. Tolerating PO  liquids.  Abdomen is less firm today, distended, tender at incision sites and at ostomy site. Incisions C/D/I. Ostomy red, edematous, some gas and stool in appliance. CT ordered yesterday, shows large amount of stool, cannot rule out obstruction, pleural effusion and consolidation vs atelectasis.  VS reviewed, Tachycardic in 100s-110s. Labs reviewed, WBC normal, Hb ok. Encouraged ambulation and incentive spirometry. Back to clear liquids. D'c Dilaudid PCA, start orals. Await return of bowel function. Will discuss with Dr. Rosen. [facility] covering for emergencies.    Quality Measures:    Reviewed items::  Labs reviewed  Junior catheter::  No Junior  DVT prophylaxis pharmacological::  Enoxaparin (Lovenox)  DVT prophylaxis - mechanical:  SCDs  Ulcer Prophylaxis::  Yes      Discussed patient condition with Family, Patient and Dr. Rosen.      Attending addendum: Patient seen and examined this morning. Ostomy still edematous with minimal output. Abdomen distended. Taking minimal clears. CT from 1/16 reviewed. White count okay but some acidosis today.  Would continue fluid resuscitation and bowel regimen. Watch abdominal exam closely. Continue daily labs.   unsure

## 2025-07-11 DIAGNOSIS — G91.2 (IDIOPATHIC) NORMAL PRESSURE HYDROCEPHALUS: ICD-10-CM

## 2025-07-11 PROBLEM — I82.409 ACUTE EMBOLISM AND THROMBOSIS OF UNSPECIFIED DEEP VEINS OF UNSPECIFIED LOWER EXTREMITY: Chronic | Status: INACTIVE | Noted: 2020-02-13 | Resolved: 2025-07-10

## 2025-07-11 LAB
A1C WITH ESTIMATED AVERAGE GLUCOSE RESULT: 8 % — HIGH (ref 4–5.6)
ESTIMATED AVERAGE GLUCOSE: 183 MG/DL — HIGH (ref 68–114)
MRSA PCR RESULT.: SIGNIFICANT CHANGE UP
S AUREUS DNA NOSE QL NAA+PROBE: SIGNIFICANT CHANGE UP

## 2025-07-17 ENCOUNTER — APPOINTMENT (OUTPATIENT)
Dept: NEUROSURGERY | Facility: HOSPITAL | Age: 89
End: 2025-07-17

## 2025-09-05 ENCOUNTER — OUTPATIENT (OUTPATIENT)
Dept: OUTPATIENT SERVICES | Facility: HOSPITAL | Age: 89
LOS: 1 days | End: 2025-09-05
Payer: MEDICARE

## 2025-09-05 ENCOUNTER — TRANSCRIPTION ENCOUNTER (OUTPATIENT)
Age: 89
End: 2025-09-05

## 2025-09-05 VITALS
RESPIRATION RATE: 18 BRPM | HEART RATE: 64 BPM | WEIGHT: 213.85 LBS | SYSTOLIC BLOOD PRESSURE: 146 MMHG | DIASTOLIC BLOOD PRESSURE: 84 MMHG | HEIGHT: 74 IN | OXYGEN SATURATION: 97 % | TEMPERATURE: 97 F

## 2025-09-05 DIAGNOSIS — Z90.49 ACQUIRED ABSENCE OF OTHER SPECIFIED PARTS OF DIGESTIVE TRACT: Chronic | ICD-10-CM

## 2025-09-05 DIAGNOSIS — Z96.641 PRESENCE OF RIGHT ARTIFICIAL HIP JOINT: Chronic | ICD-10-CM

## 2025-09-05 DIAGNOSIS — Z01.818 ENCOUNTER FOR OTHER PREPROCEDURAL EXAMINATION: ICD-10-CM

## 2025-09-05 PROBLEM — Z86.19 PERSONAL HISTORY OF OTHER INFECTIOUS AND PARASITIC DISEASES: Chronic | Status: ACTIVE | Noted: 2025-07-10

## 2025-09-05 PROBLEM — H91.90 UNSPECIFIED HEARING LOSS, UNSPECIFIED EAR: Chronic | Status: ACTIVE | Noted: 2025-07-10

## 2025-09-05 PROBLEM — I10 ESSENTIAL (PRIMARY) HYPERTENSION: Chronic | Status: ACTIVE | Noted: 2025-07-10

## 2025-09-05 PROBLEM — I35.0 NONRHEUMATIC AORTIC (VALVE) STENOSIS: Chronic | Status: ACTIVE | Noted: 2025-07-10

## 2025-09-05 PROBLEM — Z87.81 PERSONAL HISTORY OF (HEALED) TRAUMATIC FRACTURE: Chronic | Status: ACTIVE | Noted: 2025-07-10

## 2025-09-05 PROBLEM — I82.409 ACUTE EMBOLISM AND THROMBOSIS OF UNSPECIFIED DEEP VEINS OF UNSPECIFIED LOWER EXTREMITY: Chronic | Status: ACTIVE | Noted: 2025-07-10

## 2025-09-05 PROBLEM — R41.89 OTHER SYMPTOMS AND SIGNS INVOLVING COGNITIVE FUNCTIONS AND AWARENESS: Chronic | Status: ACTIVE | Noted: 2025-07-10

## 2025-09-05 PROBLEM — G91.2 (IDIOPATHIC) NORMAL PRESSURE HYDROCEPHALUS: Chronic | Status: ACTIVE | Noted: 2025-07-10

## 2025-09-05 PROBLEM — I25.10 ATHEROSCLEROTIC HEART DISEASE OF NATIVE CORONARY ARTERY WITHOUT ANGINA PECTORIS: Chronic | Status: ACTIVE | Noted: 2025-07-10

## 2025-09-05 PROBLEM — Z87.19 PERSONAL HISTORY OF OTHER DISEASES OF THE DIGESTIVE SYSTEM: Chronic | Status: ACTIVE | Noted: 2025-07-10

## 2025-09-05 PROBLEM — G47.33 OBSTRUCTIVE SLEEP APNEA (ADULT) (PEDIATRIC): Chronic | Status: ACTIVE | Noted: 2025-07-10

## 2025-09-05 LAB
APPEARANCE UR: CLEAR — SIGNIFICANT CHANGE UP
APTT BLD: 35.7 SEC — SIGNIFICANT CHANGE UP (ref 26.1–36.8)
BACTERIA # UR AUTO: ABNORMAL /HPF
BASOPHILS # BLD AUTO: 0.02 K/UL — SIGNIFICANT CHANGE UP (ref 0–0.2)
BASOPHILS NFR BLD AUTO: 0.3 % — SIGNIFICANT CHANGE UP (ref 0–2)
BILIRUB UR-MCNC: NEGATIVE — SIGNIFICANT CHANGE UP
BLD GP AB SCN SERPL QL: SIGNIFICANT CHANGE UP
CAST: 0 /LPF — SIGNIFICANT CHANGE UP (ref 0–4)
COLOR SPEC: SIGNIFICANT CHANGE UP
DIFF PNL FLD: NEGATIVE — SIGNIFICANT CHANGE UP
EOSINOPHIL # BLD AUTO: 0.13 K/UL — SIGNIFICANT CHANGE UP (ref 0–0.5)
EOSINOPHIL NFR BLD AUTO: 1.9 % — SIGNIFICANT CHANGE UP (ref 0–6)
GLUCOSE UR QL: NEGATIVE MG/DL — SIGNIFICANT CHANGE UP
HCT VFR BLD CALC: 42.3 % — SIGNIFICANT CHANGE UP (ref 39–50)
HGB BLD-MCNC: 13.4 G/DL — SIGNIFICANT CHANGE UP (ref 13–17)
IMM GRANULOCYTES # BLD AUTO: 0.01 K/UL — SIGNIFICANT CHANGE UP (ref 0–0.07)
IMM GRANULOCYTES NFR BLD AUTO: 0.1 % — SIGNIFICANT CHANGE UP (ref 0–0.9)
INR BLD: 1.31 RATIO — HIGH (ref 0.85–1.16)
KETONES UR QL: NEGATIVE MG/DL — SIGNIFICANT CHANGE UP
LEUKOCYTE ESTERASE UR-ACNC: ABNORMAL
LG PLATELETS BLD QL AUTO: SLIGHT — SIGNIFICANT CHANGE UP
LYMPHOCYTES # BLD AUTO: 1.21 K/UL — SIGNIFICANT CHANGE UP (ref 1–3.3)
LYMPHOCYTES NFR BLD AUTO: 17.9 % — SIGNIFICANT CHANGE UP (ref 13–44)
MACROCYTES BLD QL: SLIGHT — SIGNIFICANT CHANGE UP
MANUAL SMEAR VERIFICATION: SIGNIFICANT CHANGE UP
MCHC RBC-ENTMCNC: 31.7 G/DL — LOW (ref 32–36)
MCHC RBC-ENTMCNC: 32 PG — SIGNIFICANT CHANGE UP (ref 27–34)
MCV RBC AUTO: 101 FL — HIGH (ref 80–100)
MONOCYTES # BLD AUTO: 0.61 K/UL — SIGNIFICANT CHANGE UP (ref 0–0.9)
MONOCYTES NFR BLD AUTO: 9 % — SIGNIFICANT CHANGE UP (ref 2–14)
NEUTROPHILS # BLD AUTO: 4.77 K/UL — SIGNIFICANT CHANGE UP (ref 1.8–7.4)
NEUTROPHILS NFR BLD AUTO: 70.8 % — SIGNIFICANT CHANGE UP (ref 43–77)
NITRITE UR-MCNC: NEGATIVE — SIGNIFICANT CHANGE UP
NRBC # BLD AUTO: 0 K/UL — SIGNIFICANT CHANGE UP (ref 0–0)
NRBC # FLD: 0 K/UL — SIGNIFICANT CHANGE UP (ref 0–0)
NRBC BLD AUTO-RTO: 0 /100 WBCS — SIGNIFICANT CHANGE UP (ref 0–0)
PH UR: 5 — SIGNIFICANT CHANGE UP (ref 5–8)
PLAT MORPH BLD: ABNORMAL
PLATELET # BLD AUTO: 114 K/UL — LOW (ref 150–400)
PMV BLD: 11.2 FL — SIGNIFICANT CHANGE UP (ref 7–13)
PROT UR-MCNC: 100 MG/DL
PROTHROM AB SERPL-ACNC: 15.1 SEC — HIGH (ref 9.9–13.4)
RBC # BLD: 4.19 M/UL — LOW (ref 4.2–5.8)
RBC # FLD: 14.2 % — SIGNIFICANT CHANGE UP (ref 10.3–14.5)
RBC BLD AUTO: ABNORMAL
RBC CASTS # UR COMP ASSIST: 0 /HPF — SIGNIFICANT CHANGE UP (ref 0–4)
SP GR SPEC: 1.02 — SIGNIFICANT CHANGE UP (ref 1–1.03)
SQUAMOUS # UR AUTO: 1 /HPF — SIGNIFICANT CHANGE UP (ref 0–5)
UROBILINOGEN FLD QL: 1 MG/DL — SIGNIFICANT CHANGE UP (ref 0.2–1)
WBC # BLD: 6.75 K/UL — SIGNIFICANT CHANGE UP (ref 3.8–10.5)
WBC # FLD AUTO: 6.75 K/UL — SIGNIFICANT CHANGE UP (ref 3.8–10.5)
WBC UR QL: 6 /HPF — HIGH (ref 0–5)

## 2025-09-05 PROCEDURE — 99214 OFFICE O/P EST MOD 30 MIN: CPT | Mod: 25

## 2025-09-05 PROCEDURE — 86901 BLOOD TYPING SEROLOGIC RH(D): CPT

## 2025-09-05 PROCEDURE — 86900 BLOOD TYPING SEROLOGIC ABO: CPT

## 2025-09-05 PROCEDURE — 85025 COMPLETE CBC W/AUTO DIFF WBC: CPT

## 2025-09-05 PROCEDURE — 85730 THROMBOPLASTIN TIME PARTIAL: CPT

## 2025-09-05 PROCEDURE — 93005 ELECTROCARDIOGRAM TRACING: CPT

## 2025-09-05 PROCEDURE — 93010 ELECTROCARDIOGRAM REPORT: CPT

## 2025-09-05 PROCEDURE — 36415 COLL VENOUS BLD VENIPUNCTURE: CPT

## 2025-09-05 PROCEDURE — 87641 MR-STAPH DNA AMP PROBE: CPT

## 2025-09-05 PROCEDURE — 87086 URINE CULTURE/COLONY COUNT: CPT

## 2025-09-05 PROCEDURE — 86850 RBC ANTIBODY SCREEN: CPT

## 2025-09-05 PROCEDURE — 85610 PROTHROMBIN TIME: CPT

## 2025-09-05 PROCEDURE — 81001 URINALYSIS AUTO W/SCOPE: CPT

## 2025-09-05 PROCEDURE — 87640 STAPH A DNA AMP PROBE: CPT

## 2025-09-06 DIAGNOSIS — Z01.818 ENCOUNTER FOR OTHER PREPROCEDURAL EXAMINATION: ICD-10-CM

## 2025-09-06 DIAGNOSIS — G91.2 (IDIOPATHIC) NORMAL PRESSURE HYDROCEPHALUS: ICD-10-CM

## 2025-09-06 LAB
CULTURE RESULTS: SIGNIFICANT CHANGE UP
MRSA PCR RESULT.: SIGNIFICANT CHANGE UP
S AUREUS DNA NOSE QL NAA+PROBE: SIGNIFICANT CHANGE UP
SPECIMEN SOURCE: SIGNIFICANT CHANGE UP

## 2025-09-11 ENCOUNTER — APPOINTMENT (OUTPATIENT)
Dept: NEUROSURGERY | Facility: CLINIC | Age: 89
End: 2025-09-11

## 2025-09-11 ENCOUNTER — APPOINTMENT (OUTPATIENT)
Dept: NEUROSURGERY | Facility: HOSPITAL | Age: 89
End: 2025-09-11

## 2025-09-11 RX ORDER — METFORMIN HYDROCHLORIDE 850 MG/1
1 TABLET ORAL
Refills: 0 | DISCHARGE

## 2025-09-13 ENCOUNTER — TRANSCRIPTION ENCOUNTER (OUTPATIENT)
Age: 89
End: 2025-09-13

## 2025-09-13 RX ORDER — APIXABAN 2.5 MG/1
1 TABLET, FILM COATED ORAL
Refills: 0 | DISCHARGE

## 2025-09-15 ENCOUNTER — TRANSCRIPTION ENCOUNTER (OUTPATIENT)
Age: 89
End: 2025-09-15